# Patient Record
Sex: FEMALE | Race: WHITE
[De-identification: names, ages, dates, MRNs, and addresses within clinical notes are randomized per-mention and may not be internally consistent; named-entity substitution may affect disease eponyms.]

---

## 2017-02-08 ENCOUNTER — HOSPITAL ENCOUNTER (OUTPATIENT)
Dept: HOSPITAL 47 - BARWHC3 | Age: 47
Discharge: HOME | End: 2017-02-08
Payer: COMMERCIAL

## 2017-02-08 VITALS — BODY MASS INDEX: 35.7 KG/M2

## 2017-02-08 VITALS — HEART RATE: 74 BPM | SYSTOLIC BLOOD PRESSURE: 146 MMHG | DIASTOLIC BLOOD PRESSURE: 77 MMHG | TEMPERATURE: 99 F

## 2017-02-08 DIAGNOSIS — E66.01: ICD-10-CM

## 2017-02-08 DIAGNOSIS — Z79.899: ICD-10-CM

## 2017-02-08 DIAGNOSIS — Z98.84: ICD-10-CM

## 2017-02-08 DIAGNOSIS — R13.10: ICD-10-CM

## 2017-02-08 DIAGNOSIS — Z48.815: Primary | ICD-10-CM

## 2017-02-08 DIAGNOSIS — K50.90: ICD-10-CM

## 2017-02-08 DIAGNOSIS — Z98.890: ICD-10-CM

## 2017-02-08 DIAGNOSIS — E03.9: ICD-10-CM

## 2017-02-08 DIAGNOSIS — E44.0: ICD-10-CM

## 2017-02-08 DIAGNOSIS — Z71.3: ICD-10-CM

## 2017-02-08 DIAGNOSIS — N19: ICD-10-CM

## 2017-02-08 DIAGNOSIS — E89.1: ICD-10-CM

## 2017-02-08 DIAGNOSIS — T75.89XA: ICD-10-CM

## 2017-02-08 DIAGNOSIS — R79.89: ICD-10-CM

## 2017-02-08 DIAGNOSIS — E55.9: ICD-10-CM

## 2017-02-08 DIAGNOSIS — K74.1: ICD-10-CM

## 2017-02-08 DIAGNOSIS — L40.50: ICD-10-CM

## 2017-02-08 DIAGNOSIS — Z88.0: ICD-10-CM

## 2017-02-08 DIAGNOSIS — D50.8: ICD-10-CM

## 2017-02-08 DIAGNOSIS — M19.90: ICD-10-CM

## 2017-02-08 DIAGNOSIS — E21.1: ICD-10-CM

## 2017-02-08 DIAGNOSIS — K76.0: ICD-10-CM
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
## 2017-02-08 DIAGNOSIS — F32.9: ICD-10-CM

## 2017-02-08 PROCEDURE — 99211 OFF/OP EST MAY X REQ PHY/QHP: CPT

## 2017-02-08 PROCEDURE — 97803 MED NUTRITION INDIV SUBSEQ: CPT

## 2017-02-10 NOTE — PN
DATE OF SERVICE: 02/08/2017



CHIEF COMPLAINT: Followup, gastric bypass. 



HISTORY OF PRESENT ILLNESS: Jess Khoury is a 46-year-old female who 

is status post Garrett-en-Y gastric bypass on 10/31/2016. She is now over 

3 months out. Her highest weight for her 5-foot 4-inch frame was 289 

pounds. Ideal body weight is 144 pounds. Today she comes in weighing 

208 pounds. Lifetime weight loss is already 81 pounds. Her weight loss 

since her last visit is another 22 pounds. Body mass 

index has been reduced from 49.7 to 35.8. Percent excess 

weight loss is 56%. Total BMI is already down by 13.9 

points. She has a new diagnosis of psoriatic arthritis, where she had 

moderate joint involvement including erythema of the skin. She has 

been on a month-long steroid taper and has amazingly not gained any 

moderate or significant weight. Her rheumatologist is Dr. Brooks. She 

does report some troubles with swallowing, particularly with moderate 

textured foods such as steak. She is overall very happy with her level 

of weight loss. She still has some troubles with

her new image. Separately she also reports pain from her previous 

pelvic reconstruction at [facility], where she requires 

additional surgery. She reports eating expected amount of protein of 

over 65 grams daily.  



PAST MEDICAL HISTORY: 

1. Morbid obesity. 

2. Hypothyroidism. 

3. Anxiety.

4. Depression. 

5. Diabetes type 2. 

6. Panniculitis. 

7. Intra-abdominal adhesions

8. Chronic diarrhea. 

9. Gastroesophageal reflux disease. 

10. Pelvic adhesions. 



PAST SURGICAL HISTORY:  

1. Partial thyroidectomy. 

2. Hysterectomy. 

3. Bladder suspension. 

4. Upper endoscopy. 

5. Colonoscopy. 

6. Pelvic reconstruction at [facility] in January 2016.  

7. Cardiac catheterization one week ago. 

8. Status post Garrett-en-Y gastric bypass 10/31/2016. 



MEDICATIONS: 

1. Prednisone. 

2. Wellbutrin. 

3. Prilosec. 

4. Nystatin powder. 

5. Singulair. 

6. Synthroid. 

7. Norco. 

8. Prozac. 



ALLERGIES: Initially PENICILLIN. 



SOCIAL HISTORY: Lifelong nontobacco user. 



FAMILY HISTORY: Pertinent for fatty liver disease including gallbladder

disorders, also has morbid obesity in her family. 



REVIEW OF ORGAN SYSTEMS: 

CONSTITUTIONAL: Highest weight of 289 pounds. Ideal body weight 144 

pounds for her 5-foot, 4-inch frame. Present weight of 208 pounds. 

Total weight loss of 81 pounds. Percent excess weight loss of 56%. 

Body mass index reduced from 49.7 to 35.7.  

ENDOCRINE: History of hypothyroidism. 

MUSCULOSKELETAL: Improvement of diffuse osteoarthritis of the joints, 

particularly in knees, hips and hands, after being treated for 

psoriatic arthritis with a prednisone taper.  

PSYCH: Still persistent history of depression. 

CARDIOVASCULAR: Improvement of hypertension, where she is now off 

medications related to her hypertension.  

GASTROINTESTINAL: Denies any gastroesophageal reflux disease, as it 

has now resolved. Only concerns include intermittent dysphagia, 

particularly with textured foods.  

RESPIRATORY: History of obstructive sleep apnea. 

ENDOCRINE: History of hypothyroidism including diabetes type 2. 

GENITOURINARY: History of pelvic reconstruction. Also history of 

bladder prolapse.  

HEENT: Denies troubles with vision or hearing. 

NEURO: Has headache and seizure disorders. 

HEMATOLOGIC: Denies any easy bruising. 



PHYSICAL EXAM: 

VITAL SIGNS: 99.0, 74, 16, 146/77, 5 feet 4 inches, 208 pounds. Body 

mass index 35.8.  

ABDOMEN: No palpable incisional hernias. All incisions completely 

granulated. Soft, nontender, nondistended. 

GENERAL: Well-developed female in no acute distress. 

NEURO: No focal or lateralizing signs. Cranial nerves II through XII 

grossly within normal limits.  

PSYCH: Appropriate affect. Alert and oriented to person, place, and 

time.  

HEENT: No scleral icterus. 

NECK: Supple without lymphadenopathy. 

CHEST: Nonlabored respirations. 

CARDIOVASCULAR: Regular rate. 

MUSCULOSKELETAL: No clubbing, cyanosis, or edema. 



ASSESSMENT: 

1. Morbid obesity due to excess calories. 

2. Body mass index reduced from 49.7 to 35.8. 

3. Status post Garrett-en-Y gastric bypass. 

4. Dietary surveillance and counseling. 

5. Psoriatic arthritis. 

6. Dysphagia to solid foods. 

7. Active steroid exposure. 

8. Osteoarthritis, improved.

9. Hypothyroidism. 

10. Depression. 

11. Elevated liver enzymes. 

12. Fatty liver disease. 

13. Vitamin D deficiency. 

14. History of a bladder suspension. 

15. Diabetes type 2, non-insulin-dependent, now resolved. 

16. Obstructive sleep apnea, now resolved.  

17. Gastroesophageal reflux disease, now resolved. 

18. Hypertensive heart disease, now resolved. 



PLAN: 

1. She has been doing quite well as of recent after a fairly 

gracy postoperative course with abdominal pain from skin infection, 

which has completely resolved.  

2. She will be closely monitored, as she is on steroid therapy, and 

would recommend continued Prilosec use.  

3. She is to undergo additional surgical intervention for the pelvis, 

whereby she is cleared from a bariatric standpoint to proceed.  

4. I have recommended a bariatric metabolic panel to identify any 

nutritional deficiencies which will interfere with her wound healing.  

5. With her dysphagia to solid textured foods, she may also benefit 

from upper endoscopy with balloon dilatation.  

6. Recommend followup at [facility].

PUNEET

## 2017-02-12 NOTE — P.PN
Progress Note - Text











DATE OF SERVICE: 02/08/2017





CHIEF COMPLAINT: Followup, gastric bypass. 





HISTORY OF PRESENT ILLNESS: Jess Khoury is a 46-year-old female who 


is status post Garrett-en-Y gastric bypass on 10/31/2016. She is now over 


3 months out. Her highest weight for her 5-foot 4-inch frame was 289 


pounds. Ideal body weight is 144 pounds. Today she comes in weighing 


208 pounds. Lifetime weight loss is already 81 pounds. Her weight loss 


since her last visit is another 22 pounds. Body mass 


index has been reduced from 49.7 to 35.8. Percent excess 


weight loss is 56%. Total BMI is already down by 13.9 


points. She has a new diagnosis of psoriatic arthritis, where she had 


moderate joint involvement including erythema of the skin. She has 


been on a month-long steroid taper and has amazingly not gained any 


moderate or significant weight. Her rheumatologist is Dr. Brooks. She 


does report some troubles with swallowing, particularly with moderate 


textured foods such as steak. She is overall very happy with her level 


of weight loss. She still has some troubles with


her new image. Separately she also reports pain from her previous 


pelvic reconstruction at [facility], where she requires 


additional surgery. She reports eating expected amount of protein of 


over 65 grams daily.  





PAST MEDICAL HISTORY: 


1. Morbid obesity. 


2. Hypothyroidism. 


3. Anxiety.


4. Depression. 


5. Diabetes type 2. 


6. Panniculitis. 


7. Intra-abdominal adhesions


8. Chronic diarrhea. 


9. Gastroesophageal reflux disease. 


10. Pelvic adhesions. 





PAST SURGICAL HISTORY:  


1. Partial thyroidectomy. 


2. Hysterectomy. 


3. Bladder suspension. 


4. Upper endoscopy. 


5. Colonoscopy. 


6. Pelvic reconstruction at [facility] in January 2016.  


7. Cardiac catheterization one week ago. 


8. Status post Garrett-en-Y gastric bypass 10/31/2016. 





MEDICATIONS: 


1. Prednisone. 


2. Wellbutrin. 


3. Prilosec. 


4. Nystatin powder. 


5. Singulair. 


6. Synthroid. 


7. Norco. 


8. Prozac. 





ALLERGIES: Initially PENICILLIN. 





SOCIAL HISTORY: Lifelong nontobacco user. 





FAMILY HISTORY: Pertinent for fatty liver disease including gallbladder


disorders, also has morbid obesity in her family. 





REVIEW OF ORGAN SYSTEMS: 


CONSTITUTIONAL: Highest weight of 289 pounds. Ideal body weight 144 


pounds for her 5-foot, 4-inch frame. Present weight of 208 pounds. 


Total weight loss of 81 pounds. Percent excess weight loss of 56%. 


Body mass index reduced from 49.7 to 35.7.  


ENDOCRINE: History of hypothyroidism. 


MUSCULOSKELETAL: Improvement of diffuse osteoarthritis of the joints, 


particularly in knees, hips and hands, after being treated for 


psoriatic arthritis with a prednisone taper.  


PSYCH: Still persistent history of depression. 


CARDIOVASCULAR: Improvement of hypertension, where she is now off 


medications related to her hypertension.  


GASTROINTESTINAL: Denies any gastroesophageal reflux disease, as it 


has now resolved. Only concerns include intermittent dysphagia, 


particularly with textured foods.  


RESPIRATORY: History of obstructive sleep apnea. 


ENDOCRINE: History of hypothyroidism including diabetes type 2. 


GENITOURINARY: History of pelvic reconstruction. Also history of 


bladder prolapse.  


HEENT: Denies troubles with vision or hearing. 


NEURO: Has headache and seizure disorders. 


HEMATOLOGIC: Denies any easy bruising. 





PHYSICAL EXAM: 


VITAL SIGNS: 99.0, 74, 16, 146/77, 5 feet 4 inches, 208 pounds. Body 


mass index 35.8.  


ABDOMEN: No palpable incisional hernias. All incisions completely 


granulated. Soft, nontender, nondistended. 


GENERAL: Well-developed female in no acute distress. 


NEURO: No focal or lateralizing signs. Cranial nerves II through XII 


grossly within normal limits.  


PSYCH: Appropriate affect. Alert and oriented to person, place, and 


time.  


HEENT: No scleral icterus. 


NECK: Supple without lymphadenopathy. 


CHEST: Nonlabored respirations. 


CARDIOVASCULAR: Regular rate. 


MUSCULOSKELETAL: No clubbing, cyanosis, or edema. 





ASSESSMENT: 


1. Morbid obesity due to excess calories. 


2. Body mass index reduced from 49.7 to 35.8. 


3. Status post Garrett-en-Y gastric bypass. 


4. Dietary surveillance and counseling. 


5. Psoriatic arthritis. 


6. Dysphagia to solid foods. 


7. Active steroid exposure. 


8. Osteoarthritis, improved.


9. Hypothyroidism. 


10. Depression. 


11. Elevated liver enzymes. 


12. Fatty liver disease. 


13. Vitamin D deficiency. 


14. History of a bladder suspension. 


15. Diabetes type 2, non-insulin-dependent, now resolved. 


16. Obstructive sleep apnea, now resolved.  


17. Gastroesophageal reflux disease, now resolved. 


18. Hypertensive heart disease, now resolved. 





PLAN: 


1. She has been doing quite well as of recent after a fairly 


gracy postoperative course with abdominal pain from skin infection, 


which has completely resolved.  


2. She will be closely monitored, as she is on steroid therapy, and 


would recommend continued Prilosec use.  


3. She is to undergo additional surgical intervention for the pelvis, 


whereby she is cleared from a bariatric standpoint to proceed.  


4. I have recommended a bariatric metabolic panel to identify any 


nutritional deficiencies which will interfere with her wound healing.  


5. With her dysphagia to solid textured foods, she may also benefit 


from upper endoscopy with balloon dilatation.  


6. Recommend followup at [facility].

## 2017-04-12 ENCOUNTER — HOSPITAL ENCOUNTER (OUTPATIENT)
Dept: HOSPITAL 47 - BARWHC3 | Age: 47
Discharge: HOME | End: 2017-04-12
Payer: COMMERCIAL

## 2017-04-12 VITALS — BODY MASS INDEX: 33.7 KG/M2

## 2017-04-12 VITALS — DIASTOLIC BLOOD PRESSURE: 95 MMHG | TEMPERATURE: 98.5 F | SYSTOLIC BLOOD PRESSURE: 162 MMHG | HEART RATE: 88 BPM

## 2017-04-12 DIAGNOSIS — M06.9: ICD-10-CM

## 2017-04-12 DIAGNOSIS — I11.9: ICD-10-CM

## 2017-04-12 DIAGNOSIS — M79.3: ICD-10-CM

## 2017-04-12 DIAGNOSIS — Z48.815: Primary | ICD-10-CM

## 2017-04-12 DIAGNOSIS — M17.0: ICD-10-CM

## 2017-04-12 DIAGNOSIS — Z98.84: ICD-10-CM

## 2017-04-12 DIAGNOSIS — M47.896: ICD-10-CM

## 2017-04-12 DIAGNOSIS — Z71.3: ICD-10-CM

## 2017-04-12 DIAGNOSIS — E66.9: ICD-10-CM

## 2017-04-12 DIAGNOSIS — M16.0: ICD-10-CM

## 2017-04-12 DIAGNOSIS — Z79.899: ICD-10-CM

## 2017-04-12 PROCEDURE — 97803 MED NUTRITION INDIV SUBSEQ: CPT

## 2017-04-12 PROCEDURE — 99211 OFF/OP EST MAY X REQ PHY/QHP: CPT

## 2017-04-23 NOTE — PN
DATE OF SERVICE:  04/12/2017. 



CHIEF COMPLAINT: Follow-up gastric bypass.



HISTORY OF PRESENT ILLNESS:   Jess Khoury is a 46-year-old female 

who is status post Garrett-en-Y gastric bypass on 10/31/2016. She is now 

6 months out. For her height of 5 foot 4 inches, her ideal body weight 

is 144 pounds. Today she comes in weighing 196 pounds. Her highest 

weight was 289 pounds. She has already lost 93 pounds. Body mass index 

was reduced from 49.7 down to 33.8. BMI point reduction is 16 points. 

She has already achieved 64% weight loss in 6 months. Since her last 

visit approximately a little over  4+ months ago she has lost 

another 12 pounds. She comes in primarily with complaints of decreased 

appetite. She is now under 200 pounds. She was diagnosed with 

rheumatoid arthritis, which she sees a rheumatologist. Now she 

presents for further evaluation and management. She also reports 

moderate personal stressors in her life which is also affecting her 

blood pressure. She is off CPAP machine as well.  



PAST MEDICAL HISTORY: 

1. Morbid obesity. 

2. Hypothyroidism. 

3. Anxiety.

4. Depression. 

5. Diabetes type 2. 

6. Panniculitis. 

7. Intra-abdominal adhesions

8. Chronic diarrhea. 

9. Gastroesophageal reflux disease. 

10. Rheumatoid Arthritis.



PAST SURGICAL HISTORY:  

1. Partial thyroidectomy. 

2. Hysterectomy. 

3. Bladder suspension. 

4. Upper endoscopy. 

5. Colonoscopy. 

6. Pelvic reconstruction at [facility] in January 2016.  

7. Cardiac catheterization one week ago. 

8. Status post Garrett-en-Y gastric bypass. 



MEDICATIONS:

1. Hyzaar. 

2. Prednisone. 

3. Wellbutrin. 

4. Prilosec. 

5. Nystatin powder. 

6. Singulair. 

7. Synthroid. 

8. Prozac. 



ALLERGIES: Resolved PENICILLIN. 



SOCIAL HISTORY: Lifelong nontobacco user. 



FAMILY HISTORY: Pertinent for fatty liver disease including gallbladder

disorders, also has morbid obesity in her family. 



REVIEW OF SYSTEMS:

CONSTITUTIONAL: Highest weight is 289 pounds. Ideal body weight of 144 

pounds. She has lost 93 pound in the last 6 months. Percent excess 

weight loss is 64%. Body mass index is reduced from 49.7 down to 33.8. 

Total BMI point reduction is 16 points. She is only 52 pounds 

overweight.  

RESPIRATORY:  She is to discontinue her CPAP machine.  Sleep apnea has 

now resolved.  

CARDIOVASCULAR: She is on blood pressure medication secondary to minor 

stress in her life.  

MUSCULOSKELETAL: Moderate reduction of bilateral hip and knee and 

lower back pain. Rheumatologic: Diagnosed with rheumatoid arthritis. 

Currently on steroids as a result.  

GASTROINTESTINAL: No reports of gastroesophageal reflux which is 

resolved. Denies dumping syndrome otherwise.  

ENDOCRINE: History of hypothyroidism including diabetes type 2 now resoled.

GENITOURINARY: History of pelvic reconstruction. Also history of 

bladder prolapse.  

HEENT: Denies troubles with vision or hearing. 

NEURO: Has headache and seizure disorders. 

PSYCH: Has anxiety including depression. 

HEMATOLOGIC: Denies any easy bruising. 



PHYSICAL EXAM:

VITAL SIGNS: 98.5, 88, 162/95, 5 foot 4, 196 pounds. Body mass index 

of 33.8.  

ABDOMEN: Soft, nontender, nondistended. No palpable incisional 

hernias. Pannus extends over pubis by at least 5 cm.  Mild hyperemia 

consistent with panniculitis.  

GENERAL: Well-developed female in no acute distress. 

NEURO: No focal or lateralizing signs. Cranial nerves II through XII 

grossly within normal limits.  

PSYCH: Appropriate affect. Alert and oriented to person, place, and 

time.  

HEENT: No scleral icterus. Extraocular movements grossly intact. No nasal 
drainage.

NECK: Supple without lymphadenopathy. 

CHEST: Nonlabored respirations. 

CARDIOVASCULAR: Regular rate. Regular rhythm.

MUSCULOSKELETAL: No clubbing, cyanosis, or edema. 



ASSESSMENT:

1. Morbid obesity due to excess calories, now resolved. 

2. Body mass index reduced from 49.7 down to 33.8. 

3. Osteoarthritis. 

4. Hypothyroidism. 

5. Depression. 

6. Elevated liver enzymes, improved.

7. Fatty liver disease. 

8. Vitamin D deficiency. 

9. Obesity with body mass index is 33.8. 

10. History of intra-abdominal adhesions. 

11. Left ovarian cyst. 

12. History of a bladder suspension. 

13. Diabetes type 2, non-insulin-dependent. 

14. Obstructive sleep apnea. 

15. Gastroesophageal reflux disease, resolved.

17. History of atypical chest pain. 

18. Hypertensive heart disease. 

19. Osteoarthritis, improved.. 

20. Status post Garrett-en-Y gastric bypass. 

21. Social stressors aggravating hypertension. 

22. Osteoarthritis lower back, improved. 

23. Osteoarthritis of bilateral hips, improved. 

24. Osteoarthritis bilateral knees, improved. 

25. Panniculitis.



PLAN:

1. Recommend bariatric metabolic panel as she is long overdue. She 

reports having her blood work done at her local facility. However, 

blood work is still pending at this time.  

2. She is also pending additional procedures for pelvic surgery which 

she has been cleared.  

3. On exam, she has panniculitis for which nystatin powder is 

advised.   

PUNEET

## 2017-05-09 NOTE — P.PN
Progress Note - Text











DATE OF SERVICE:  04/12/2017. 





CHIEF COMPLAINT: Follow-up gastric bypass.





HISTORY OF PRESENT ILLNESS:   Jess Khoury is a 46-year-old female 


who is status post Garrett-en-Y gastric bypass on 10/31/2016. She is now 


6 months out. For her height of 5 foot 4 inches, her ideal body weight 


is 144 pounds. Today she comes in weighing 196 pounds. Her highest 


weight was 289 pounds. She has already lost 93 pounds. Body mass index 


was reduced from 49.7 down to 33.8. BMI point reduction is 16 points. 


She has already achieved 64% weight loss in 6 months. Since her last 


visit approximately a little over  4+ months ago she has lost 


another 12 pounds. She comes in primarily with complaints of decreased 


appetite. She is now under 200 pounds. She was diagnosed with 


rheumatoid arthritis, which she sees a rheumatologist. Now she 


presents for further evaluation and management. She also reports 


moderate personal stressors in her life which is also affecting her 


blood pressure. She is off CPAP machine as well.  





PAST MEDICAL HISTORY: 


1. Morbid obesity. 


2. Hypothyroidism. 


3. Anxiety.


4. Depression. 


5. Diabetes type 2. 


6. Panniculitis. 


7. Intra-abdominal adhesions


8. Chronic diarrhea. 


9. Gastroesophageal reflux disease. 


10. Rheumatoid Arthritis.





PAST SURGICAL HISTORY:  


1. Partial thyroidectomy. 


2. Hysterectomy. 


3. Bladder suspension. 


4. Upper endoscopy. 


5. Colonoscopy. 


6. Pelvic reconstruction at [facility] in January 2016.  


7. Cardiac catheterization one week ago. 


8. Status post Garrett-en-Y gastric bypass. 





MEDICATIONS:


1. Hyzaar. 


2. Prednisone. 


3. Wellbutrin. 


4. Prilosec. 


5. Nystatin powder. 


6. Singulair. 


7. Synthroid. 


8. Prozac. 





ALLERGIES: Resolved PENICILLIN. 





SOCIAL HISTORY: Lifelong nontobacco user. 





FAMILY HISTORY: Pertinent for fatty liver disease including gallbladder


disorders, also has morbid obesity in her family. 





REVIEW OF SYSTEMS:


CONSTITUTIONAL: Highest weight is 289 pounds. Ideal body weight of 144 


pounds. She has lost 93 pound in the last 6 months. Percent excess 


weight loss is 64%. Body mass index is reduced from 49.7 down to 33.8. 


Total BMI point reduction is 16 points. She is only 52 pounds 


overweight.  


RESPIRATORY:  She is to discontinue her CPAP machine.  Sleep apnea has 


now resolved.  


CARDIOVASCULAR: She is on blood pressure medication secondary to minor 


stress in her life.  


MUSCULOSKELETAL: Moderate reduction of bilateral hip and knee and 


lower back pain. Rheumatologic: Diagnosed with rheumatoid arthritis. 


Currently on steroids as a result.  


GASTROINTESTINAL: No reports of gastroesophageal reflux which is 


resolved. Denies dumping syndrome otherwise.  


ENDOCRINE: History of hypothyroidism including diabetes type 2 now resoled.


GENITOURINARY: History of pelvic reconstruction. Also history of 


bladder prolapse.  


HEENT: Denies troubles with vision or hearing. 


NEURO: Has headache and seizure disorders. 


PSYCH: Has anxiety including depression. 


HEMATOLOGIC: Denies any easy bruising. 





PHYSICAL EXAM:


VITAL SIGNS: 98.5, 88, 162/95, 5 foot 4, 196 pounds. Body mass index 


of 33.8.  


ABDOMEN: Soft, nontender, nondistended. No palpable incisional 


hernias. Pannus extends over pubis by at least 5 cm.  Mild hyperemia 


consistent with panniculitis.  


GENERAL: Well-developed female in no acute distress. 


NEURO: No focal or lateralizing signs. Cranial nerves II through XII 


grossly within normal limits.  


PSYCH: Appropriate affect. Alert and oriented to person, place, and 


time.  


HEENT: No scleral icterus. Extraocular movements grossly intact. No nasal 

drainage.


NECK: Supple without lymphadenopathy. 


CHEST: Nonlabored respirations. 


CARDIOVASCULAR: Regular rate. Regular rhythm.


MUSCULOSKELETAL: No clubbing, cyanosis, or edema. 





ASSESSMENT:


1. Morbid obesity due to excess calories, now resolved. 


2. Body mass index reduced from 49.7 down to 33.8. 


3. Osteoarthritis. 


4. Hypothyroidism. 


5. Depression. 


6. Elevated liver enzymes, improved.


7. Fatty liver disease. 


8. Vitamin D deficiency. 


9. Obesity with body mass index is 33.8. 


10. History of intra-abdominal adhesions. 


11. Left ovarian cyst. 


12. History of a bladder suspension. 


13. Diabetes type 2, non-insulin-dependent. 


14. Obstructive sleep apnea. 


15. Gastroesophageal reflux disease, resolved.


17. History of atypical chest pain. 


18. Hypertensive heart disease. 


19. Osteoarthritis, improved.. 


20. Status post Garrett-en-Y gastric bypass. 


21. Social stressors aggravating hypertension. 


22. Osteoarthritis lower back, improved. 


23. Osteoarthritis of bilateral hips, improved. 


24. Osteoarthritis bilateral knees, improved. 


25. Panniculitis.





PLAN:


1. Recommend bariatric metabolic panel as she is long overdue. She 


reports having her blood work done at her local facility. However, 


blood work is still pending at this time.  


2. She is also pending additional procedures for pelvic surgery which 


she has been cleared.  


3. On exam, she has panniculitis for which nystatin powder is 


advised.

## 2017-08-02 ENCOUNTER — HOSPITAL ENCOUNTER (OUTPATIENT)
Dept: HOSPITAL 47 - BARWHC3 | Age: 47
Discharge: HOME | End: 2017-08-02
Payer: COMMERCIAL

## 2017-08-02 DIAGNOSIS — Z53.9: Primary | ICD-10-CM

## 2017-08-23 ENCOUNTER — HOSPITAL ENCOUNTER (OUTPATIENT)
Dept: HOSPITAL 47 - BARWHC3 | Age: 47
Discharge: HOME | End: 2017-08-23
Payer: COMMERCIAL

## 2017-08-23 VITALS — TEMPERATURE: 98 F | DIASTOLIC BLOOD PRESSURE: 69 MMHG | SYSTOLIC BLOOD PRESSURE: 143 MMHG | HEART RATE: 71 BPM

## 2017-08-23 VITALS — BODY MASS INDEX: 31.9 KG/M2

## 2017-08-23 DIAGNOSIS — F41.9: ICD-10-CM

## 2017-08-23 DIAGNOSIS — Z87.19: ICD-10-CM

## 2017-08-23 DIAGNOSIS — Z88.0: ICD-10-CM

## 2017-08-23 DIAGNOSIS — E66.01: ICD-10-CM

## 2017-08-23 DIAGNOSIS — F32.9: ICD-10-CM

## 2017-08-23 DIAGNOSIS — Z79.899: ICD-10-CM

## 2017-08-23 DIAGNOSIS — Z98.84: ICD-10-CM

## 2017-08-23 DIAGNOSIS — M79.3: ICD-10-CM

## 2017-08-23 DIAGNOSIS — K76.0: ICD-10-CM

## 2017-08-23 DIAGNOSIS — E03.9: ICD-10-CM

## 2017-08-23 DIAGNOSIS — Z09: Primary | ICD-10-CM

## 2017-08-23 LAB
ALP SERPL-CCNC: 58 U/L (ref 38–126)
ALT SERPL-CCNC: 44 U/L (ref 9–52)
ANION GAP SERPL CALC-SCNC: 10 MMOL/L
APTT BLD: 25 SEC (ref 22–30)
AST SERPL-CCNC: 40 U/L (ref 14–36)
BUN SERPL-SCNC: 15 MG/DL (ref 7–17)
CALCIUM SPEC-MCNC: 9.8 MG/DL (ref 8.4–10.2)
CH: 30.3
CHCM: 33.4
CHLORIDE SERPL-SCNC: 102 MMOL/L (ref 98–107)
CHOLEST SERPL-MCNC: 165 MG/DL (ref ?–200)
CO2 SERPL-SCNC: 28 MMOL/L (ref 22–30)
ERYTHROCYTE [DISTWIDTH] IN BLOOD BY AUTOMATED COUNT: 4.14 M/UL (ref 3.8–5.4)
ERYTHROCYTE [DISTWIDTH] IN BLOOD: 14.8 % (ref 11.5–15.5)
GLUCOSE SERPL-MCNC: 79 MG/DL (ref 74–99)
HCT VFR BLD AUTO: 37.8 % (ref 34–46)
HDLC SERPL-MCNC: 49 MG/DL (ref 40–60)
HDW: 2.35
HEMOGLOBIN A1C: 5.5 % (ref 4.2–6.1)
HGB BLD-MCNC: 12.4 GM/DL (ref 11.4–16)
INR PPP: 1 (ref ?–1.2)
IRON SERPL-MCNC: 57 UG/DL (ref 37–170)
MAGNESIUM SPEC-SCNC: 2.1 MG/DL (ref 1.6–2.3)
MCH RBC QN AUTO: 29.9 PG (ref 25–35)
MCHC RBC AUTO-ENTMCNC: 32.8 G/DL (ref 31–37)
MCV RBC AUTO: 91.2 FL (ref 80–100)
NON-AFRICAN AMERICAN GFR(MDRD): >60
POTASSIUM SERPL-SCNC: 4.4 MMOL/L (ref 3.5–5.1)
PROT SERPL-MCNC: 6.9 G/DL (ref 6.3–8.2)
PT BLD: 10.5 SEC (ref 9–12)
SODIUM SERPL-SCNC: 140 MMOL/L (ref 137–145)
TIBC SERPL-MCNC: 389 UG/DL (ref 265–497)
VIT B12 SERPL-MCNC: 792 PG/ML (ref 239–931)
WBC # BLD AUTO: 7.3 K/UL (ref 3.8–10.6)

## 2017-08-23 PROCEDURE — 84425 ASSAY OF VITAMIN B-1: CPT

## 2017-08-23 PROCEDURE — 99201: CPT

## 2017-08-23 PROCEDURE — 83540 ASSAY OF IRON: CPT

## 2017-08-23 PROCEDURE — 85027 COMPLETE CBC AUTOMATED: CPT

## 2017-08-23 PROCEDURE — 83735 ASSAY OF MAGNESIUM: CPT

## 2017-08-23 PROCEDURE — 82525 ASSAY OF COPPER: CPT

## 2017-08-23 PROCEDURE — 82746 ASSAY OF FOLIC ACID SERUM: CPT

## 2017-08-23 PROCEDURE — 80053 COMPREHEN METABOLIC PANEL: CPT

## 2017-08-23 PROCEDURE — 84134 ASSAY OF PREALBUMIN: CPT

## 2017-08-23 PROCEDURE — 82607 VITAMIN B-12: CPT

## 2017-08-23 PROCEDURE — 82306 VITAMIN D 25 HYDROXY: CPT

## 2017-08-23 PROCEDURE — 84255 ASSAY OF SELENIUM: CPT

## 2017-08-23 PROCEDURE — 83036 HEMOGLOBIN GLYCOSYLATED A1C: CPT

## 2017-08-23 PROCEDURE — 84100 ASSAY OF PHOSPHORUS: CPT

## 2017-08-23 PROCEDURE — 85610 PROTHROMBIN TIME: CPT

## 2017-08-23 PROCEDURE — 85730 THROMBOPLASTIN TIME PARTIAL: CPT

## 2017-08-23 PROCEDURE — 83970 ASSAY OF PARATHORMONE: CPT

## 2017-08-23 PROCEDURE — 82728 ASSAY OF FERRITIN: CPT

## 2017-08-23 PROCEDURE — 36415 COLL VENOUS BLD VENIPUNCTURE: CPT

## 2017-08-23 PROCEDURE — 84630 ASSAY OF ZINC: CPT

## 2017-08-23 PROCEDURE — 83550 IRON BINDING TEST: CPT

## 2017-08-23 PROCEDURE — 80061 LIPID PANEL: CPT

## 2017-08-23 PROCEDURE — 84590 ASSAY OF VITAMIN A: CPT

## 2017-08-23 PROCEDURE — 84443 ASSAY THYROID STIM HORMONE: CPT

## 2017-08-24 LAB — PREALB SERPL-MCNC: 17 MG/DL (ref 18–42)

## 2017-09-30 NOTE — P.PN
Progress Note - Text








DATE OF SERVICE:  08/23/2017. 





CHIEF COMPLAINT: Follow-up gastric bypass.





HISTORY OF PRESENT ILLNESS:   Jess Khoury is a 48-year-old female 


who is status post Garrett-en-Y gastric bypass on 10/31/2016. She is now 


9 months out. For her height of 5 foot 4 inches, her ideal body weight 


is 144 pounds. Today she comes in weighing 186 pounds. Her highest 


weight was 289 pounds. She has already lost 103 pounds. Body mass index 


was reduced from 49.7 down to 31.9. She has achieved 71% excess weight loss.


She has lost another 11 pounds in 4 months.  





She has had long-standing problems with mesh removal from a bladder suspension 

surgery.  


In fact, she is to undergo another procedure.  Her main concern today includes 

chronic 


panniculitis of her pannus.  She has been on prescribed powders and medicated 

creams 


without improvement in her symptoms for over 1 year.  She is evaluating for 

panniculectomy.  


No reports of nausea or vomiting.  No reports of reflux disease.  Her 

depression is stable.





PAST MEDICAL HISTORY: 


1. Morbid obesity. 


2. Hypothyroidism. 


3. Anxiety.


4. Depression. 


5. Diabetes type 2. 


6. Panniculitis. 


7. Intra-abdominal adhesions


8. Chronic diarrhea. 


9. Gastroesophageal reflux disease. 


10. Rheumatoid Arthritis.





PAST SURGICAL HISTORY:  


1. Partial thyroidectomy. 


2. Hysterectomy. 


3. Bladder suspension. 


4. Upper endoscopy. 


5. Colonoscopy. 


6. Pelvic reconstruction at [facility] in January 2016.  


7. Cardiac catheterization one week ago. 


8. Status post Garrett-en-Y gastric bypass. 





MEDICATIONS:


1. Hyzaar. 


2. Wellbutrin. 


3. Prilosec. 


4. Nystatin powder. 


5. Singulair. 


6. Synthroid. 


7. Prozac. 





ALLERGIES: Resolved PENICILLIN. 





SOCIAL HISTORY: Lifelong nontobacco user. 





FAMILY HISTORY: Pertinent for fatty liver disease including gallbladder


disorders, also has morbid obesity in her family. 





REVIEW OF SYSTEMS:


CONSTITUTIONAL: For her height of 5 foot 4 inches, her ideal body weight 


is 144 pounds. Today she comes in weighing 186 pounds. Her highest 


weight was 289 pounds. She has already lost 103 pounds. Body mass index 


was reduced from 49.7 down to 31.9. She has achieved 71% excess weight loss.


She has lost another 11 pounds in 4 months.  


RESPIRATORY:  Has asthma.  Sleep apnea has now resolved.  


CARDIOVASCULAR: She is on blood pressure medication secondary to minor 


stress in her life.  


MUSCULOSKELETAL: Moderate reduction of bilateral hip and knee and 


lower back pain. 


RHEUMATOLOGIC: Diagnosed with rheumatoid arthritis. 


GASTROINTESTINAL: No reports of gastroesophageal reflux which is 


resolved. Denies dumping syndrome otherwise.  


ENDOCRINE: History of hypothyroidism. Diabetes type 2 now resoled.


GENITOURINARY: History of pelvic reconstruction. Also history of 


bladder prolapse.  


HEENT: Denies troubles with vision or hearing. 


NEURO: Has headache and seizure disorders. 


PSYCH: Has anxiety including depression. 


HEMATOLOGIC: Denies any easy bruising. 





PHYSICAL EXAM:


VITAL SIGNS: 186 pounds. Body mass index of 31.9.


 Vital Signs











Temp  98 F   08/23/17 14:50


 


Pulse  71   08/23/17 14:50


 


Resp      


 


BP  143/69   08/23/17 14:50


 


Pulse Ox      








  


ABDOMEN: Soft, nontender, nondistended. No palpable incisional 


hernias. Pannus extends over pubis with hyperemia consistent with panniculitis.

  


Weight of pannus, over 5 pounds.


GENERAL: Well-developed female in no acute distress. 


NEURO: No focal or lateralizing signs. Cranial nerves II through XII 


grossly within normal limits.  


PSYCH: Appropriate affect. Alert and oriented to person, place, and 


time.  


HEENT: No scleral icterus. Extraocular movements grossly intact. No nasal 

drainage.


NECK: Supple without lymphadenopathy. 


CHEST: Nonlabored respirations. 


CARDIOVASCULAR: Regular rate. Regular rhythm.


MUSCULOSKELETAL: No clubbing, cyanosis, or edema. 


SKIN: Good skin turgor.  Well perfused.





ASSESSMENT:


1. Morbid obesity due to excess calories, now resolved. 


2. Body mass index reduced from 49.7 down to 31.9. 


4. Hypothyroidism. 


5. Depression. 


6. Elevated liver enzymes, improved.


7. Fatty liver disease. 


8. Vitamin D deficiency. 


9. History of intra-abdominal adhesions. 


10. Diabetes type 2, non-insulin-dependent, resolved. 


11. Obstructive sleep apnea, resolved.


12. Gastroesophageal reflux disease, resolved.


13. Hypertensive heart disease, resolved.


14. Status post Garrett-en-Y gastric bypass. 


15. Osteoarthritis lower back, improved. 


16. Osteoarthritis of bilateral hips, improved. 


17. Osteoarthritis bilateral knees, improved. 


18. Panniculitis.





PLAN:


1.  Recommend bariatric metabolic panel.


2.  She has chronic panniculitis with treatment over one year without 

improvement.  Recommend evaluation for panniculectomy.


3.  She is due to undergo additional pelvic surgery.  She will need clearance 

from her pelvic surgery including at least 30 days between procedures.


4.  She is less than 1 year out from her procedure and will reevaluate after 

her anniversary of 1 year, November 2017.


5.  Thyroid level suppressed, recommend adjusting her Synthroid.





 Laboratory Last Values











WBC  7.3 k/uL (3.8-10.6)   08/23/17  15:37    


 


RBC  4.14 m/uL (3.80-5.40)   08/23/17  15:37    


 


Hgb  12.4 gm/dL (11.4-16.0)   08/23/17  15:37    


 


Hct  37.8 % (34.0-46.0)   08/23/17  15:37    


 


MCV  91.2 fL (80.0-100.0)   08/23/17  15:37    


 


MCH  29.9 pg (25.0-35.0)   08/23/17  15:37    


 


MCHC  32.8 g/dL (31.0-37.0)   08/23/17  15:37    


 


RDW  14.8 % (11.5-15.5)   08/23/17  15:37    


 


Plt Count  252 k/uL (150-450)   08/23/17  15:37    


 


PT  10.5 sec (9.0-12.0)   08/23/17  15:37    


 


INR  1.0  (<1.2)   08/23/17  15:37    


 


APTT  25.0 sec (22.0-30.0)   08/23/17  15:37    


 


Sodium  140 mmol/L (137-145)   08/23/17  15:37    


 


Potassium  4.4 mmol/L (3.5-5.1)   08/23/17  15:37    


 


Chloride  102 mmol/L ()   08/23/17  15:37    


 


Carbon Dioxide  28 mmol/L (22-30)   08/23/17  15:37    


 


Anion Gap  10 mmol/L  08/23/17  15:37    


 


BUN  15 mg/dL (7-17)   08/23/17  15:37    


 


Creatinine  0.70 mg/dL (0.52-1.04)   08/23/17  15:37    


 


Est GFR (MDRD) Af Amer  >60  (>60 ml/min/1.73 sqM)   08/23/17  15:37    


 


Est GFR (MDRD) Non-Af  >60  (>60 ml/min/1.73 sqM)   08/23/17  15:37    


 


Glucose  79 mg/dL (74-99)   08/23/17  15:37    


 


Estimated Ave Glu mg/dL  111 mg/dL  08/23/17  15:37    


 


Hemoglobin A1c  5.5 % (4.2-6.1)   08/23/17  15:37    


 


Calcium  9.8 mg/dL (8.4-10.2)   08/23/17  15:37    


 


Phosphorus  4.5 mg/dL (2.5-4.5)   08/23/17  15:37    


 


Magnesium  2.1 mg/dL (1.6-2.3)   08/23/17  15:37    


 


Iron  57 ug/dL ()   08/23/17  15:37    


 


TIBC  389 ug/dL (265-497)   08/23/17  15:37    


 


% Saturation  14.7 % (20-50)  L  08/23/17  15:37    


 


Ferritin  54.9 ng/mL (10.0-291.0)   08/23/17  15:37    


 


Total Bilirubin  0.5 mg/dL (0.2-1.3)   08/23/17  15:37    


 


AST  40 U/L (14-36)  H  08/23/17  15:37    


 


ALT  44 U/L (9-52)   08/23/17  15:37    


 


Alkaline Phosphatase  58 U/L ()   08/23/17  15:37    


 


Total Protein  6.9 g/dL (6.3-8.2)   08/23/17  15:37    


 


Albumin  4.3 g/dL (3.5-5.0)   08/23/17  15:37    


 


Prealbumin  17.0 mg/dL (18.0-42.0)  L  08/23/17  15:37    


 


Triglycerides  89 mg/dL (<150)   08/23/17  15:37    


 


Cholesterol  165 mg/dL (<200)   08/23/17  15:37    


 


LDL Cholesterol, Calc  98 mg/dL (0-99)   08/23/17  15:37    


 


HDL Cholesterol  49 mg/dL (40-60)   08/23/17  15:37    


 


Vitamin A  38 ug/dL ()   08/23/17  15:37    


 


Vitamin B1  102 ug/L ()   08/23/17  15:37    


 


Vitamin B12  792 pg/mL (239-931)   08/23/17  15:37    


 


Vitamin D 25-Hydroxy  31.3 ng/mL (30.0-100.0)   08/23/17  15:37    


 


Folate  >20.00 ng/mL (>2.75)   08/23/17  15:37    


 


TSH  0.074 mIU/L (0.465-4.680)  L  08/23/17  15:37    


 


PTH Intact  54.9 pg/mL (14.0-72.0)   08/23/17  15:37    


 


Copper  1498 ug/L (810-1990)   08/23/17  15:37    


 


Selenium  153 mcg/L ()   08/23/17  15:37    


 


Zinc  60 ug/dL ()   08/23/17  15:37

## 2017-11-15 ENCOUNTER — HOSPITAL ENCOUNTER (OUTPATIENT)
Dept: HOSPITAL 47 - BARWHC3 | Age: 47
Discharge: HOME | End: 2017-11-15
Payer: COMMERCIAL

## 2017-11-15 VITALS
RESPIRATION RATE: 18 BRPM | SYSTOLIC BLOOD PRESSURE: 147 MMHG | HEART RATE: 70 BPM | DIASTOLIC BLOOD PRESSURE: 72 MMHG | TEMPERATURE: 98.2 F

## 2017-11-15 VITALS — BODY MASS INDEX: 30.2 KG/M2

## 2017-11-15 DIAGNOSIS — E11.9: ICD-10-CM

## 2017-11-15 DIAGNOSIS — M79.3: ICD-10-CM

## 2017-11-15 DIAGNOSIS — F41.9: ICD-10-CM

## 2017-11-15 DIAGNOSIS — E03.9: ICD-10-CM

## 2017-11-15 DIAGNOSIS — K76.0: ICD-10-CM

## 2017-11-15 DIAGNOSIS — M16.0: ICD-10-CM

## 2017-11-15 DIAGNOSIS — Z87.19: ICD-10-CM

## 2017-11-15 DIAGNOSIS — M17.0: ICD-10-CM

## 2017-11-15 DIAGNOSIS — Z72.0: ICD-10-CM

## 2017-11-15 DIAGNOSIS — Z90.710: ICD-10-CM

## 2017-11-15 DIAGNOSIS — Z98.84: ICD-10-CM

## 2017-11-15 DIAGNOSIS — E66.01: Primary | ICD-10-CM

## 2017-11-15 DIAGNOSIS — F32.9: ICD-10-CM

## 2017-11-15 DIAGNOSIS — E55.9: ICD-10-CM

## 2017-11-15 DIAGNOSIS — Z79.899: ICD-10-CM

## 2017-11-15 PROCEDURE — 99211 OFF/OP EST MAY X REQ PHY/QHP: CPT

## 2017-11-15 PROCEDURE — 97803 MED NUTRITION INDIV SUBSEQ: CPT

## 2018-01-10 ENCOUNTER — HOSPITAL ENCOUNTER (OUTPATIENT)
Dept: HOSPITAL 47 - BARWHC3 | Age: 48
Discharge: HOME | End: 2018-01-10
Payer: COMMERCIAL

## 2018-01-10 VITALS — TEMPERATURE: 98.2 F | SYSTOLIC BLOOD PRESSURE: 112 MMHG | DIASTOLIC BLOOD PRESSURE: 74 MMHG | HEART RATE: 80 BPM

## 2018-01-10 VITALS — BODY MASS INDEX: 29.9 KG/M2

## 2018-01-10 DIAGNOSIS — E03.9: ICD-10-CM

## 2018-01-10 DIAGNOSIS — M06.9: ICD-10-CM

## 2018-01-10 DIAGNOSIS — M16.0: ICD-10-CM

## 2018-01-10 DIAGNOSIS — K66.0: ICD-10-CM

## 2018-01-10 DIAGNOSIS — K52.9: ICD-10-CM

## 2018-01-10 DIAGNOSIS — R79.89: ICD-10-CM

## 2018-01-10 DIAGNOSIS — E55.9: ICD-10-CM

## 2018-01-10 DIAGNOSIS — F41.9: ICD-10-CM

## 2018-01-10 DIAGNOSIS — Z98.84: ICD-10-CM

## 2018-01-10 DIAGNOSIS — K76.0: ICD-10-CM

## 2018-01-10 DIAGNOSIS — F32.9: ICD-10-CM

## 2018-01-10 DIAGNOSIS — E66.01: ICD-10-CM

## 2018-01-10 DIAGNOSIS — M17.0: ICD-10-CM

## 2018-01-10 DIAGNOSIS — M79.3: ICD-10-CM

## 2018-01-10 DIAGNOSIS — Z72.0: ICD-10-CM

## 2018-01-10 DIAGNOSIS — Z79.899: ICD-10-CM

## 2018-01-10 DIAGNOSIS — M47.9: ICD-10-CM

## 2018-01-10 DIAGNOSIS — Z09: Primary | ICD-10-CM

## 2018-01-10 DIAGNOSIS — J45.909: ICD-10-CM

## 2018-01-10 PROCEDURE — 99211 OFF/OP EST MAY X REQ PHY/QHP: CPT

## 2018-01-13 NOTE — P.PN
Subjective


Progress Note Date: 11/15/17





DATE OF SERVICE:  11/15/2017. 





CHIEF COMPLAINT: Follow-up gastric bypass.





HISTORY OF PRESENT ILLNESS:   Jess Khoury is a 47-year-old female 


who is status post Garrett-en-Y gastric bypass on 10/31/2016. She is now 


1 year out. For her height of 5 foot 4 inches, her ideal body weight 


is 144 pounds. Today she comes in weighing 176 pounds. Her highest 


weight was 289 pounds. She has already lost 113 pounds. Body mass index 


was reduced from 49.7 down to 30.2. She has achieved 78% excess weight loss.


She has lost another 10 pounds in 3 months.  Separately, she has had multiple 


surgeries for mesh removal from her pelvis.  She is less than 20 pounds away 


from her personal goal.  She is undergoing physical therapy of her pelvis.


She has other concerns including chronic panniculitis for a year. 


She is evaluating for a panniculectomy.





PAST MEDICAL HISTORY: 


1. Morbid obesity. 


2. Hypothyroidism. 


3. Anxiety.


4. Depression. 


5. Diabetes type 2. 


6. Panniculitis. 


7. Intra-abdominal adhesions


8. Chronic diarrhea. 


9. Gastroesophageal reflux disease. 


10. Rheumatoid Arthritis.


11.  Asthma.


12.  Hypertensive heart disease.





PAST SURGICAL HISTORY:  


1. Partial thyroidectomy. 


2. Hysterectomy. 


3. Bladder suspension. 


4. Upper endoscopy. 


5. Colonoscopy. 


6. Pelvic reconstruction at [facility] in January 2016.  


7. Cardiac catheterization one week ago. 


8. Status post Garrett-en-Y gastric bypass. 





MEDICATIONS:


1.  Methotrexate.


2.  Folic acid.


3.  Humira.


4.  Prilosec.


5.  Singulair.


6.  Hyzaar.


7.  Synthroid.


8.  Prozac.


9.  Nystatin Powder.





ALLERGIES: Resolved PENICILLIN. 





SOCIAL HISTORY: Lifelong nontobacco user. 





FAMILY HISTORY: Pertinent for fatty liver disease including gallbladder


disorders, also has morbid obesity in her family. 





REVIEW OF SYSTEMS:


CONSTITUTIONAL: For her height of 5 foot 4 inches, her ideal body weight 


is 144 pounds. Today she comes in weighing 176 pounds. Her highest 


weight was 289 pounds. She has already lost 113 pounds. Body mass index 


was reduced from 49.7 down to 30.2. She has achieved 78% excess weight loss.


She has lost another 10 pounds in 3 months.  


RESPIRATORY:  Has asthma.  Sleep apnea has now resolved.  


CARDIOVASCULAR: She is on blood pressure medication secondary to minor 


stress in her life.  


MUSCULOSKELETAL: Moderate reduction of bilateral hip and knee and 


lower back pain. 


RHEUMATOLOGIC: Diagnosed with rheumatoid arthritis. 


GASTROINTESTINAL: No reports of gastroesophageal reflux which is 


resolved. Denies dumping syndrome otherwise.  


ENDOCRINE: History of hypothyroidism. Diabetes type 2 now resoled.


GENITOURINARY: History of pelvic reconstruction. Also history of 


bladder prolapse.  


HEENT: Denies troubles with vision or hearing. 


NEURO: Has headache and seizure disorders. 


PSYCH: Has anxiety including depression. 


HEMATOLOGIC: Denies any easy bruising. 





PHYSICAL EXAM:


VITAL SIGNS: 176 pounds. Body mass index of 30.2.


 Vital Signs











Temp  98.2 F   11/15/17 14:55


 


Pulse  70   11/15/17 14:55


 


Resp  18   11/15/17 14:55


 


BP  147/72   11/15/17 14:55


 


Pulse Ox      








  


ABDOMEN: Soft, nontender, nondistended. No palpable incisional 


hernias. Pannus extends over pubis with hyperemia consistent with panniculitis.

  


Weight of pannus, over 8 pounds.


GENERAL: Well-developed female in no acute distress. 


NEURO: No focal or lateralizing signs. Cranial nerves II through XII grossly 

within normal limits.  


PSYCH: Appropriate affect. Alert and oriented to person, place, and time.  


HEENT: No scleral icterus. Extraocular movements grossly intact. No nasal 

drainage.


NECK: Supple without lymphadenopathy. 


CHEST: Nonlabored respirations.  Equal bilateral excursions.


CARDIOVASCULAR: Regular rate. Regular rhythm.  2+ radial pulses.


MUSCULOSKELETAL: No clubbing, cyanosis, or edema. 


SKIN: Good skin turgor.  Well perfused.





ASSESSMENT:


1. Morbid obesity due to excess calories, now resolved. 


2. Body mass index reduced from 49.7 down to 30.2.. 


4. Hypothyroidism. 


5. Depression. 


6. Elevated liver enzymes, improved.


7. Fatty liver disease. 


8. Vitamin D deficiency. 


9. History of intra-abdominal adhesions. 


10. Diabetes type 2, non-insulin-dependent, resolved. 


11. Obstructive sleep apnea, resolved.


12. Gastroesophageal reflux disease, resolved.


13. Hypertensive heart disease, resolved.


14. Status post Garrett-en-Y gastric bypass. 


15. Osteoarthritis lower back, improved. 


16. Osteoarthritis of bilateral hips, improved. 


17. Osteoarthritis bilateral knees, improved. 


18. Panniculitis.





PLAN:


1.  She has chronic panniculitis for over one year.  Despite medical treatment, 

recommend panniculectomy.


2.  Benefits and risks panniculectomy including bleeding, infection, flap 

failure reviewed in detail.


3.  Recommend that she obtain goal weight loss prior to panniculectomy.


4.  Inpatient hospitalization overnight advised.


5.  DVT prophylaxis.


6.  Antibiotic prophylaxis.


7.  As she is 1 year out, recommend bariatric metabolic panel.











Objective





- Vital Signs


Vital signs: 


 Vital Signs











Temp  98.2 F   11/15/17 14:55


 


Pulse  70   11/15/17 14:55


 


Resp  18   11/15/17 14:55


 


BP  147/72   11/15/17 14:55


 


Pulse Ox      








 Intake & Output











 11/14/17 11/15/17 11/15/17





 18:59 06:59 18:59


 


Weight   79.923 kg

## 2018-03-03 NOTE — P.PN
Subjective


Progress Note Date: 01/10/18





DATE OF SERVICE:  1/10/2018. 





CHIEF COMPLAINT: Follow-up gastric bypass.





HISTORY OF PRESENT ILLNESS:   Jess Khoury is a 47-year-old female 


who is status post Garrett-en-Y gastric bypass on 10/31/2016. She is now 


1.5 years out. For her height of 5 foot 4 inches, her ideal body weight 


is 144 pounds. Today she comes in weighing 174 pounds. Her highest 


weight was 289 pounds. She has already lost 115 pounds. Body mass index 


was reduced from 49.7 down to 30.0. She has achieved 79% excess weight loss.


She has lost another 2 pounds in 3 months.  


She reports chronic panniculitis despite treatment over 1-1/2 years.  


Separately, she has been treated for rheumatoid arthritis.  


She is currently on a Humera once every 2 weeks including 


methotrexate.  She is now evaluating for panniculectomy.


No reports of gastroesophageal reflux disease.  No reports of hypertension.





PAST MEDICAL HISTORY: 


1. Morbid obesity. 


2. Hypothyroidism. 


3. Anxiety.


4. Depression. 


5. Diabetes type 2. 


6. Panniculitis. 


7. Intra-abdominal adhesions


8. Chronic diarrhea. 


9. Gastroesophageal reflux disease. 


10. Rheumatoid Arthritis.


11.  Asthma.


12.  Hypertensive heart disease.





PAST SURGICAL HISTORY:  


1. Partial thyroidectomy. 


2. Hysterectomy. 


3. Bladder suspension. 


4. Upper endoscopy. 


5. Colonoscopy. 


6. Pelvic reconstruction at [facility] in January 2016.  


7. Cardiac catheterization one week ago. 


8. Status post Garrett-en-Y gastric bypass. 





MEDICATIONS:


1.  Methotrexate.


2.  Folic acid.


3.  Humira.


4.  Prilosec.


5.  Singulair.


6.  Hyzaar.


7.  Synthroid.


8.  Prozac.


9.  Nystatin Powder.





ALLERGIES: Resolved PENICILLIN. 





SOCIAL HISTORY: Lifelong nontobacco user. 





FAMILY HISTORY: Pertinent for fatty liver disease including gallbladder


disorders, also has morbid obesity in her family. 





REVIEW OF SYSTEMS:


CONSTITUTIONAL: For her height of 5 foot 4 inches, her ideal body weight 


is 144 pounds. Today she comes in weighing 174 pounds. Her highest 


weight was 289 pounds. She has already lost 115 pounds. Body mass index 


was reduced from 49.7 down to 30.0. She has achieved 79% excess weight loss.


She has lost another pounds in 3 months.  


RESPIRATORY:  Has asthma.  Sleep apnea has resolved.  


CARDIOVASCULAR: She is on blood pressure medication secondary to minor 


stress in her life.  


MUSCULOSKELETAL: Moderate reduction of bilateral hip and knee and 


lower back pain. 


RHEUMATOLOGIC: Diagnosed with rheumatoid arthritis. 


GASTROINTESTINAL: No reports of gastroesophageal reflux which is 


resolved. Denies dumping syndrome otherwise.  


ENDOCRINE: History of hypothyroidism. Diabetes type 2 now resoled.


GENITOURINARY: History of pelvic reconstruction. Also history of 


bladder prolapse.  


HEENT: Denies troubles with vision or hearing. 


NEURO: Has headache and seizure disorders. 


PSYCH: Has anxiety including depression. 


HEMATOLOGIC: Denies any easy bruising. 





PHYSICAL EXAM:


VITAL SIGNS: 174 pounds. Body mass index of 30.


 Vital Signs











Temp  98.2 F   01/10/18 16:16


 


Pulse  80   01/10/18 16:16


 


Resp      


 


BP  112/74   01/10/18 16:16


 


Pulse Ox      











  


ABDOMEN: Soft, nontender, nondistended. No palpable incisional 


hernias. Pannus extends over pubis with hyperemia consistent with panniculitis.

  


Weight of pannus, over 9 to 10 pounds.


GENERAL: Well-developed female in no acute distress. 


NEURO: No focal or lateralizing signs. Cranial nerves II through XII grossly 

within normal limits.  


PSYCH: Appropriate affect. Alert and oriented to person, place, and time.  


HEENT: No scleral icterus. Extraocular movements grossly intact. No nasal 

drainage.


NECK: Supple without lymphadenopathy. 


CHEST: Nonlabored respirations.  Equal bilateral excursions.


CARDIOVASCULAR: Regular rate. Regular rhythm.  2+ radial pulses.


MUSCULOSKELETAL: No clubbing, cyanosis, or edema. 


SKIN: Good skin turgor.  Well perfused.





ASSESSMENT:


1. Morbid obesity due to excess calories, now resolved. 


2. Body mass index reduced from 49.7 down to 30.


4. Hypothyroidism. 


5. Depression. 


6. Elevated liver enzymes, improved.


7. Fatty liver disease. 


8. Vitamin D deficiency. 


9. History of intra-abdominal adhesions. 


10. Diabetes type 2, non-insulin-dependent, resolved. 


11. Obstructive sleep apnea, resolved.


12. Gastroesophageal reflux disease, resolved.


13. Hypertensive heart disease, resolved.


14. Status post Garrett-en-Y gastric bypass. 


15. Osteoarthritis lower back, improved. 


16. Osteoarthritis of bilateral hips, improved. 


17. Osteoarthritis bilateral knees, improved. 


18. Panniculitis.





PLAN:


1.  She has chronic panniculitis despite medical treatment.


2.  Benefits and risks panniculectomy including bleeding, infection, flap 

failure reviewed in detail.


3.  Recommend panniculectomy.


4.  Inpatient hospitalization overnight advised.


5.  DVT prophylaxis.


6.  Antibiotic prophylaxis.











Objective





- Vital Signs


Vital signs: 


 Vital Signs











Temp  98.2 F   01/10/18 16:16


 


Pulse  80   01/10/18 16:16


 


Resp      


 


BP  112/74   01/10/18 16:16


 


Pulse Ox      








 Intake & Output











 01/09/18 01/10/18 01/10/18





 18:59 06:59 18:59


 


Weight   79.197 kg

## 2018-04-11 ENCOUNTER — HOSPITAL ENCOUNTER (OUTPATIENT)
Dept: HOSPITAL 47 - BARWHC3 | Age: 48
Discharge: HOME | End: 2018-04-11
Payer: COMMERCIAL

## 2018-04-11 VITALS
DIASTOLIC BLOOD PRESSURE: 75 MMHG | TEMPERATURE: 98.5 F | HEART RATE: 82 BPM | SYSTOLIC BLOOD PRESSURE: 142 MMHG | RESPIRATION RATE: 15 BRPM

## 2018-04-11 VITALS — BODY MASS INDEX: 30.5 KG/M2

## 2018-04-11 DIAGNOSIS — Z98.84: ICD-10-CM

## 2018-04-11 DIAGNOSIS — F32.9: ICD-10-CM

## 2018-04-11 DIAGNOSIS — Z09: Primary | ICD-10-CM

## 2018-04-11 DIAGNOSIS — M17.0: ICD-10-CM

## 2018-04-11 DIAGNOSIS — Z79.899: ICD-10-CM

## 2018-04-11 DIAGNOSIS — M16.0: ICD-10-CM

## 2018-04-11 DIAGNOSIS — R74.8: ICD-10-CM

## 2018-04-11 DIAGNOSIS — E55.9: ICD-10-CM

## 2018-04-11 DIAGNOSIS — M19.90: ICD-10-CM

## 2018-04-11 DIAGNOSIS — Z87.19: ICD-10-CM

## 2018-04-11 DIAGNOSIS — M79.3: ICD-10-CM

## 2018-04-11 DIAGNOSIS — K76.0: ICD-10-CM

## 2018-04-11 DIAGNOSIS — E03.9: ICD-10-CM

## 2018-04-11 PROCEDURE — 99211 OFF/OP EST MAY X REQ PHY/QHP: CPT

## 2018-04-28 NOTE — P.PN
Subjective


Progress Note Date: 04/11/18





DATE OF SERVICE:  04/11/2018





CHIEF COMPLAINT: Follow-up gastric bypass.





HISTORY OF PRESENT ILLNESS:   Jess Khoury is a 47-year-old female 


who is status post Garrett-en-Y gastric bypass on 10/31/2016. She is now 


1.5 years out.  She reports undergoing more pelvic surgery for mesh removal 

over 6 weeks ago.  He pelvic pain has improved.  She reports troubles with her 

pannus which has been long-standing for over 2 years.  She has been using 

Nystatin powder for over one half years.  She reports limitation of movement 

including exercise as a result of her pannus.  She reports lower back pain and 

painful sores of her abdomen.  Now she is evaluating for panniculectomy.  No 

reports of dumping syndrome.  





For her height of 5 foot 4 inches, her ideal body weight 


is 144 pounds. Today she comes in weighing 178 pounds. Her highest 


weight was 289 pounds. She has lost 111 pounds. Body mass index 


was reduced from 49.7 down to 30.5. She has achieved 77% excess weight loss.


She has gained 3 pounds in 3 months.  





PAST MEDICAL HISTORY: 


1. Morbid obesity, BMI 49.7 initial


2. Hypothyroidism. 


3. Anxiety.


4. Depression. 


5. Diabetes type 2. 


6. Panniculitis. 


7. Intra-abdominal adhesions


8. Chronic diarrhea. 


9. Gastroesophageal reflux disease. 


10. Rheumatoid Arthritis.


11.  Asthma.


12.  Hypertensive heart disease.





PAST SURGICAL HISTORY:  


1. Partial thyroidectomy. 


2. Hysterectomy. 


3. Bladder suspension. 


4. Upper endoscopy. 


5. Colonoscopy. 


6. Pelvic reconstruction at [facility] in January 2016.  


7. Cardiac catheterization one week ago. 


8. Status post Garrett-en-Y gastric bypass. 





MEDICATIONS:


1.  Methotrexate.


2.  Folic acid.


3.  Humira.


4.  Prilosec.


5.  Singulair.


6.  Hyzaar.


7.  Synthroid.


8.  Prozac.


9.  Nystatin Powder.





ALLERGIES: Resolved PENICILLIN. 





SOCIAL HISTORY: Lifelong nontobacco user. 





FAMILY HISTORY: Pertinent for fatty liver disease including gallbladder


disorders, also has morbid obesity in her family. 





REVIEW OF SYSTEMS:


CONSTITUTIONAL: For her height of 5 foot 4 inches, her ideal body weight 


is 144 pounds. Today she comes in weighing 178 pounds. Her highest 


weight was 289 pounds. She has lost 111 pounds. Body mass index 


was reduced from 49.7 down to 30.5. She has achieved 77% excess weight loss.


She has gained 3 pounds in 3 months.  


RESPIRATORY:  Has asthma.  Sleep apnea has resolved.  


CARDIOVASCULAR: She is on blood pressure medication secondary to minor 


stress in her life.  


MUSCULOSKELETAL: Moderate reduction of bilateral hip and knee and 


lower back pain. 


RHEUMATOLOGIC: Diagnosed with rheumatoid arthritis. 


GASTROINTESTINAL: No reports of gastroesophageal reflux which is 


resolved. Denies dumping syndrome otherwise.  


ENDOCRINE: History of hypothyroidism. Diabetes type 2 now resoled.


GENITOURINARY: History of pelvic reconstruction. Also history of 


bladder prolapse.  


HEENT: Denies troubles with vision or hearing. 


NEURO: Has headache and seizure disorders. 


PSYCH: Has anxiety including depression. 


HEMATOLOGIC: Denies any easy bruising. 





PHYSICAL EXAM:


VITAL SIGNS: 178 pounds. Height 5 ft 4 inches. Body mass index of 30.5.


 Vital Signs











Temp  98.5 F   04/11/18 17:54


 


Pulse  82   04/11/18 17:54


 


Resp  15   04/11/18 17:54


 


BP  142/75   04/11/18 17:54


 


Pulse Ox      











  


ABDOMEN: Soft, nontender, nondistended.  Hyperemia of the pannus.  Weight of 

pannus over 10 pounds.  Pannus over pubis 6 cm


GENERAL: Well-developed female in no acute distress. 


NEURO: No focal or lateralizing signs. Cranial nerves II through XII grossly 

within normal limits.  


PSYCH: Appropriate affect. Alert and oriented to person, place, and time.  


HEENT: No scleral icterus. Extraocular movements grossly intact. No nasal 

drainage.


NECK: Supple without lymphadenopathy. 


CHEST: Nonlabored respirations.  Equal bilateral excursions.


CARDIOVASCULAR: Regular rate. Regular rhythm.  2+ radial pulses.


MUSCULOSKELETAL: No clubbing, cyanosis, or edema. 


SKIN: Good skin turgor.  Well perfused.





ASSESSMENT:


1. Morbid obesity due to excess calories, now resolved. 


2. Body mass index reduced from 49.7 down to 30.5.


4. Hypothyroidism. 


5. Depression. 


6. Elevated liver enzymes, improved.


7. Fatty liver disease. 


8. Vitamin D deficiency. 


9. History of intra-abdominal adhesions. 


10. Diabetes type 2, non-insulin-dependent, resolved. 


11. Obstructive sleep apnea, resolved.


12. Gastroesophageal reflux disease, resolved.


13. Hypertensive heart disease, resolved.


14. Status post Garrett-en-Y gastric bypass. 


15. Osteoarthritis lower back, improved. 


16. Osteoarthritis of bilateral hips, improved. 


17. Osteoarthritis bilateral knees, improved. 


18. Panniculitis.





PLAN:


1.  She has a lifestyle limiting chronic infections of her pannus.  Despite 

nystatin powder over one year, she still has symptoms.


2.  Recommend panniculectomy.  Benefits and risks bleeding, infection, flap 

failure described in detail.  Placement of drains were described.


3.  DVT prophylaxis.


4.  Antibiotic prophylaxis.


5.  Will need at minimum of 4-6 weeks of recovery.

## 2018-05-23 ENCOUNTER — HOSPITAL ENCOUNTER (OUTPATIENT)
Dept: HOSPITAL 47 - LABPAT | Age: 48
Discharge: HOME | End: 2018-05-23
Payer: COMMERCIAL

## 2018-05-23 DIAGNOSIS — Z01.812: Primary | ICD-10-CM

## 2018-05-23 LAB
ALBUMIN SERPL-MCNC: 4.5 G/DL (ref 3.5–5)
ALP SERPL-CCNC: 45 U/L (ref 38–126)
ALT SERPL-CCNC: 39 U/L (ref 9–52)
ANION GAP SERPL CALC-SCNC: 13 MMOL/L
AST SERPL-CCNC: 38 U/L (ref 14–36)
BASOPHILS # BLD AUTO: 0.1 K/UL (ref 0–0.2)
BASOPHILS NFR BLD AUTO: 1 %
BUN SERPL-SCNC: 21 MG/DL (ref 7–17)
CALCIUM SPEC-MCNC: 10 MG/DL (ref 8.4–10.2)
CHLORIDE SERPL-SCNC: 100 MMOL/L (ref 98–107)
CO2 SERPL-SCNC: 29 MMOL/L (ref 22–30)
EOSINOPHIL # BLD AUTO: 0.2 K/UL (ref 0–0.7)
EOSINOPHIL NFR BLD AUTO: 3 %
ERYTHROCYTE [DISTWIDTH] IN BLOOD BY AUTOMATED COUNT: 4.23 M/UL (ref 3.8–5.4)
ERYTHROCYTE [DISTWIDTH] IN BLOOD: 13.3 % (ref 11.5–15.5)
GLUCOSE SERPL-MCNC: 81 MG/DL (ref 74–99)
HCT VFR BLD AUTO: 36.9 % (ref 34–46)
HGB BLD-MCNC: 12.3 GM/DL (ref 11.4–16)
LYMPHOCYTES # SPEC AUTO: 2.5 K/UL (ref 1–4.8)
LYMPHOCYTES NFR SPEC AUTO: 30 %
MCH RBC QN AUTO: 29.2 PG (ref 25–35)
MCHC RBC AUTO-ENTMCNC: 33.5 G/DL (ref 31–37)
MCV RBC AUTO: 87.3 FL (ref 80–100)
MONOCYTES # BLD AUTO: 0.5 K/UL (ref 0–1)
MONOCYTES NFR BLD AUTO: 6 %
NEUTROPHILS # BLD AUTO: 4.9 K/UL (ref 1.3–7.7)
NEUTROPHILS NFR BLD AUTO: 59 %
PLATELET # BLD AUTO: 324 K/UL (ref 150–450)
POTASSIUM SERPL-SCNC: 4.6 MMOL/L (ref 3.5–5.1)
PROT SERPL-MCNC: 7.1 G/DL (ref 6.3–8.2)
SODIUM SERPL-SCNC: 142 MMOL/L (ref 137–145)
WBC # BLD AUTO: 8.3 K/UL (ref 3.8–10.6)

## 2018-05-23 PROCEDURE — 36415 COLL VENOUS BLD VENIPUNCTURE: CPT

## 2018-05-23 PROCEDURE — 85025 COMPLETE CBC W/AUTO DIFF WBC: CPT

## 2018-05-23 PROCEDURE — 80053 COMPREHEN METABOLIC PANEL: CPT

## 2018-06-03 NOTE — P.GSHP
History of Present Illness


H&P Date: 18








DATE OF SERVICE:  2018





CHIEF COMPLAINT: Panniculitis





HISTORY OF PRESENT ILLNESS:   Jess Khoury is a 47-year-old female who is 

status post Garrett-en-Y gastric bypass on 10/31/2016. She is over


1.5 years out. She reports troubles with her pannus which has been long-

standing for over 2 years.  She has been using Nystatin powder for over one 

half years.  She reports limitation of movement including exercise as a result 

of her pannus.  She reports lower back pain and painful sores of her abdomen.  

Now she is evaluating for panniculectomy.  





For her height of 5 foot 4 inches, her ideal body weight is 144 pounds. Today 

she comes in weighing 178 pounds. Her highest weight was 289 pounds. She has 

lost 111 pounds. Body mass index was reduced from 49.7 down to 30.5. She has 

achieved 77% excess weight loss.





PAST MEDICAL HISTORY: 


1. Morbid obesity, BMI 49.7 initial


2. Hypothyroidism. 


3. Anxiety.


4. Depression. 


5. Diabetes type 2. 


6. Panniculitis. 


7. Intra-abdominal adhesions


8. Chronic diarrhea. 


9. Gastroesophageal reflux disease. 


10. Rheumatoid Arthritis.


11.  Asthma.


12.  Hypertensive heart disease.





PAST SURGICAL HISTORY:  


1. Partial thyroidectomy. 


2. Hysterectomy. 


3. Bladder suspension. 


4. Upper endoscopy. 


5. Colonoscopy. 


6. Pelvic reconstruction at [facility] in 2016.  


7. Cardiac catheterization one week ago. 


8. Status post Garrett-en-Y gastric bypass. 





MEDICATIONS:


1.  Methotrexate.


2.  Folic acid.


3.  Humira.


4.  Prilosec.


5.  Singulair.


6.  Hyzaar.


7.  Synthroid.


8.  Prozac.


9.  Nystatin Powder.





ALLERGIES: Resolved PENICILLIN. 





SOCIAL HISTORY: Lifelong nontobacco user. 





FAMILY HISTORY: Pertinent for fatty liver disease including gallbladder


disorders, also has morbid obesity in her family. 





REVIEW OF SYSTEMS:


CONSTITUTIONAL: For her height of 5 foot 4 inches, her ideal body weight 


is 144 pounds. Today she comes in weighing 178 pounds. Her highest 


weight was 289 pounds. She has lost 111 pounds. Body mass index 


was reduced from 49.7 down to 30.5. She has achieved 77% excess weight loss.


She has gained 3 pounds in 3 months.  


RESPIRATORY:  Has asthma.  Sleep apnea has resolved.  


CARDIOVASCULAR: She is on blood pressure medication secondary to minor 


stress in her life.  


MUSCULOSKELETAL: Moderate reduction of bilateral hip and knee and 


lower back pain. 


RHEUMATOLOGIC: Diagnosed with rheumatoid arthritis. 


GASTROINTESTINAL: No reports of gastroesophageal reflux which is 


resolved. Denies dumping syndrome otherwise.  


ENDOCRINE: History of hypothyroidism. Diabetes type 2 now resoled.


GENITOURINARY: History of pelvic reconstruction. Also history of 


bladder prolapse.  


HEENT: Denies troubles with vision or hearing. 


NEURO: Has headache and seizure disorders. 


PSYCH: Has anxiety including depression. 


HEMATOLOGIC: Denies any easy bruising. 





PHYSICAL EXAM:


VITAL SIGNS: 178 pounds. Height 5 ft 4 inches. Body mass index of 30.5.


  


ABDOMEN: Soft, nontender, nondistended.  Hyperemia of the pannus.  Weight of 

pannus over 10 pounds.  Pannus over pubis 6 cm


GENERAL: Well-developed female in no acute distress. 


NEURO: No focal or lateralizing signs. Cranial nerves II through XII grossly 

within normal limits.  


PSYCH: Appropriate affect. Alert and oriented to person, place, and time.  


HEENT: No scleral icterus. Extraocular movements grossly intact. No nasal 

drainage.


NECK: Supple without lymphadenopathy. 


CHEST: Nonlabored respirations.  Equal bilateral excursions.


CARDIOVASCULAR: Regular rate. Regular rhythm.  2+ radial pulses.


MUSCULOSKELETAL: No clubbing, cyanosis, or edema. 


SKIN: Good skin turgor.  Well perfused.





ASSESSMENT:


1. Morbid obesity due to excess calories, now resolved. 


2. Body mass index reduced from 49.7 down to 30.5.


4. Hypothyroidism. 


5. Depression. 


6. Elevated liver enzymes, improved.


7. Fatty liver disease. 


8. Vitamin D deficiency. 


9. History of intra-abdominal adhesions. 


10. Diabetes type 2, non-insulin-dependent, resolved. 


11. Obstructive sleep apnea, resolved.


12. Gastroesophageal reflux disease, resolved.


13. Hypertensive heart disease, resolved.


14. Status post Garrett-en-Y gastric bypass. 


15. Osteoarthritis lower back, improved. 


16. Osteoarthritis of bilateral hips, improved. 


17. Osteoarthritis bilateral knees, improved. 


18. Panniculitis.





PLAN:


1.  She has a lifestyle limiting chronic infections of her pannus.  Despite 

nystatin powder over one year, she still has symptoms.


2.  Recommend panniculectomy.  Benefits and risks bleeding, infection, flap 

failure described in detail.  Placement of drains were described.


3.  DVT prophylaxis.


4.  Antibiotic prophylaxis.


5.  Will need at minimum of 4-6 weeks of recovery.





Past Medical History


Past Medical History: Asthma, Chest Pain / Angina, Diabetes Mellitus, 

Fibromyalgia, GERD/Reflux, Hyperlipidemia, Hypertension, Osteoarthritis (OA), 

Sleep Apnea/CPAP/BIPAP, Thyroid Disorder


Additional Past Medical History / Comment(s): USES C-PAP MACHINE, DDD WITH BACK 

PAIN, FATTY LIVER. ,TORN LT ROTATOR CUFF 2016


History of Any Multi-Drug Resistant Organisms: None Reported


Past Surgical History: Appendectomy, Bariatric Surgery, Bladder Surgery, 

Cholecystectomy, Heart Catheterization, Hysterectomy, Tubal Ligation


Additional Past Surgical History / Comment(s): partial thyroidectomy, bladder 

suspension w/mesh (as of 18 patient has since undergone sx 4x to have 

various portions of mesh removed), sinus repair surgery, bilateral knee surgery 

(meniscus, femur fracture on rt) 10-31-16 GARRETT EN Y GASTRIC BYPASS, EGD, 

COLONOSCOPY


Past Anesthesia/Blood Transfusion Reactions: Family History of Problems w/ 

Anesthesia, Motion Sickness


Additional Past Anesthesia/Blood Transfusion Reaction / Comment(s): Pt has 

difficulty waking up.  Very difficult intubation.  Pts grandmother has 

difficulty waking and also difficult intubation.


Smoking Status: Never smoker





- Past Family History


  ** Mother


Family Medical History: Cancer


Additional Family Medical History / Comment(s): Breast CA





  ** Father


Family Medical History: Cancer, CVA/TIA


Additional Family Medical History / Comment(s): - stroke





Medications and Allergies


 Home Medications











 Medication  Instructions  Recorded  Confirmed  Type


 


Levothyroxine Sodium [Synthroid] 150 mcg PO DAILY 02/27/15 05/22/18 History


 


Nystatin 100,000 Unit/gm Powd 1 applic TOPICAL BID PRN 02/27/15 05/22/18 History





[Mycostatin Powder]    


 


Omeprazole [PriLOSEC] 40 mg PO DAILY 10/15/16 05/22/18 History


 


FLUoxetine HCL [PROzac] 40 mg PO DAILY 16 History


 


Losartan-Hctz 50-12.5 mg [Hyzaar 1 tab PO DAILY 17 History





50-12.5]    


 


Adalimumab [Humira] 10 mg SQ Q14D 11/15/17 05/22/18 History


 


Folic Acid 0.4 mg PO DAILY 11/15/17 05/22/18 History


 


Methotrexate Sodium [Methotrexate] 17.5 mg PO FR 11/15/17 05/22/18 History


 


Gabapentin [Neurontin] 300 mg PO DAILY 18 History


 


Cetirizine HCl [Zyrtec] 10 mg PO HS 18 History


 


Multivitamins, Thera [Multivitamin 1 tab PO DAILY 18 History





(formulary)]    


 


Vit B12 Dose Unknown 1 each PO DAILY 18  History


 


Vit B6 Stress Formula 1 each PO DAILY 18  History











 Allergies











Allergy/AdvReac Type Severity Reaction Status Date / Time


 


No Known Allergies Allergy   Verified 18 10:47

## 2018-06-04 ENCOUNTER — HOSPITAL ENCOUNTER (INPATIENT)
Dept: HOSPITAL 47 - OR | Age: 48
LOS: 3 days | Discharge: HOME | DRG: 989 | End: 2018-06-07
Payer: COMMERCIAL

## 2018-06-04 VITALS — BODY MASS INDEX: 29.7 KG/M2

## 2018-06-04 DIAGNOSIS — Z90.710: ICD-10-CM

## 2018-06-04 DIAGNOSIS — M16.0: ICD-10-CM

## 2018-06-04 DIAGNOSIS — E78.5: ICD-10-CM

## 2018-06-04 DIAGNOSIS — E03.9: ICD-10-CM

## 2018-06-04 DIAGNOSIS — K76.0: ICD-10-CM

## 2018-06-04 DIAGNOSIS — F32.9: ICD-10-CM

## 2018-06-04 DIAGNOSIS — Z84.89: ICD-10-CM

## 2018-06-04 DIAGNOSIS — M79.3: Primary | ICD-10-CM

## 2018-06-04 DIAGNOSIS — Z80.3: ICD-10-CM

## 2018-06-04 DIAGNOSIS — Z79.899: ICD-10-CM

## 2018-06-04 DIAGNOSIS — E55.9: ICD-10-CM

## 2018-06-04 DIAGNOSIS — M79.7: ICD-10-CM

## 2018-06-04 DIAGNOSIS — Z82.3: ICD-10-CM

## 2018-06-04 DIAGNOSIS — Z98.84: ICD-10-CM

## 2018-06-04 DIAGNOSIS — K43.9: ICD-10-CM

## 2018-06-04 DIAGNOSIS — E65: ICD-10-CM

## 2018-06-04 DIAGNOSIS — Z79.890: ICD-10-CM

## 2018-06-04 DIAGNOSIS — M47.9: ICD-10-CM

## 2018-06-04 DIAGNOSIS — M06.9: ICD-10-CM

## 2018-06-04 DIAGNOSIS — M17.0: ICD-10-CM

## 2018-06-04 DIAGNOSIS — F41.9: ICD-10-CM

## 2018-06-04 DIAGNOSIS — J45.909: ICD-10-CM

## 2018-06-04 DIAGNOSIS — K52.9: ICD-10-CM

## 2018-06-04 DIAGNOSIS — R33.9: ICD-10-CM

## 2018-06-04 LAB — GLUCOSE BLD-MCNC: 82 MG/DL (ref 75–99)

## 2018-06-04 PROCEDURE — 83540 ASSAY OF IRON: CPT

## 2018-06-04 PROCEDURE — 85027 COMPLETE CBC AUTOMATED: CPT

## 2018-06-04 PROCEDURE — 83550 IRON BINDING TEST: CPT

## 2018-06-04 PROCEDURE — 82728 ASSAY OF FERRITIN: CPT

## 2018-06-04 PROCEDURE — 0HB7XZZ EXCISION OF ABDOMEN SKIN, EXTERNAL APPROACH: ICD-10-PCS

## 2018-06-04 PROCEDURE — 85025 COMPLETE CBC W/AUTO DIFF WBC: CPT

## 2018-06-04 PROCEDURE — 0WQF0ZZ REPAIR ABDOMINAL WALL, OPEN APPROACH: ICD-10-PCS

## 2018-06-04 PROCEDURE — 94760 N-INVAS EAR/PLS OXIMETRY 1: CPT

## 2018-06-04 RX ADMIN — ONDANSETRON PRN MG: 2 INJECTION INTRAMUSCULAR; INTRAVENOUS at 19:46

## 2018-06-04 RX ADMIN — HYDROCODONE BITARTRATE AND ACETAMINOPHEN SCH EACH: 5; 325 TABLET ORAL at 23:50

## 2018-06-04 RX ADMIN — HYDROMORPHONE HYDROCHLORIDE PRN MG: 1 INJECTION, SOLUTION INTRAMUSCULAR; INTRAVENOUS; SUBCUTANEOUS at 21:18

## 2018-06-04 RX ADMIN — ONDANSETRON ONE MG: 2 INJECTION INTRAMUSCULAR; INTRAVENOUS at 16:55

## 2018-06-04 RX ADMIN — LORATADINE SCH MG: 10 TABLET ORAL at 19:46

## 2018-06-04 RX ADMIN — CEFAZOLIN SCH GM: 10 INJECTION, POWDER, FOR SOLUTION INTRAVENOUS at 21:19

## 2018-06-04 RX ADMIN — HYDROMORPHONE HYDROCHLORIDE PRN MG: 1 INJECTION, SOLUTION INTRAMUSCULAR; INTRAVENOUS; SUBCUTANEOUS at 16:45

## 2018-06-04 RX ADMIN — POTASSIUM CHLORIDE SCH MLS: 14.9 INJECTION, SOLUTION INTRAVENOUS at 13:17

## 2018-06-04 RX ADMIN — THIAMINE HYDROCHLORIDE SCH MLS/HR: 100 INJECTION, SOLUTION INTRAMUSCULAR; INTRAVENOUS at 18:51

## 2018-06-04 RX ADMIN — HYDROMORPHONE HYDROCHLORIDE PRN MG: 1 INJECTION, SOLUTION INTRAMUSCULAR; INTRAVENOUS; SUBCUTANEOUS at 16:38

## 2018-06-04 RX ADMIN — HYDROMORPHONE HYDROCHLORIDE PRN MG: 1 INJECTION, SOLUTION INTRAMUSCULAR; INTRAVENOUS; SUBCUTANEOUS at 17:21

## 2018-06-04 RX ADMIN — POTASSIUM CHLORIDE SCH: 14.9 INJECTION, SOLUTION INTRAVENOUS at 17:21

## 2018-06-04 RX ADMIN — HYDROCODONE BITARTRATE AND ACETAMINOPHEN SCH EACH: 5; 325 TABLET ORAL at 19:45

## 2018-06-04 RX ADMIN — ONDANSETRON ONE MG: 2 INJECTION INTRAMUSCULAR; INTRAVENOUS at 13:36

## 2018-06-04 NOTE — P.PCN
Date of Procedure: 06/04/18


Preoperative Diagnosis: 


Adiposis panniculus, Central adiposity, panniculitis


Postoperative Diagnosis: 


Same, ventral hernia 11 x 18 cm, reducible, initial unrelated to previous 

bariatric procedure


Procedure(s) Performed: 


Panniculectomy, 4.52 pounds, ventral hernia repair without mesh 11 x 18 cm


Anesthesia: CROW


Surgeon: Jane Mercer


Assistant #1: Josh Hanna


Estimated Blood Loss (ml): 400


Pathology: other (Panniculectomy)


Condition: stable


Disposition: floor


Operative Findings: 





1.  Ventral hernia repair with fascial imbrication 3


2.  Pannus, 4.52 pounds excised

## 2018-06-05 LAB
BASOPHILS # BLD AUTO: 0 K/UL (ref 0–0.2)
BASOPHILS NFR BLD AUTO: 0 %
EOSINOPHIL # BLD AUTO: 0.1 K/UL (ref 0–0.7)
EOSINOPHIL NFR BLD AUTO: 1 %
ERYTHROCYTE [DISTWIDTH] IN BLOOD BY AUTOMATED COUNT: 3.33 M/UL (ref 3.8–5.4)
ERYTHROCYTE [DISTWIDTH] IN BLOOD BY AUTOMATED COUNT: 3.63 M/UL (ref 3.8–5.4)
ERYTHROCYTE [DISTWIDTH] IN BLOOD: 13.6 % (ref 11.5–15.5)
ERYTHROCYTE [DISTWIDTH] IN BLOOD: 14 % (ref 11.5–15.5)
HCT VFR BLD AUTO: 29.6 % (ref 34–46)
HCT VFR BLD AUTO: 32.6 % (ref 34–46)
HGB BLD-MCNC: 10.2 GM/DL (ref 11.4–16)
HGB BLD-MCNC: 9.6 GM/DL (ref 11.4–16)
LYMPHOCYTES # SPEC AUTO: 1.6 K/UL (ref 1–4.8)
LYMPHOCYTES NFR SPEC AUTO: 14 %
MCH RBC QN AUTO: 28.2 PG (ref 25–35)
MCH RBC QN AUTO: 28.8 PG (ref 25–35)
MCHC RBC AUTO-ENTMCNC: 31.4 G/DL (ref 31–37)
MCHC RBC AUTO-ENTMCNC: 32.4 G/DL (ref 31–37)
MCV RBC AUTO: 88.8 FL (ref 80–100)
MCV RBC AUTO: 90 FL (ref 80–100)
MONOCYTES # BLD AUTO: 0.7 K/UL (ref 0–1)
MONOCYTES NFR BLD AUTO: 6 %
NEUTROPHILS # BLD AUTO: 8.8 K/UL (ref 1.3–7.7)
NEUTROPHILS NFR BLD AUTO: 77 %
PLATELET # BLD AUTO: 222 K/UL (ref 150–450)
PLATELET # BLD AUTO: 286 K/UL (ref 150–450)
WBC # BLD AUTO: 11.4 K/UL (ref 3.8–10.6)
WBC # BLD AUTO: 9 K/UL (ref 3.8–10.6)

## 2018-06-05 RX ADMIN — TAMSULOSIN HYDROCHLORIDE SCH MG: 0.4 CAPSULE ORAL at 16:51

## 2018-06-05 RX ADMIN — HYDROCODONE BITARTRATE AND ACETAMINOPHEN SCH EACH: 5; 325 TABLET ORAL at 03:52

## 2018-06-05 RX ADMIN — ENOXAPARIN SODIUM SCH MG: 40 INJECTION SUBCUTANEOUS at 07:58

## 2018-06-05 RX ADMIN — GABAPENTIN SCH MG: 300 CAPSULE ORAL at 07:59

## 2018-06-05 RX ADMIN — HYDROMORPHONE HYDROCHLORIDE PRN MG: 1 INJECTION, SOLUTION INTRAMUSCULAR; INTRAVENOUS; SUBCUTANEOUS at 15:15

## 2018-06-05 RX ADMIN — HYDROMORPHONE HYDROCHLORIDE PRN MG: 1 INJECTION, SOLUTION INTRAMUSCULAR; INTRAVENOUS; SUBCUTANEOUS at 19:19

## 2018-06-05 RX ADMIN — HYDROCODONE BITARTRATE AND ACETAMINOPHEN SCH EACH: 5; 325 TABLET ORAL at 11:57

## 2018-06-05 RX ADMIN — THIAMINE HYDROCHLORIDE SCH MLS/HR: 100 INJECTION, SOLUTION INTRAMUSCULAR; INTRAVENOUS at 07:54

## 2018-06-05 RX ADMIN — HYDROMORPHONE HYDROCHLORIDE PRN MG: 1 INJECTION, SOLUTION INTRAMUSCULAR; INTRAVENOUS; SUBCUTANEOUS at 00:17

## 2018-06-05 RX ADMIN — HYDROMORPHONE HYDROCHLORIDE PRN MG: 1 INJECTION, SOLUTION INTRAMUSCULAR; INTRAVENOUS; SUBCUTANEOUS at 09:49

## 2018-06-05 RX ADMIN — HYDROCODONE BITARTRATE AND ACETAMINOPHEN SCH EACH: 5; 325 TABLET ORAL at 16:13

## 2018-06-05 RX ADMIN — LOSARTAN POTASSIUM AND HYDROCHLOROTHIAZIDE SCH: 12.5; 5 TABLET ORAL at 10:50

## 2018-06-05 RX ADMIN — FOLIC ACID SCH MG: 1 TABLET ORAL at 07:59

## 2018-06-05 RX ADMIN — POTASSIUM CHLORIDE SCH: 14.9 INJECTION, SOLUTION INTRAVENOUS at 12:51

## 2018-06-05 RX ADMIN — HYDROCODONE BITARTRATE AND ACETAMINOPHEN SCH EACH: 5; 325 TABLET ORAL at 19:50

## 2018-06-05 RX ADMIN — HYDROMORPHONE HYDROCHLORIDE PRN MG: 1 INJECTION, SOLUTION INTRAMUSCULAR; INTRAVENOUS; SUBCUTANEOUS at 22:20

## 2018-06-05 RX ADMIN — HYDROCODONE BITARTRATE AND ACETAMINOPHEN SCH EACH: 5; 325 TABLET ORAL at 23:31

## 2018-06-05 RX ADMIN — LORATADINE SCH MG: 10 TABLET ORAL at 19:51

## 2018-06-05 RX ADMIN — LEVOTHYROXINE SODIUM SCH MCG: 75 TABLET ORAL at 05:44

## 2018-06-05 RX ADMIN — HYDROCODONE BITARTRATE AND ACETAMINOPHEN SCH EACH: 5; 325 TABLET ORAL at 07:49

## 2018-06-05 RX ADMIN — CEFAZOLIN SCH GM: 10 INJECTION, POWDER, FOR SOLUTION INTRAVENOUS at 05:44

## 2018-06-05 RX ADMIN — HYDROMORPHONE HYDROCHLORIDE PRN MG: 1 INJECTION, SOLUTION INTRAMUSCULAR; INTRAVENOUS; SUBCUTANEOUS at 05:49

## 2018-06-05 RX ADMIN — THIAMINE HYDROCHLORIDE SCH MLS/HR: 100 INJECTION, SOLUTION INTRAMUSCULAR; INTRAVENOUS at 00:34

## 2018-06-05 RX ADMIN — THIAMINE HYDROCHLORIDE SCH MLS/HR: 100 INJECTION, SOLUTION INTRAMUSCULAR; INTRAVENOUS at 16:15

## 2018-06-05 RX ADMIN — THERA TABS SCH EACH: TAB at 11:57

## 2018-06-05 NOTE — P.PN
<JesusBriannaTere M - Last Filed: 06/05/18 11:03>





Subjective


Progress Note Date: 06/05/18





47-year-old female seen and examined at bedtime no new events.  Patient reports 

having surgical discomfort pain medication effective for pain control.  Patient 

is   postop Panniculectomy, 4.52 pounds, ventral hernia repair without mesh due 

to Adiposis panniculus, Central adiposity, panniculitis 


patient is status post jenny-en-y gastric bypass October 2016. 





White count 11.4 hemoglobin 10 afebrile heart rate 60s to 80s sats on 2 L 98%





Objective





- Vital Signs


Vital signs: 


 Vital Signs











Temp  97.3 F L  06/05/18 07:00


 


Pulse  63   06/05/18 07:00


 


Resp  16   06/05/18 07:00


 


BP  107/60   06/05/18 07:00


 


Pulse Ox  98   06/05/18 07:45








 Intake & Output











 06/04/18 06/05/18 06/05/18





 18:59 06:59 18:59


 


Intake Total 2350 1725 


 


Output Total 600 665 250


 


Balance 1750 1060 -250


 


Weight 76.204 kg  


 


Intake:   


 


  IV 2350  


 


  Intake, IV Titration  1725 





  Amount   


 


    0.9% NaCl with KCl 20 Meq  1725 





    /l 1,000 ml @ 150 mls/hr   





    IV .Q6H40M [facility] Rx#:   





    [id number]   


 


Output:   


 


  Drainage 50 165 


 


    Right abdomen 10 60 


 


    left abdomen 40 105 


 


  Urine 150 500 250


 


    Uretheral (Johnson)  500 250


 


  Estimated Blood Loss 400  


 


Other:   


 


  Voiding Method  Indwelling Catheter Indwelling Catheter














- Exam





Physical exam


47-year-old female sitting up on the edge of the bed physical therapy at the 

bedside


Lungs adequate air movement bilaterally


Heart S1-S2 audible regular 


Abdomen abdominal binder in place with DIEGO drain in place


Extremities no edema noted





- Labs


CBC & Chem 7: 


 06/05/18 06:36





Labs: 


 Abnormal Lab Results - Last 24 Hours (Table)











  06/05/18 Range/Units





  06:36 


 


WBC  11.4 H  (3.8-10.6)  k/uL


 


RBC  3.63 L  (3.80-5.40)  m/uL


 


Hgb  10.2 L  (11.4-16.0)  gm/dL


 


Hct  32.6 L  (34.0-46.0)  %


 


Neutrophils #  8.8 H  (1.3-7.7)  k/uL














Assessment and Plan


Assessment: 





Impression





Status post June 4  Panniculectomy, 4.52 pounds removed, ventral hernia repair 

without mesh 11 x 18 cm


Adiposis panniculus, Central adiposity, panniculitis


Status post Jenny-en-Y gastric bypass 10/31/2016


History of morbid obesity initial BMI 49


Anxiety depressive disorder


Rheumatoid arthritis


Hypothyroid


Achieved 77% excessive weight loss BMI initially 49 down to 30














Plan


Continue postop surgical care


Do not remove the abdominal binder


PT OT eval


Pain control


Home meds as appropriate


DVT and GI prophylaxis


Anticipate discharge in the next 24 hours


Increase activity











The above impression and plan of care have been discussed and directed by 

signing physician. Tere Lee nurse practitioner acting as scribe for signing 

physician.





<Jane Mercer N - Last Filed: 06/06/18 20:15>





Objective





- Vital Signs


Vital signs: 


 Vital Signs











Temp  99.8 F H  06/06/18 19:25


 


Pulse  76   06/06/18 19:25


 


Resp  18   06/06/18 19:25


 


BP  145/85   06/06/18 19:25


 


Pulse Ox  92 L  06/06/18 19:25








 Intake & Output











 06/06/18 06/06/18 06/07/18





 06:59 18:59 06:59


 


Intake Total 1630 500 


 


Output Total 915 1525 


 


Balance 715 -1025 


 


Intake:   


 


  Intake, IV Titration 1150  





  Amount   


 


    0.9% NaCl with KCl 20 Meq 1150  





    /l 1,000 ml @ 150 mls/hr   





    IV .Q6H40M [facility] Rx#:   





    [id number]   


 


  Oral 480 500 


 


Output:   


 


  Drainage 165 50 


 


    Right abdomen 100 25 


 


    left abdomen 65 25 


 


  Urine 750 1475 


 


    Straight 750  


 


    Uretheral (Johnson)  1175 


 


Other:   


 


  Voiding Method  Indwelling Catheter 














- Labs


CBC & Chem 7: 


 06/05/18 22:36





Labs: 


 Abnormal Lab Results - Last 24 Hours (Table)











  06/05/18 Range/Units





  22:36 


 


RBC  3.33 L  (3.80-5.40)  m/uL


 


Hgb  9.6 L  (11.4-16.0)  gm/dL


 


Hct  29.6 L  (34.0-46.0)  %

## 2018-06-06 VITALS — RESPIRATION RATE: 18 BRPM

## 2018-06-06 RX ADMIN — LOSARTAN POTASSIUM AND HYDROCHLOROTHIAZIDE SCH: 12.5; 5 TABLET ORAL at 08:52

## 2018-06-06 RX ADMIN — FOLIC ACID SCH MG: 1 TABLET ORAL at 08:54

## 2018-06-06 RX ADMIN — ENOXAPARIN SODIUM SCH MG: 40 INJECTION SUBCUTANEOUS at 08:54

## 2018-06-06 RX ADMIN — POTASSIUM CHLORIDE SCH: 14.9 INJECTION, SOLUTION INTRAVENOUS at 13:45

## 2018-06-06 RX ADMIN — ONDANSETRON PRN MG: 2 INJECTION INTRAMUSCULAR; INTRAVENOUS at 12:24

## 2018-06-06 RX ADMIN — HYDROCODONE BITARTRATE AND ACETAMINOPHEN SCH EACH: 5; 325 TABLET ORAL at 07:54

## 2018-06-06 RX ADMIN — HYDROCODONE BITARTRATE AND ACETAMINOPHEN PRN EACH: 5; 325 TABLET ORAL at 12:19

## 2018-06-06 RX ADMIN — PANTOPRAZOLE SODIUM SCH MG: 40 TABLET, DELAYED RELEASE ORAL at 21:21

## 2018-06-06 RX ADMIN — HYDROCODONE BITARTRATE AND ACETAMINOPHEN SCH EACH: 5; 325 TABLET ORAL at 03:55

## 2018-06-06 RX ADMIN — TAMSULOSIN HYDROCHLORIDE SCH MG: 0.4 CAPSULE ORAL at 20:36

## 2018-06-06 RX ADMIN — HYDROCODONE BITARTRATE AND ACETAMINOPHEN PRN EACH: 5; 325 TABLET ORAL at 20:36

## 2018-06-06 RX ADMIN — THERA TABS SCH EACH: TAB at 12:19

## 2018-06-06 RX ADMIN — LEVOTHYROXINE SODIUM SCH MCG: 75 TABLET ORAL at 04:37

## 2018-06-06 RX ADMIN — LORATADINE SCH MG: 10 TABLET ORAL at 20:36

## 2018-06-06 RX ADMIN — HYDROCODONE BITARTRATE AND ACETAMINOPHEN PRN EACH: 5; 325 TABLET ORAL at 16:04

## 2018-06-06 RX ADMIN — ONDANSETRON PRN MG: 2 INJECTION INTRAMUSCULAR; INTRAVENOUS at 02:05

## 2018-06-06 RX ADMIN — HYDROMORPHONE HYDROCHLORIDE PRN MG: 1 INJECTION, SOLUTION INTRAMUSCULAR; INTRAVENOUS; SUBCUTANEOUS at 04:17

## 2018-06-06 RX ADMIN — GABAPENTIN SCH MG: 300 CAPSULE ORAL at 08:54

## 2018-06-06 NOTE — P.PN
<Brianna Leene M - Last Filed: 06/06/18 08:59>





Subjective


Progress Note Date: 06/06/18





47-year-old female seen and examined.  Patient stated that she just ambulating 

in the hallway.  Continues to have episodes a urinary retention requiring 

straight cath.   230 in the morning post void residual was 250 patient states 

after surgery often has urinary retention issues.  Flomax was started.  

Additionally patient states still needing to take IV dilaudid for pain control 

states is tolerating a diet





postop Panniculectomy, 4.52 pounds, ventral hernia repair without mesh due to 

Adiposis panniculus, Central adiposity, panniculitis done on [facility] fourth


patient is status post jenny-en-y gastric bypass October 2016. 





Objective





- Vital Signs


Vital signs: 


 Vital Signs











Temp  99.3 F   06/06/18 07:00


 


Pulse  88   06/06/18 07:00


 


Resp  17   06/06/18 07:00


 


BP  112/70   06/06/18 07:00


 


Pulse Ox  92 L  06/06/18 07:00








 Intake & Output











 06/05/18 06/06/18 06/06/18





 18:59 06:59 18:59


 


Intake Total 360 1630 


 


Output Total 705 915 


 


Balance -345 715 


 


Intake:   


 


  Intake, IV Titration  1150 





  Amount   


 


    0.9% NaCl with KCl 20 Meq  1150 





    /l 1,000 ml @ 150 mls/hr   





    IV .Q6H40M [facility] Rx#:   





    [id number]   


 


  Oral 360 480 


 


Output:   


 


  Drainage 55 165 


 


    Right abdomen 30 100 


 


    left abdomen 25 65 


 


  Urine 650 750 


 


    Straight 400 750 


 


    Uretheral (Johnson) 250  


 


Other:   


 


  Voiding Method Indwelling Catheter  














- Exam





Physical exam


47-year-old female resting in bed appears in no acute distress been up 

ambulating in the hallway


Lungs adequate air movement bilaterally on room air sats are 92% no cough noted


Heart S1-S2 audible regular


Abdomen surgical abdominal binder in place 2 DIEGO drains in place surgical 

tenderness appropriate states no nausea vomiting.  Does report urinary 

retention not able to urinate no stool states is not passing gas 


Extremities no edema noted





- Labs


CBC & Chem 7: 


 06/05/18 22:36





Labs: 


 Abnormal Lab Results - Last 24 Hours (Table)











  06/05/18 Range/Units





  22:36 


 


RBC  3.33 L  (3.80-5.40)  m/uL


 


Hgb  9.6 L  (11.4-16.0)  gm/dL


 


Hct  29.6 L  (34.0-46.0)  %














Assessment and Plan


Assessment: 





Impression





Status post June 4  Panniculectomy, 4.52 pounds removed, ventral hernia repair 

without mesh 11 x 18 cm


Adiposis panniculus, Central adiposity, panniculitis


Status post Jenny-en-Y gastric bypass 10/31/2016


History of morbid obesity initial BMI 49


Anxiety depressive disorder


Rheumatoid arthritis


Hypothyroid


Achieved 77% excessive weight loss BMI initially 49 down to 30














Plan


Continue flomax as ordered


Continue postop surgical care


Do not remove the abdominal binder


PT OT eval


Pain control


Home meds as appropriate


DVT and GI prophylaxis


Anticipate discharge in the next 24 hours


Increase activity











The above impression and plan of care have been discussed and directed by 

signing physician. Tere Lee nurse practitioner acting as scribe for signing 

physician.





<Jane Mercer N - Last Filed: 06/06/18 20:15>





Objective





- Vital Signs


Vital signs: 


 Vital Signs











Temp  99.8 F H  06/06/18 19:25


 


Pulse  76   06/06/18 19:25


 


Resp  18   06/06/18 19:25


 


BP  145/85   06/06/18 19:25


 


Pulse Ox  92 L  06/06/18 19:25








 Intake & Output











 06/06/18 06/06/18 06/07/18





 06:59 18:59 06:59


 


Intake Total 1630 500 


 


Output Total 915 1525 


 


Balance 715 -1025 


 


Intake:   


 


  Intake, IV Titration 1150  





  Amount   


 


    0.9% NaCl with KCl 20 Meq 1150  





    /l 1,000 ml @ 150 mls/hr   





    IV .Q6H40M [facility] Rx#:   





    [id number]   


 


  Oral 480 500 


 


Output:   


 


  Drainage 165 50 


 


    Right abdomen 100 25 


 


    left abdomen 65 25 


 


  Urine 750 1475 


 


    Straight 750  


 


    Uretheral (Johnson)  1175 


 


Other:   


 


  Voiding Method  Indwelling Catheter 














- Labs


CBC & Chem 7: 


 06/05/18 22:36





Labs: 


 Abnormal Lab Results - Last 24 Hours (Table)











  06/05/18 Range/Units





  22:36 


 


RBC  3.33 L  (3.80-5.40)  m/uL


 


Hgb  9.6 L  (11.4-16.0)  gm/dL


 


Hct  29.6 L  (34.0-46.0)  %

## 2018-06-06 NOTE — P.PN
Progress Note - Text


Progress Note Date: 06/06/18





Opiod prescription form filled with questions answered of use, misuse and 

disposal of narcotics. Norco 7.5 mg 40 tabs no refill for 1 week written. 

Drains at bedside stripped. Abdominal flaps viable. No infection or cellulitis. 

Abdominal binder re-fitted. Plan for discharge tomorrow. All questions 

answered.  Alex discontinued at bedside by me.

## 2018-06-07 VITALS — DIASTOLIC BLOOD PRESSURE: 84 MMHG | HEART RATE: 90 BPM | TEMPERATURE: 98.5 F | SYSTOLIC BLOOD PRESSURE: 139 MMHG

## 2018-06-07 RX ADMIN — LEVOTHYROXINE SODIUM SCH MCG: 75 TABLET ORAL at 06:19

## 2018-06-07 RX ADMIN — ENOXAPARIN SODIUM SCH MG: 40 INJECTION SUBCUTANEOUS at 08:44

## 2018-06-07 RX ADMIN — HYDROCODONE BITARTRATE AND ACETAMINOPHEN PRN EACH: 5; 325 TABLET ORAL at 05:11

## 2018-06-07 RX ADMIN — ONDANSETRON PRN MG: 2 INJECTION INTRAMUSCULAR; INTRAVENOUS at 08:36

## 2018-06-07 RX ADMIN — FOLIC ACID SCH: 1 TABLET ORAL at 10:33

## 2018-06-07 RX ADMIN — GABAPENTIN SCH: 300 CAPSULE ORAL at 10:33

## 2018-06-07 RX ADMIN — HYDROCODONE BITARTRATE AND ACETAMINOPHEN PRN EACH: 5; 325 TABLET ORAL at 09:11

## 2018-06-07 RX ADMIN — ONDANSETRON PRN MG: 2 INJECTION INTRAMUSCULAR; INTRAVENOUS at 01:42

## 2018-06-07 RX ADMIN — LOSARTAN POTASSIUM AND HYDROCHLOROTHIAZIDE SCH: 12.5; 5 TABLET ORAL at 10:34

## 2018-06-07 RX ADMIN — PANTOPRAZOLE SODIUM SCH: 40 TABLET, DELAYED RELEASE ORAL at 10:34

## 2018-06-07 NOTE — P.DS
Providers


Date of admission: 


06/04/18 16:30





Expected date of discharge: 06/07/18


Attending physician: 


Jane Mercer





Primary care physician: 


Ky Michelle MD





Hospital Course: 


47-year-old female who is status post Garrett-en-Y gastric bypass on 10/31/2016.  

For treatment of morbid obesity.  The BMI was reduced from 49-30 patient 

achieved 77 percent excessive weight loss.  Patient was having significant 

discomfort  difficult to ambulate due to excessive panniculus 





patient presented to undergo an elective Panniculectomy, 4.52 pounds, ventral 

hernia repair without mesh due to Adiposis panniculus, Central adiposity, 

panniculitis 





On the day of discharge patient was up ambulatory on the postop Panniculectomy, 

4.52 pounds, ventral hernia repair without mesh due to Adiposis panniculus, 

Central adiposity, panniculitis 








Impression discharge and diagnosed





Status post June 4  Panniculectomy, 4.52 pounds removed, ventral hernia repair 

without mesh 11 x 18 cm


Adiposis panniculus, Central adiposity, panniculitis


Status post Garrett-en-Y gastric bypass 10/31/2016


History of morbid obesity initial BMI 49


Anxiety depressive disorder


Rheumatoid arthritis


Hypothyroid


Achieved 77% excessive weight loss BMI initially 49 down to 30





The above impression and plan of care have been discussed and directed by 

signing physician. Tere Lee nurse practitioner acting as scribe for signing 

physician.








Plan - Discharge Summary


Discharge Rx Participant: Yes


New Discharge Prescriptions: 


New


   Loratadine [Claritin] 10 mg PO HS  tab


   HYDROcodone/APAP 7.5-325MG [Norco 7.5-325] 1 tab PO Q4H PRN 3 Days #40 tab


     PRN Reason: Pain


   Docusate [Colace] 100 mg PO DAILY #20 capsule


   Tamsulosin [Flomax] 0.4 mg PO DAILY #5 cap





Continue


   Levothyroxine Sodium [Synthroid] 150 mcg PO DAILY


   Omeprazole [PriLOSEC] 40 mg PO DAILY


   FLUoxetine HCL [PROzac] 40 mg PO DAILY


   Losartan-Hctz 50-12.5 mg [Hyzaar 50-12.5] 1 tab PO DAILY


   Folic Acid 0.4 mg PO DAILY


   Gabapentin [Neurontin] 300 mg PO DAILY


   Multivitamins, Thera [Multivitamin (formulary)] 1 tab PO DAILY


   Cetirizine HCl [Zyrtec] 10 mg PO HS


   Vit B6 Stress Formula 1 tab PO DAILY


   Vit B12 Dose Unknown 1 tab PO DAILY





Discontinued


   Nystatin 100,000 Unit/gm Powd [Mycostatin Powder] 1 applic TOPICAL BID PRN


     PRN Reason: RED SKIN IN FOLDS


   Methotrexate Sodium [Methotrexate] 17.5 mg PO FR


   Adalimumab [Humira] 10 mg SQ Q14D


Discharge Medication List





Levothyroxine Sodium [Synthroid] 150 mcg PO DAILY 02/27/15 [History]


Omeprazole [PriLOSEC] 40 mg PO DAILY 10/15/16 [History]


FLUoxetine HCL [PROzac] 40 mg PO DAILY 11/07/16 [History]


Losartan-Hctz 50-12.5 mg [Hyzaar 50-12.5] 1 tab PO DAILY 04/12/17 [History]


Folic Acid 0.4 mg PO DAILY 11/15/17 [History]


Gabapentin [Neurontin] 300 mg PO DAILY 04/11/18 [History]


Cetirizine HCl [Zyrtec] 10 mg PO HS 05/22/18 [History]


Multivitamins, Thera [Multivitamin (formulary)] 1 tab PO DAILY 05/22/18 [History

]


Vit B12 Dose Unknown 1 tab PO DAILY 05/22/18 [History]


Vit B6 Stress Formula 1 tab PO DAILY 05/22/18 [History]


HYDROcodone/APAP 7.5-325MG [Norco 7.5-325] 1 tab PO Q4H PRN 3 Days #40 tab 06/06 /18 [Rx]


Loratadine [Claritin] 10 mg PO HS  tab 06/06/18 [Rx]


Docusate [Colace] 100 mg PO DAILY #20 capsule 06/07/18 [Rx]


Tamsulosin [Flomax] 0.4 mg PO DAILY #5 cap 06/07/18 [Rx]








Follow up Appointment(s)/Referral(s): 


Ky Michelle MD [Primary Care Provider] - 06/14/18 10:00 am


[facility] [NON-STAFF] - 06/13/18 2:00 pm


()


Patient Instructions/Handouts:  How to Take a Tub Bath (GEN), Vladislav-Tilley 

Drain Care (DC), Panniculectomy (DC)


Activity/Diet/Wound Care/Special Instructions: 


NO lifting over 4 pounds in 4 weeks. No shower. No bathtub soaks. Record DIEGO 

drain output daily and strip drains to prevent clogging. Sleep with head of bed 

up at 30 to 45 degrees. Walk with hips flexed to prevent tear of your incision. 

Dressings to be removed by your doctor in the office. EAT 75 G PROTEIN DAILY 

FOR OPTIMAL RECOVERY.


Discharge Disposition: HOME SELF-CARE

## 2018-06-15 NOTE — P.OP
Date of Procedure: 06/04/18


Description of Procedure: 





Date of Procedure: 06/04/18





SURGEON:  DIANNA STONE MD





PREOPERATIVE DIAGNOSES:


1.   Panniculitis.


2.   Central adiposity


3.   Adiposis panniculus


4.   Morbid obesity due to excess calories, now resolved. 


5.   Body mass index reduced from 49.7 down to 28.8


6.   Hypothyroidism. 


7.   Depression. 


8.   Status post Garrett-en-Y gastric bypass. 


9.   Rheumatoid arthritis





POSTOPERATIVE DIAGNOSES:


1.   Panniculitis.


2.   Central adiposity


3.   Adiposis panniculus


4.   Morbid obesity due to excess calories, now resolved. 


5.   Body mass index reduced from 49.7 down to 28.8


6.   Hypothyroidism. 


7.   Depression. 


8.   Status post Garrett-en-Y gastric bypass. 


9.   Rheumatoid arthritis


10.    Ventral hernia, reducible, unrelated to previous bariatric procedure





OPERATION:   


1. Panniculectomy, 4.52 pounds.


2. Primary repair of ventral hernia 11 x 18 cm without mesh





Anesthesia: CROW


Assistant #1: Josh Hanna


Estimated Blood Loss (ml): 400


Pathology: other (Panniculectomy)


Condition: stable


Disposition: floor


SPECIMENS REMOVED:  Pannus 4.52 pounds.


COMPLICATIONS:  None.





DRAINS: Two #19 Husam drains below abdominal flap extending through the pubis.





OPERATIVE FINDINGS:  


1. Pannus weighing 4.52 pounds, excised. 


2. Abdominal ventral hernia of 11 x 18 cm along the midline repaired primarily 

using fascial imbrication. 





INDICATIONS: Jess Khoury is a 47-year-old female who is status post Garrett-en-

Y gastric bypass on 10/31/2016. She is over 1.5 years out. She reports troubles 

with her pannus which has been long-standing for over 2 years.  She has been 

using Nystatin powder for over one half years.  She reports limitation of 

movement including exercise as a result of her pannus.  She reports lower back 

pain and painful sores of her abdomen.  Now she is evaluating for 

panniculectomy.  For her height of 5 foot 4 inches, her ideal body weight is 

144 pounds. Today she comes in weighing 167 pounds. Her highest weight was 289 

pounds. She has lost 121 pounds. Body mass index was reduced from 49.7 down to 

28.8. She has achieved 81 % excess weight loss. Benefits and risks of the 

procedure including bleeding, infection, cosmetic deformity, abdominal seromas, 

placement of drains, and risk of flap failure were described at length. 

Informed consent was obtained. 





DESCRIPTION: In the pre-anesthesia care unit the patient was marked with an 

indelible marker. She had also been given heparin subcutaneously. 





The patient was brought into the operating room and laid in supine position. 

After general induction, a Johnson catheter was placed. The abdomen was then 

prepped and draped in standard sterile fashion using ChloraPrep. The skin was 

prepped as far laterally to the back, inferiorly to the upper thighs and 

superiorly to above the bilateral breasts. 





A timeout protocol was confirmed with the surgical team regarding patient's name

, procedure to be performed, including preoperative medications. She had 

received Ancef 2 grams IV antibiotics. 





Once the time-out protocol was confirmed with the surgical team, the patient 

was re-marked with indelible marker whereby the midline of the xiphoid to the 

mons pubis was marked. The anterior/superior iliac spine along the bilateral 

hips was also marked. At 8 cm above the pubis commissure a transverse incision 

was made for the inferior portion of the flap. Using a #10 blade, the incision 

was taken from the midline laterally to above the anterior/superior iliac spine

, initially on the left side of the patient and then on the right side of the 

patient.


Electro-Bovie cautery was used to control for hemostasis. The dissection was 

taken down to the level of the fascia. Landmarks used were the xiphoid process 

as well as the bilateral costal margins for the superior margin. Care was taken 

to avoid any creation of dog ears during the dissection. 





Once hemostasis was checked, a large ventral hernia fascial defect of 11 x 18 

cm was identified unrelated to her bariatric procedure.  During this dissection

, the umbilicus was truncated at its fascial insertion.  The umbilicus fascial 

excision was oversewn using 0-Vicryl.  Starting from the xiphoid process, 

fascial imbrication was performed using #2 Ethibond.  Multiple facial 

imbrications at least 3 layers were performed. The ventral hernia defect was 

completely repaired and closed.  





Hemostasis was once again checked with electro-Bovie cautery and all defects 

were addressed. Attention was now brought to closure of the flap. Using 

stainless steel skin staples, the midline was once again marked of the upper 

flap as well as the pubic commissure. The patient was placed in a flexed 

position of approximately 30 degrees at the hips. The pannus was extended 

inferiorly to the feet. The upper flap was created once the excess skin was 

excised. Again care was taken to avoid any dog ears along the lateral aspect of 

the incisions. Once excised, the pannus was weighed at 4.52 pounds.





The upper and lower flaps were reapproximated at the midline and then laterally 

to the skin with skin staples. Once reapproximated, the skin was closed in 

layers using 0 Vicryl for the superficial fascial system followed by running 3-

0 Monocryl for the deep dermis in a running subcuticular fashion. 





Prior to skin closure, two round #19 Husam drains were placed underneath the 

flap and brought out just inferior to the incision along the pubis. Drain 

stitch using 2-0 nylon was placed. 





Once the incision was closed, bulb suction was attached. Hemostasis was 

checked. 





At the end of the procedure, the needle, sponge and instrument count was 

verified correct.  The skin was cleansed with hydrogen peroxide.  Dermabond 

tape including adhesive was placed along the length of the incision.  Optifoam 

long silver dressing was also placed over the incision.  Optifoam dressing was 

placed over the DIEGO sites.





The patient was then transferred to a hospital bed in a beach chair position. 

An abdominal binder was placed and marked. 





The patient was taken to the postanesthesia care unit in stable condition, 

awake and extubated.

## 2018-06-20 ENCOUNTER — HOSPITAL ENCOUNTER (OUTPATIENT)
Dept: HOSPITAL 47 - BARWHC3 | Age: 48
Discharge: HOME | End: 2018-06-20
Payer: COMMERCIAL

## 2018-06-20 VITALS — BODY MASS INDEX: 29.5 KG/M2

## 2018-06-20 VITALS
TEMPERATURE: 98.3 F | RESPIRATION RATE: 16 BRPM | HEART RATE: 75 BPM | SYSTOLIC BLOOD PRESSURE: 117 MMHG | DIASTOLIC BLOOD PRESSURE: 74 MMHG

## 2018-06-20 DIAGNOSIS — Z98.84: ICD-10-CM

## 2018-06-20 DIAGNOSIS — Z09: Primary | ICD-10-CM

## 2018-06-20 DIAGNOSIS — R63.4: ICD-10-CM

## 2018-06-20 PROCEDURE — 99211 OFF/OP EST MAY X REQ PHY/QHP: CPT

## 2018-06-20 NOTE — P.PN
Subjective


Progress Note Date: 06/20/18








DATE OF SERVICE:  06/20/2018





CHIEF COMPLAINT: Follow-up panniculectomy





HISTORY OF PRESENT ILLNESS:   Jess Khoury is a 47-year-old female who is 

status post Garrett-en-Y gastric bypass on 10/31/2016. She is now 


1.5 years out.  She has lost 117 pounds.  She is status post panniculectomy 06/ 04/2018.  She is now 2 weeks out. She is doing very well. No fevers or chills.  

Her pain is controlled.





For her height of 5 foot 4 inches, her ideal body weight is 144 pounds. Today 

she comes in weighing 172 pounds from 178 pounds, 2 months ago. Her highest 


weight was 289 pounds. She has lost 117 pounds. Body mass index was reduced 

from 49.7 down to 29.6. She has achieved 81% excess weight loss. She has lost 6 

pounds in 2 months.  





PHYSICAL EXAM:


VITAL SIGNS: 172 pounds. Height 5 ft 4 inches. Body mass index of 29.6.


 Vital Signs











Temp  98.3 F   06/20/18 09:02


 


Pulse  75   06/20/18 09:02


 


Resp  16   06/20/18 09:02


 


BP  117/74   06/20/18 09:02


 


Pulse Ox      











  


ABDOMEN: DIEGO are serous. Has purulence milked from drainage site after removal. 

Nontender. No cellulitis of the flap sites.


GENERAL: Well-developed female in no acute distress. 


NEURO: No focal or lateralizing signs. Cranial nerves II through XII grossly 

within normal limits.  


PSYCH: Appropriate affect. Alert and oriented to person, place, and time.  


HEENT: No scleral icterus. Extraocular movements grossly intact. No nasal 

drainage.


NECK: Supple without lymphadenopathy. 


CHEST: Nonlabored respirations.  Equal bilateral excursions.


CARDIOVASCULAR: Regular rate. Regular rhythm.  2+ radial pulses.


MUSCULOSKELETAL: No clubbing, cyanosis, or edema. 


SKIN: Good skin turgor.  Well perfused.





ASSESSMENT:


1. Morbid obesity due to excess calories, now resolved. 


2. Body mass index reduced from 49.7 down to 29.6.


3. Status post Garrett-en-Y gastric bypass. 


4.  Status post panniculectomy from panniculitis





PLAN:


1. All drains removed.


2. Plan for follow-up in 2 to 3 weeks with tape measure of waist.


3. New snug binder is placed.


4. Will need close surveillance from DIEGO drainage sites.

## 2018-07-09 ENCOUNTER — HOSPITAL ENCOUNTER (OUTPATIENT)
Dept: HOSPITAL 47 - BARWHC3 | Age: 48
Discharge: HOME | End: 2018-07-09
Payer: COMMERCIAL

## 2018-07-09 VITALS
DIASTOLIC BLOOD PRESSURE: 81 MMHG | RESPIRATION RATE: 14 BRPM | SYSTOLIC BLOOD PRESSURE: 115 MMHG | HEART RATE: 85 BPM | TEMPERATURE: 98.3 F

## 2018-07-09 VITALS — BODY MASS INDEX: 29.5 KG/M2

## 2018-07-09 DIAGNOSIS — R59.0: ICD-10-CM

## 2018-07-09 DIAGNOSIS — R10.9: Primary | ICD-10-CM

## 2018-07-09 DIAGNOSIS — Z98.84: ICD-10-CM

## 2018-07-09 PROCEDURE — 99212 OFFICE O/P EST SF 10 MIN: CPT

## 2018-07-09 PROCEDURE — 74176 CT ABD & PELVIS W/O CONTRAST: CPT

## 2018-07-09 NOTE — P.PN
Subjective


Progress Note Date: 07/09/18





HPI: Patient reports slipping and falling 8 days ago.  She had immediate felt 

of a tear along the abdomen.  Subsequently, pain grew worse since presents 

today.  No reports of fevers or chills.  She reports moderate abdominal pain.  

Her history is significant for recent panniculectomy.





PLAN:


1.  On exam no palpable seromas.  Incision is well granulated.


2.  Recommend immediate CT of the abdomen and pelvis without contrast to 

evaluate for kidney stones including abdominal wall seromas.

## 2018-07-09 NOTE — CT
EXAMINATION TYPE: CT abdomen pelvis wo con

 

DATE OF EXAM: 7/9/2018

 

COMPARISON: 11/10/2016 and 2/5/2015

 

HISTORY: 47-year-old female Right flank pain bilateral

 

CT DLP: 533.60 mGycm. Automated exposure control for dose reduction was used.

 

TECHNIQUE: Contiguous axial scanning of the abdomen and pelvis without IV contrast. Coronal and sagit
marnie reconstructions performed. 

 

FINDINGS: 

Heart normal size without pericardial effusion. Lung bases clear without pleural effusion.

 

Post surgical changes of Garrett-en-Y gastric bypass and cholecystectomy.

 

Noncontrast appearance of the liver, adrenal glands, kidneys, spleen, and pancreas shows no gross abn
ormal mobility. No nephrolithiasis or hydronephrosis.

 

There is persistent toni mesentery with some borderline sized mesenteric lymph nodes measuring up to
 6 mm.

 

Interval postoperative changes of abdominoplasty with extensive reticulations and fat stranding throu
ghout the subcutaneous fat. Possible small 2.3 cm fluid collection along the superficial fascia right
 paramedian mid to lower abdomen, axial image 62 and sagittal image 60.

 

No dilated small bowel, free fluid, or free air.

 

There is mild scattered stool burden. No pericolonic inflammatory change.

 

Bladder is urine distended. Prominent but nonenlarged bilateral inguinal chain lymph nodes measuring 
up to 1.3 cm likely reactive/post inflammatory. Uterus surgically absent. No abnormal fluid collectio
n in the pelvis or pelvic lymphadenopathy. Tiny pelvic phleboliths.

 

Bones: Degenerative disc disease L5-S1 and mild degenerative changes at the hips. Stable sclerotic valente
[place] posterior right acetabulum.

 

 

 

 

IMPRESSION: 

1.  Interval abdominoplasty with extensive fat stranding throughout the subcutaneous fat of the mid t
o lower anterior abdomen. This could be normal postoperative inflammation or cellulitis. Clinical cor
relation recommended.

2.  A small 2.3 cm nonspecific fluid collection at the lower abdominal midline along the superficial 
fascia, postoperative seroma, hematoma, and abscess are all in the differential.

3.  Persistent toni mesentery with borderline size mesenteric lymph nodes. This finding has been pre
sent dating back to 2/5/2015 and suggests a benign etiology such as mesenteric panniculitis.

4.  Status post Garrett-en-Y gastric bypass.

## 2018-07-11 ENCOUNTER — HOSPITAL ENCOUNTER (OUTPATIENT)
Dept: HOSPITAL 47 - BARWHC3 | Age: 48
Discharge: HOME | End: 2018-07-11
Payer: COMMERCIAL

## 2018-07-11 VITALS
RESPIRATION RATE: 16 BRPM | SYSTOLIC BLOOD PRESSURE: 121 MMHG | DIASTOLIC BLOOD PRESSURE: 77 MMHG | HEART RATE: 86 BPM | TEMPERATURE: 98.4 F

## 2018-07-11 VITALS — BODY MASS INDEX: 29.5 KG/M2

## 2018-07-11 DIAGNOSIS — R10.2: Primary | ICD-10-CM

## 2018-07-11 PROCEDURE — 99211 OFF/OP EST MAY X REQ PHY/QHP: CPT

## 2018-07-11 NOTE — P.PN
Subjective


Progress Note Date: 07/11/18





HPI: She presents with pain along the pubis. She had a fall and had new pain as 

a result. She presents for further evaluation.





PLAN:


1. Pain consent reviewed.


2. Will follow up with phone call for improvement.

## 2018-11-01 ENCOUNTER — HOSPITAL ENCOUNTER (EMERGENCY)
Dept: HOSPITAL 47 - EC | Age: 48
Discharge: HOME | End: 2018-11-01
Payer: COMMERCIAL

## 2018-11-01 VITALS
HEART RATE: 71 BPM | SYSTOLIC BLOOD PRESSURE: 136 MMHG | DIASTOLIC BLOOD PRESSURE: 81 MMHG | RESPIRATION RATE: 13 BRPM | TEMPERATURE: 99.1 F

## 2018-11-01 DIAGNOSIS — F32.9: ICD-10-CM

## 2018-11-01 DIAGNOSIS — R10.13: ICD-10-CM

## 2018-11-01 DIAGNOSIS — Z98.51: ICD-10-CM

## 2018-11-01 DIAGNOSIS — G47.30: ICD-10-CM

## 2018-11-01 DIAGNOSIS — K21.9: ICD-10-CM

## 2018-11-01 DIAGNOSIS — Z98.84: ICD-10-CM

## 2018-11-01 DIAGNOSIS — Z99.89: ICD-10-CM

## 2018-11-01 DIAGNOSIS — Z90.49: ICD-10-CM

## 2018-11-01 DIAGNOSIS — I10: ICD-10-CM

## 2018-11-01 DIAGNOSIS — Z79.899: ICD-10-CM

## 2018-11-01 DIAGNOSIS — L08.9: Primary | ICD-10-CM

## 2018-11-01 DIAGNOSIS — E07.9: ICD-10-CM

## 2018-11-01 DIAGNOSIS — M79.7: ICD-10-CM

## 2018-11-01 DIAGNOSIS — F41.9: ICD-10-CM

## 2018-11-01 DIAGNOSIS — Z95.818: ICD-10-CM

## 2018-11-01 DIAGNOSIS — Z90.710: ICD-10-CM

## 2018-11-01 LAB
ALBUMIN SERPL-MCNC: 4 G/DL (ref 3.5–5)
ALP SERPL-CCNC: 47 U/L (ref 38–126)
ALT SERPL-CCNC: 26 U/L (ref 9–52)
AMYLASE SERPL-CCNC: 48 U/L (ref 30–110)
ANION GAP SERPL CALC-SCNC: 9 MMOL/L
AST SERPL-CCNC: 32 U/L (ref 14–36)
BASOPHILS # BLD AUTO: 0.1 K/UL (ref 0–0.2)
BASOPHILS NFR BLD AUTO: 1 %
BUN SERPL-SCNC: 25 MG/DL (ref 7–17)
CALCIUM SPEC-MCNC: 9.8 MG/DL (ref 8.4–10.2)
CHLORIDE SERPL-SCNC: 105 MMOL/L (ref 98–107)
CO2 SERPL-SCNC: 24 MMOL/L (ref 22–30)
EOSINOPHIL # BLD AUTO: 0.3 K/UL (ref 0–0.7)
EOSINOPHIL NFR BLD AUTO: 3 %
ERYTHROCYTE [DISTWIDTH] IN BLOOD BY AUTOMATED COUNT: 4.3 M/UL (ref 3.8–5.4)
ERYTHROCYTE [DISTWIDTH] IN BLOOD: 15.3 % (ref 11.5–15.5)
GLUCOSE SERPL-MCNC: 162 MG/DL (ref 74–99)
HCT VFR BLD AUTO: 36.9 % (ref 34–46)
HGB BLD-MCNC: 12 GM/DL (ref 11.4–16)
LIPASE SERPL-CCNC: 108 U/L (ref 23–300)
LYMPHOCYTES # SPEC AUTO: 2.1 K/UL (ref 1–4.8)
LYMPHOCYTES NFR SPEC AUTO: 29 %
MCH RBC QN AUTO: 27.9 PG (ref 25–35)
MCHC RBC AUTO-ENTMCNC: 32.5 G/DL (ref 31–37)
MCV RBC AUTO: 85.7 FL (ref 80–100)
MONOCYTES # BLD AUTO: 0.4 K/UL (ref 0–1)
MONOCYTES NFR BLD AUTO: 6 %
NEUTROPHILS # BLD AUTO: 4.3 K/UL (ref 1.3–7.7)
NEUTROPHILS NFR BLD AUTO: 59 %
PH UR: 5 [PH] (ref 5–8)
PLATELET # BLD AUTO: 336 K/UL (ref 150–450)
POTASSIUM SERPL-SCNC: 4 MMOL/L (ref 3.5–5.1)
PROT SERPL-MCNC: 7.2 G/DL (ref 6.3–8.2)
SODIUM SERPL-SCNC: 138 MMOL/L (ref 137–145)
SP GR UR: 1.01 (ref 1–1.03)
UROBILINOGEN UR QL STRIP: <2 MG/DL (ref ?–2)
WBC # BLD AUTO: 7.2 K/UL (ref 3.8–10.6)

## 2018-11-01 PROCEDURE — 87040 BLOOD CULTURE FOR BACTERIA: CPT

## 2018-11-01 PROCEDURE — 87205 SMEAR GRAM STAIN: CPT

## 2018-11-01 PROCEDURE — 81003 URINALYSIS AUTO W/O SCOPE: CPT

## 2018-11-01 PROCEDURE — 87086 URINE CULTURE/COLONY COUNT: CPT

## 2018-11-01 PROCEDURE — 87077 CULTURE AEROBIC IDENTIFY: CPT

## 2018-11-01 PROCEDURE — 82150 ASSAY OF AMYLASE: CPT

## 2018-11-01 PROCEDURE — 80053 COMPREHEN METABOLIC PANEL: CPT

## 2018-11-01 PROCEDURE — 87070 CULTURE OTHR SPECIMN AEROBIC: CPT

## 2018-11-01 PROCEDURE — 83690 ASSAY OF LIPASE: CPT

## 2018-11-01 PROCEDURE — 87186 SC STD MICRODIL/AGAR DIL: CPT

## 2018-11-01 PROCEDURE — 74177 CT ABD & PELVIS W/CONTRAST: CPT

## 2018-11-01 PROCEDURE — 93005 ELECTROCARDIOGRAM TRACING: CPT

## 2018-11-01 PROCEDURE — 99284 EMERGENCY DEPT VISIT MOD MDM: CPT

## 2018-11-01 PROCEDURE — 85025 COMPLETE CBC W/AUTO DIFF WBC: CPT

## 2018-11-01 PROCEDURE — 36415 COLL VENOUS BLD VENIPUNCTURE: CPT

## 2018-11-01 PROCEDURE — 83605 ASSAY OF LACTIC ACID: CPT

## 2018-11-01 RX ADMIN — NICARDIPINE HYDROCHLORIDE SCH MLS/HR: 2.5 INJECTION INTRAVENOUS at 15:58

## 2018-11-01 NOTE — CT
EXAMINATION TYPE: CT abdomen pelvis w con

 

DATE OF EXAM: 11/1/2018

 

COMPARISON: 7/9/2018

 

HISTORY: Umbilical pain and swelling with open wound for 2 weeks. Post OP bariatric surgery in June 2
018

 

CT DLP: 850.8 mGycm

Automated exposure control for dose reduction was used.

 

TECHNIQUE:  Helical acquisition of images was performed from the lung bases through the pelvis.

 

CONTRAST: 

Performed without Oral Contrast and with IV Contrast, patient injected with 100 mL of Isovue 300.

 

FINDINGS: 

 

Lung bases are clear of consolidation. There are clips from bariatric surgery. There is no pleural ef
fusion. Heart size is normal. There is no pericardial effusion. Liver shows no focal defect. There is
 no evidence of pancreatic mass. Spleen appears normal.

 

There is no adrenal mass. Kidneys show satisfactory contrast opacification. There is no hydronephrosi
s. There is no retroperitoneal adenopathy. There is fat stranding in the small bowel mesentery. There
 is some thickening of the anterior abdominal wall with subcutaneous edema. There is no discrete flui
d collection. Bladder distends smoothly. I see no pelvic mass. There is apparent hysterectomy. Lumbar
 spine is intact. Bony pelvis appears intact. There is no sign of free air. There is narrowing at L5-
S1 disc space with spur formation. There is no ascites. There is no evidence of inguinal hernia.

 

There are no dilated bowel loops. There is no evidence of a bowel obstruction. Appendix is not defini
tely seen. There is no sign of appendicitis.

IMPRESSION: 

THERE IS SOFT TISSUE SWELLING AND SUBCUTANEOUS MASS ALONG THE ANTERIOR ABDOMINAL WALL THAT EXTENDS TO
 THE SKIN SURFACE CONSISTENT WITH ULCERATION AND PHLEGMON. I DO NOT SEE DEFINITE HERNIATED BOWEL. THI
S IS CONSISTENT WITH INFLAMMATORY REACTION AND PHLEGMON. NO BOWEL OBSTRUCTION. THERE IS SMALL BOWEL M
ESENTERIC MILD EDEMA UNCHANGED. THERE IS SLIGHT DECREASE IN THE INFLAMMATORY CHANGES ON THE ANTERIOR 
ABDOMINAL WALL COMPARED TO OLD EXAM. NO DRAINABLE FLUID COLLECTION.

## 2018-11-01 NOTE — ED
General Adult HPI





- General


Chief complaint: Skin/Abscess/Foreign Body


Stated complaint: incision draining


Source: patient, RN notes reviewed, old records reviewed


Mode of arrival: ambulatory


Limitations: no limitations





- History of Present Illness


Initial comments: 


48-year-old female patient presents to ED with painful drainage in her mons 

pubis.  Patient has a past history including hypertension, diabetes type 2, 

bariatric surgery.  Approximate 4 months ago patient had at that abdominoplasty

, moving approximately 5 lbs of skin.  Patient was doing well until 

approximately 2 weeks ago when patient developed a "mass on her mons pubis" in 

the location of a previous drainage port site.  Patient's incised this and 

reportedly encountered a large amount of purulent drainage. The site of this 

mass has contined to drain on a daily basis. Additionally pt complains of a 

"spider bite" on her medial left ankle which began approximately 1 month ago. 

Pt states that it is nearly healed and much improved than previously. Pt denies 

drainage/exudate. Pt states that it is " Over the last 2 weeks the pt has also 

experienced sx of fatigue, fever/chills, malaise and 2 days of diarrhea. 








- Related Data


 Home Medications











 Medication  Instructions  Recorded  Confirmed


 


Levothyroxine Sodium [Synthroid] 150 mcg PO DAILY 02/27/15 11/01/18


 


Omeprazole [PriLOSEC] 40 mg PO DAILY 10/15/16 11/01/18


 


FLUoxetine HCL [PROzac] 40 mg PO DAILY 16


 


Losartan-Hctz 50-12.5 mg [Hyzaar 1 tab PO DAILY 17





50-12.5]   


 


Folic Acid 0.4 mg PO DAILY 11/15/17 11/01/18


 


Gabapentin [Neurontin] 300 mg PO TID 18


 


Cetirizine HCl [Zyrtec] 10 mg PO HS 18


 


Multivitamins, Thera [Multivitamin 1 tab PO DAILY 18





(formulary)]   


 


ALPRAZolam [Xanax] 0.25 mg PO HS 18


 


Vitamin B Complex 1 cap PO DAILY 18








 Previous Rx's











 Medication  Instructions  Recorded


 


Cephalexin [Keflex] 500 mg PO Q12HR 10 Days #20 cap 18


 


Sulfamethox-Tmp 800-160Mg [Bactrim 2 tab PO Q12HR #40 tab 18





-160 mg]  











 Allergies











Allergy/AdvReac Type Severity Reaction Status Date / Time


 


No Known Allergies Allergy   Verified 18 16:13














Review of Systems


ROS Statement: 


Those systems with pertinent positive or pertinent negative responses have been 

documented in the HPI.





ROS Other: All systems not noted in ROS Statement are negative.





Past Medical History


Past Medical History: Asthma, Chest Pain / Angina, Diabetes Mellitus, 

Fibromyalgia, GERD/Reflux, Hyperlipidemia, Hypertension, Osteoarthritis (OA), 

Sleep Apnea/CPAP/BIPAP, Thyroid Disorder


Additional Past Medical History / Comment(s): USES C-PAP MACHINE, DDD WITH BACK 

PAIN, FATTY LIVER. ,TORN LT ROTATOR CUFF 2016


History of Any Multi-Drug Resistant Organisms: None Reported


Past Surgical History: Appendectomy, Bariatric Surgery, Bladder Surgery, 

Cholecystectomy, Heart Catheterization, Hysterectomy, Tubal Ligation


Additional Past Surgical History / Comment(s): partial thyroidectomy, bladder 

suspension w/mesh (as of 18 patient has since undergone sx 4x to have 

various portions of mesh removed), sinus repair surgery, bilateral knee surgery 

(meniscus, femur fracture on rt) 10-31-16 MILO EN Y GASTRIC BYPASS, EGD, 

COLONOSCOPY, panniculectomy 18


Past Anesthesia/Blood Transfusion Reactions: Family History of Problems w/ 

Anesthesia, Motion Sickness


Additional Past Anesthesia/Blood Transfusion Reaction / Comment(s): Pt has 

difficulty waking up.  Very difficult intubation.  Pts grandmother has 

difficulty waking and also difficult intubation.


Past Psychological History: Anxiety, Depression


Smoking Status: Never smoker


Past Alcohol Use History: Rare


Past Drug Use History: None Reported





- Past Family History


  ** Mother


Family Medical History: Cancer


Additional Family Medical History / Comment(s): Breast CA





  ** Father


Family Medical History: Cancer, CVA/TIA


Additional Family Medical History / Comment(s): - stroke





General Exam





- General Exam Comments


Initial Comments: 





Constitutional: NAD, AOX3, Pt has pleasant affect. 


HEENT: NC/AT, trachea midline, neck supple, no lymphadenopathy. Posterior 

pharynx non erythematous, without exudates. External ears appear normal, 

without discharge. Mucous membranes moist. Eyes PERRLA, EOM intact. There is no 

scleral icterus. No pallor noted. 


Cardiopulmonary: RRR, no murmurs, rubs or gallops, no JVD noted. Lungs CTAB in 

anterior and posterior fields. No peripheral edema. 


Abdominal exam: Abdomen soft and non-distended. Abdomen very mildly tender on 

left flank and epigastric region. [place] sign negative, no tenderness at 

mcburneys point. No ecchymosis, no cullens/grey turners sign. Bowel sounds 

active in LLQ. No hepatosplenomegaly.  


Neuro: CN II-XII grossly intact. 


Gyn: Drainage noted on midline mons pubis, site of former drain port. Area is 

non tender and non flucturant. Non erythematous. Clear serous drainage 

expressed. No streaking. Chaperoned by phlebotomist linda gtz.


MSK: Small 2x2mm ulceration on medial L ankle. very mild local erythema 

surrounding, non tender, no exudate. Appears to be nearly healed. 





Limitations: no limitations





Course





 Vital Signs











  18





  15:06


 


Temperature 99.2 F


 


Pulse Rate 105 H


 


Respiratory 18





Rate 


 


Blood Pressure 145/86


 


O2 Sat by Pulse 99





Oximetry 














Medical Decision Making





- Medical Decision Making


48-year-old female patient past medical history including bariatric surgery, 

and abdominoplasty in July.  Patient reports drainage from a previous drainage 

port sites that has been ongoing for approximately 2 weeks.  Patient has 

additional complaints of fever chills, malaise over the same timeframe. Pt had 

mild epigastric and flank abdominal pain on physical exam, otherwise benign. 

Extensive investigations into intra-abdominal pathology were conducted. These 

included CBC, CMP, UA, lipase/amylase, lactic acid - all of which were wnl. An 

EKG was conducted that was NSR with no concerns for ischemia. Additionally an 

abdominal/pelvic CT was conducted which displayed inflammation that was 

relatively unchanged from a previous CT approx 4 months ago, no other overt 

signs of acute abdominal pathology. A discussion between Dr. Noe and the 

pts bariatric surgeon Dr. Jane Pappas took place in which the shared 

decision was to tx the pt outpt with antibiotics and have her follow up with 

PCP and Dr. Pappas. The pt had an additional complaint of an ulceration on 

the L medial heel. This appears to be nearly healed, and pt states that it is 

much improved from it was previously. The abx the pt is prescribed would cover 

this for superficial skin infection. Pt to follow up with PCP and Dr. Pappas 

in 1-2 days. Pt to return to ED if symptoms worsen or new complaints develop. 

This includes abdominal pain, fever/chills, n/v/d, or any other new symptoms. 

HR 64 on discharge at 18:24.  








- EKG Data


EKG Comments: 





NSR. Ventricular rate 74. MO interval 138. QRS 80. , QTc 428. 





Disposition


Clinical Impression: 


 Superficial skin infection





Disposition: HOME SELF-CARE


Condition: Good


Instructions:  Cellulitis (ED)


Prescriptions: 


Cephalexin [Keflex] 500 mg PO Q12HR 10 Days #20 cap


Sulfamethox-Tmp 800-160Mg [Bactrim -160 mg] 2 tab PO Q12HR #40 tab


Is patient prescribed a controlled substance at d/c from ED?: No


Referrals: 


Ky Michelle MD [Primary Care Provider] - 1-2 days


Jane Mercer MD [STAFF PHYSICIAN] - 1-2 days


Time of Disposition: 18:19

## 2019-06-08 ENCOUNTER — HOSPITAL ENCOUNTER (INPATIENT)
Dept: HOSPITAL 47 - EC | Age: 49
LOS: 3 days | Discharge: HOME | DRG: 378 | End: 2019-06-11
Attending: HOSPITALIST | Admitting: HOSPITALIST
Payer: COMMERCIAL

## 2019-06-08 DIAGNOSIS — Z87.81: ICD-10-CM

## 2019-06-08 DIAGNOSIS — D50.0: ICD-10-CM

## 2019-06-08 DIAGNOSIS — M79.7: ICD-10-CM

## 2019-06-08 DIAGNOSIS — F32.9: ICD-10-CM

## 2019-06-08 DIAGNOSIS — G47.33: ICD-10-CM

## 2019-06-08 DIAGNOSIS — J45.909: ICD-10-CM

## 2019-06-08 DIAGNOSIS — Z88.0: ICD-10-CM

## 2019-06-08 DIAGNOSIS — Z82.3: ICD-10-CM

## 2019-06-08 DIAGNOSIS — F41.9: ICD-10-CM

## 2019-06-08 DIAGNOSIS — E11.9: ICD-10-CM

## 2019-06-08 DIAGNOSIS — Z98.51: ICD-10-CM

## 2019-06-08 DIAGNOSIS — M19.90: ICD-10-CM

## 2019-06-08 DIAGNOSIS — Z80.3: ICD-10-CM

## 2019-06-08 DIAGNOSIS — E89.0: ICD-10-CM

## 2019-06-08 DIAGNOSIS — Z79.899: ICD-10-CM

## 2019-06-08 DIAGNOSIS — E78.5: ICD-10-CM

## 2019-06-08 DIAGNOSIS — K76.0: ICD-10-CM

## 2019-06-08 DIAGNOSIS — M54.9: ICD-10-CM

## 2019-06-08 DIAGNOSIS — K21.9: ICD-10-CM

## 2019-06-08 DIAGNOSIS — Z98.890: ICD-10-CM

## 2019-06-08 DIAGNOSIS — Z79.51: ICD-10-CM

## 2019-06-08 DIAGNOSIS — D62: ICD-10-CM

## 2019-06-08 DIAGNOSIS — K28.4: Primary | ICD-10-CM

## 2019-06-08 DIAGNOSIS — Z90.49: ICD-10-CM

## 2019-06-08 DIAGNOSIS — I10: ICD-10-CM

## 2019-06-08 DIAGNOSIS — Z79.890: ICD-10-CM

## 2019-06-08 DIAGNOSIS — Z90.711: ICD-10-CM

## 2019-06-08 DIAGNOSIS — Z98.84: ICD-10-CM

## 2019-06-08 DIAGNOSIS — Z99.89: ICD-10-CM

## 2019-06-08 LAB
BASOPHILS # BLD AUTO: 0 K/UL (ref 0–0.2)
BASOPHILS NFR BLD AUTO: 1 %
EOSINOPHIL # BLD AUTO: 0.1 K/UL (ref 0–0.7)
EOSINOPHIL NFR BLD AUTO: 1 %
ERYTHROCYTE [DISTWIDTH] IN BLOOD BY AUTOMATED COUNT: 3.2 M/UL (ref 3.8–5.4)
ERYTHROCYTE [DISTWIDTH] IN BLOOD: 14.5 % (ref 11.5–15.5)
HCT VFR BLD AUTO: 26 % (ref 34–46)
HGB BLD-MCNC: 8.3 GM/DL (ref 11.4–16)
LYMPHOCYTES # SPEC AUTO: 2.3 K/UL (ref 1–4.8)
LYMPHOCYTES NFR SPEC AUTO: 34 %
MCH RBC QN AUTO: 26 PG (ref 25–35)
MCHC RBC AUTO-ENTMCNC: 32.1 G/DL (ref 31–37)
MCV RBC AUTO: 81.1 FL (ref 80–100)
MONOCYTES # BLD AUTO: 0.4 K/UL (ref 0–1)
MONOCYTES NFR BLD AUTO: 6 %
NEUTROPHILS # BLD AUTO: 3.9 K/UL (ref 1.3–7.7)
NEUTROPHILS NFR BLD AUTO: 57 %
PLATELET # BLD AUTO: 321 K/UL (ref 150–450)
WBC # BLD AUTO: 6.9 K/UL (ref 3.8–10.6)

## 2019-06-08 PROCEDURE — 96374 THER/PROPH/DIAG INJ IV PUSH: CPT

## 2019-06-08 PROCEDURE — 43239 EGD BIOPSY SINGLE/MULTIPLE: CPT

## 2019-06-08 PROCEDURE — 86920 COMPATIBILITY TEST SPIN: CPT

## 2019-06-08 PROCEDURE — 99285 EMERGENCY DEPT VISIT HI MDM: CPT

## 2019-06-08 PROCEDURE — 80048 BASIC METABOLIC PNL TOTAL CA: CPT

## 2019-06-08 PROCEDURE — 94640 AIRWAY INHALATION TREATMENT: CPT

## 2019-06-08 PROCEDURE — 85025 COMPLETE CBC W/AUTO DIFF WBC: CPT

## 2019-06-08 PROCEDURE — 86901 BLOOD TYPING SEROLOGIC RH(D): CPT

## 2019-06-08 PROCEDURE — 36415 COLL VENOUS BLD VENIPUNCTURE: CPT

## 2019-06-08 PROCEDURE — 86850 RBC ANTIBODY SCREEN: CPT

## 2019-06-08 PROCEDURE — 81003 URINALYSIS AUTO W/O SCOPE: CPT

## 2019-06-08 PROCEDURE — 88305 TISSUE EXAM BY PATHOLOGIST: CPT

## 2019-06-08 PROCEDURE — 86900 BLOOD TYPING SEROLOGIC ABO: CPT

## 2019-06-08 PROCEDURE — 71045 X-RAY EXAM CHEST 1 VIEW: CPT

## 2019-06-08 RX ADMIN — CEFAZOLIN SCH MLS/HR: 330 INJECTION, POWDER, FOR SOLUTION INTRAMUSCULAR; INTRAVENOUS at 21:21

## 2019-06-08 RX ADMIN — PANTOPRAZOLE SODIUM SCH MG: 40 INJECTION, POWDER, FOR SOLUTION INTRAVENOUS at 21:21

## 2019-06-08 NOTE — ED
GI Bleed HPI





- General


Chief complaint: GI Bleed


Stated complaint: Abdominal Pain


Time Seen by Provider: 19 19:56


Source: patient, EMS, RN notes reviewed, old records reviewed


Mode of arrival: EMS





- History of Present Illness


Initial comments: 





This a 48-year-old female who was transferred from [facility] to this

facility for evaluation of GI bleeding.  Patient states she has had a burgundy 

in dark red colored stools this started last evening around 10 PM.  She also 

admits to black.  She does admit she took some Pepto-Bismol.  She was found have

a hemoglobin 9.1 at the transferring facility.  She also states that she had 3 

episodes where she passed out last evening doing feeling lightheaded.  She had 

no chest pain or palpitations.  No vomiting.  She does states she had a 

bariatric procedure done about 3 years ago.  She also states she feels much 

improved after IV fluids that were given to her.  No complaints of modifying 

factors.


MD complaint: melena, blood streaked stool, gross hematochezia, other





- Related Data


                                Home Medications











 Medication  Instructions  Recorded  Confirmed


 


Levothyroxine Sodium [Synthroid] 150 mcg PO DAILY 02/27/15 06/08/19


 


Losartan-Hctz 50-12.5 mg [Hyzaar 1 tab PO DAILY 17





50-12.5]   


 


Cetirizine HCl [Zyrtec] 10 mg PO DAILY 18


 


Albuterol Inhaler [Ventolin Hfa 1 - 2 puff INHALATION RT-Q6H PRN 19





Inhaler]   


 


Fluticasone/Salmeterol [Advair 1 inhalation PO RT-Q12H 19





100-50 Diskus]   


 


Omeprazole 20 mg PO DAILY 19


 


Sertraline HCl [Zoloft] 100 mg PO DAILY 19











                                    Allergies











Allergy/AdvReac Type Severity Reaction Status Date / Time


 


Penicillins Allergy  Unknown Verified 19 20:43














Review of Systems


ROS Statement: 


Those systems with pertinent positive or pertinent negative responses have been 

documented in the HPI.





ROS Other: All systems not noted in ROS Statement are negative.





Past Medical History


Past Medical History: Asthma, Chest Pain / Angina, Diabetes Mellitus, 

Fibromyalgia, GERD/Reflux, Hyperlipidemia, Hypertension, Osteoarthritis (OA), 

Sleep Apnea/CPAP/BIPAP, Thyroid Disorder


Additional Past Medical History / Comment(s): USES C-PAP MACHINE, DDD WITH BACK 

PAIN, FATTY LIVER. ,TORN LT ROTATOR CUFF 2016


History of Any Multi-Drug Resistant Organisms: None Reported


Past Surgical History: Appendectomy, Bariatric Surgery, Bladder Surgery, 

Cholecystectomy, Heart Catheterization, Hysterectomy, Tubal Ligation


Additional Past Surgical History / Comment(s): partial thyroidectomy, bladder 

suspension w/mesh (as of 18 patient has since undergone sx 4x to have 

various portions of mesh removed), sinus repair surgery, bilateral knee surgery 

(meniscus, femur fracture on rt) 10-31-16 MILO EN Y GASTRIC BYPASS, EGD, 

COLONOSCOPY, panniculectomy 18


Past Anesthesia/Blood Transfusion Reactions: Family History of Problems w/ 

Anesthesia, Motion Sickness


Additional Past Anesthesia/Blood Transfusion Reaction / Comment(s): Pt has 

difficulty waking up.  Very difficult intubation.  Pts grandmother has 

difficulty waking and also difficult intubation.


Past Psychological History: Anxiety, Depression


Smoking Status: Never smoker


Past Alcohol Use History: Rare


Past Drug Use History: None Reported





- Past Family History


  ** Mother


Family Medical History: Cancer


Additional Family Medical History / Comment(s): Breast CA





  ** Father


Family Medical History: Cancer, CVA/TIA


Additional Family Medical History / Comment(s): - stroke





General Exam





- General Exam Comments


Initial Comments: 





This is a well-developed well-nourished awake alert oriented 3 female


General appearance: alert, in no apparent distress


Head exam: Present: atraumatic, normocephalic, normal inspection


Eye exam: Present: normal appearance, PERRL, EOMI.  Absent: scleral icterus, 

conjunctival injection, periorbital swelling


ENT exam: Present: normal exam, mucous membranes moist


Neck exam: Present: normal inspection.  Absent: tenderness, meningismus, 

lymphadenopathy


Respiratory exam: Present: normal lung sounds bilaterally.  Absent: respiratory 

distress, wheezes, rales, rhonchi, stridor


Cardiovascular Exam: Present: regular rate, normal rhythm, normal heart sounds. 

 Absent: systolic murmur, diastolic murmur, rubs, gallop, clicks


GI/Abdominal exam: Present: soft, normal bowel sounds.  Absent: distended, 

tenderness, guarding, rebound, rigid


Rectal exam: Present: deferred


Extremities exam: Present: normal inspection, full ROM, normal capillary refill.

  Absent: tenderness, pedal edema, joint swelling, calf tenderness


Back exam: Present: normal inspection


Neurological exam: Present: alert, oriented X3, CN II-XII intact


Psychiatric exam: Present: normal affect, normal mood


Skin exam: Present: warm, dry, intact, normal color.  Absent: rash





Course





                                   Vital Signs











  19





  20:01


 


Temperature 99.5 F


 


Pulse Rate 97


 


Respiratory 16





Rate 


 


Blood Pressure 121/77


 


O2 Sat by Pulse 99





Oximetry 














Medical Decision Making





- Medical Decision Making





Did review the materials submitted the transferring facility.  I did discuss the

 case with Dr. Forte.  Patient will be admitted with GI consultation.  Patient 

also has seen Dr. Pappas the past but has requested not to be consult at on 

by her.  Reason is unknown.  Dr. Kaminski will be consulted in the a.m.





Disposition


Clinical Impression: 


 Hematochezia, Lower GI bleed, Anemia





Disposition: ADMITTED AS IP TO THIS HOSP


Condition: Fair


Referrals: 


Ky Michelle MD [Primary Care Provider] - 1-2 days

## 2019-06-09 LAB
ANION GAP SERPL CALC-SCNC: 3 MMOL/L
BASOPHILS # BLD AUTO: 0 K/UL (ref 0–0.2)
BASOPHILS # BLD AUTO: 0.1 K/UL (ref 0–0.2)
BASOPHILS NFR BLD AUTO: 0 %
BASOPHILS NFR BLD AUTO: 1 %
BUN SERPL-SCNC: 19 MG/DL (ref 7–17)
CALCIUM SPEC-MCNC: 8.7 MG/DL (ref 8.4–10.2)
CHLORIDE SERPL-SCNC: 109 MMOL/L (ref 98–107)
CO2 SERPL-SCNC: 28 MMOL/L (ref 22–30)
EOSINOPHIL # BLD AUTO: 0.1 K/UL (ref 0–0.7)
EOSINOPHIL # BLD AUTO: 0.2 K/UL (ref 0–0.7)
EOSINOPHIL NFR BLD AUTO: 2 %
EOSINOPHIL NFR BLD AUTO: 3 %
ERYTHROCYTE [DISTWIDTH] IN BLOOD BY AUTOMATED COUNT: 2.72 M/UL (ref 3.8–5.4)
ERYTHROCYTE [DISTWIDTH] IN BLOOD BY AUTOMATED COUNT: 2.72 M/UL (ref 3.8–5.4)
ERYTHROCYTE [DISTWIDTH] IN BLOOD BY AUTOMATED COUNT: 2.78 M/UL (ref 3.8–5.4)
ERYTHROCYTE [DISTWIDTH] IN BLOOD BY AUTOMATED COUNT: 2.81 M/UL (ref 3.8–5.4)
ERYTHROCYTE [DISTWIDTH] IN BLOOD: 14.4 % (ref 11.5–15.5)
ERYTHROCYTE [DISTWIDTH] IN BLOOD: 14.4 % (ref 11.5–15.5)
ERYTHROCYTE [DISTWIDTH] IN BLOOD: 14.6 % (ref 11.5–15.5)
ERYTHROCYTE [DISTWIDTH] IN BLOOD: 14.6 % (ref 11.5–15.5)
GLUCOSE SERPL-MCNC: 90 MG/DL (ref 74–99)
HCT VFR BLD AUTO: 22.5 % (ref 34–46)
HCT VFR BLD AUTO: 22.6 % (ref 34–46)
HCT VFR BLD AUTO: 22.9 % (ref 34–46)
HCT VFR BLD AUTO: 23.1 % (ref 34–46)
HGB BLD-MCNC: 7 GM/DL (ref 11.4–16)
HGB BLD-MCNC: 7.3 GM/DL (ref 11.4–16)
HGB BLD-MCNC: 7.4 GM/DL (ref 11.4–16)
HGB BLD-MCNC: 7.4 GM/DL (ref 11.4–16)
KETONES UR QL STRIP.AUTO: (no result)
LYMPHOCYTES # SPEC AUTO: 1.4 K/UL (ref 1–4.8)
LYMPHOCYTES # SPEC AUTO: 1.9 K/UL (ref 1–4.8)
LYMPHOCYTES # SPEC AUTO: 2 K/UL (ref 1–4.8)
LYMPHOCYTES # SPEC AUTO: 2 K/UL (ref 1–4.8)
LYMPHOCYTES NFR SPEC AUTO: 31 %
LYMPHOCYTES NFR SPEC AUTO: 37 %
LYMPHOCYTES NFR SPEC AUTO: 41 %
LYMPHOCYTES NFR SPEC AUTO: 41 %
MCH RBC QN AUTO: 25.8 PG (ref 25–35)
MCH RBC QN AUTO: 26.2 PG (ref 25–35)
MCH RBC QN AUTO: 26.6 PG (ref 25–35)
MCH RBC QN AUTO: 27.3 PG (ref 25–35)
MCHC RBC AUTO-ENTMCNC: 31.3 G/DL (ref 31–37)
MCHC RBC AUTO-ENTMCNC: 32.1 G/DL (ref 31–37)
MCHC RBC AUTO-ENTMCNC: 32.1 G/DL (ref 31–37)
MCHC RBC AUTO-ENTMCNC: 32.9 G/DL (ref 31–37)
MCV RBC AUTO: 81.6 FL (ref 80–100)
MCV RBC AUTO: 82.5 FL (ref 80–100)
MCV RBC AUTO: 82.8 FL (ref 80–100)
MCV RBC AUTO: 83 FL (ref 80–100)
MONOCYTES # BLD AUTO: 0.3 K/UL (ref 0–1)
MONOCYTES NFR BLD AUTO: 6 %
NEUTROPHILS # BLD AUTO: 2.2 K/UL (ref 1.3–7.7)
NEUTROPHILS # BLD AUTO: 2.3 K/UL (ref 1.3–7.7)
NEUTROPHILS # BLD AUTO: 2.5 K/UL (ref 1.3–7.7)
NEUTROPHILS # BLD AUTO: 2.8 K/UL (ref 1.3–7.7)
NEUTROPHILS NFR BLD AUTO: 47 %
NEUTROPHILS NFR BLD AUTO: 48 %
NEUTROPHILS NFR BLD AUTO: 51 %
NEUTROPHILS NFR BLD AUTO: 57 %
PH UR: 5.5 [PH] (ref 5–8)
PLATELET # BLD AUTO: 213 K/UL (ref 150–450)
PLATELET # BLD AUTO: 227 K/UL (ref 150–450)
PLATELET # BLD AUTO: 232 K/UL (ref 150–450)
PLATELET # BLD AUTO: 238 K/UL (ref 150–450)
POTASSIUM SERPL-SCNC: 4.1 MMOL/L (ref 3.5–5.1)
SODIUM SERPL-SCNC: 140 MMOL/L (ref 137–145)
SP GR UR: 1.04 (ref 1–1.03)
UROBILINOGEN UR QL STRIP: <2 MG/DL (ref ?–2)
WBC # BLD AUTO: 4.4 K/UL (ref 3.8–10.6)
WBC # BLD AUTO: 4.6 K/UL (ref 3.8–10.6)
WBC # BLD AUTO: 4.8 K/UL (ref 3.8–10.6)
WBC # BLD AUTO: 5.4 K/UL (ref 3.8–10.6)

## 2019-06-09 PROCEDURE — 30233N1 TRANSFUSION OF NONAUTOLOGOUS RED BLOOD CELLS INTO PERIPHERAL VEIN, PERCUTANEOUS APPROACH: ICD-10-PCS

## 2019-06-09 PROCEDURE — 0DB88ZX EXCISION OF SMALL INTESTINE, VIA NATURAL OR ARTIFICIAL OPENING ENDOSCOPIC, DIAGNOSTIC: ICD-10-PCS

## 2019-06-09 RX ADMIN — CEFAZOLIN SCH MLS/HR: 330 INJECTION, POWDER, FOR SOLUTION INTRAMUSCULAR; INTRAVENOUS at 20:44

## 2019-06-09 RX ADMIN — SERTRALINE HYDROCHLORIDE SCH MG: 100 TABLET ORAL at 08:39

## 2019-06-09 RX ADMIN — HYDROCODONE BITARTRATE AND ACETAMINOPHEN PRN EACH: 5; 325 TABLET ORAL at 22:23

## 2019-06-09 RX ADMIN — HYDROCODONE BITARTRATE AND ACETAMINOPHEN PRN EACH: 5; 325 TABLET ORAL at 11:30

## 2019-06-09 RX ADMIN — CEFAZOLIN SCH MLS/HR: 330 INJECTION, POWDER, FOR SOLUTION INTRAMUSCULAR; INTRAVENOUS at 12:46

## 2019-06-09 RX ADMIN — BUDESONIDE AND FORMOTEROL FUMARATE DIHYDRATE SCH: 80; 4.5 AEROSOL RESPIRATORY (INHALATION) at 19:53

## 2019-06-09 RX ADMIN — LOSARTAN POTASSIUM AND HYDROCHLOROTHIAZIDE SCH EACH: 12.5; 5 TABLET ORAL at 08:39

## 2019-06-09 RX ADMIN — LEVOTHYROXINE SODIUM SCH MCG: 75 TABLET ORAL at 06:26

## 2019-06-09 RX ADMIN — CEFAZOLIN SCH MLS/HR: 330 INJECTION, POWDER, FOR SOLUTION INTRAMUSCULAR; INTRAVENOUS at 06:28

## 2019-06-09 RX ADMIN — PANTOPRAZOLE SODIUM SCH MG: 40 INJECTION, POWDER, FOR SOLUTION INTRAVENOUS at 08:39

## 2019-06-09 RX ADMIN — PANTOPRAZOLE SODIUM SCH MG: 40 INJECTION, POWDER, FOR SOLUTION INTRAVENOUS at 20:44

## 2019-06-09 NOTE — HP
HISTORY AND PHYSICAL



DATE OF SERVICE:

06/08/2019.



CHIEF COMPLAINT:

GI bleed.



HISTORY OF PRESENT ILLNESS:

This 48-year-old woman with a past medical history of multiple medical problems

including asthma, fibromyalgia, GERD, hypertension, hyperlipidemia, history of DJD, was

referred from [facility] because the patient felt GI bleed.  The patient had

an episode of gastrointestinal bleed last night.  The patient was recently admitted to

[facility] and had multiple evaluations including surgery for nasal fracture and

apparently brain bleed from a fall.  The patient was also taking NSAIDs.  The patient

initially had _____ later turned to melenic stools.  Hemoglobin found to be 8.3.  The

patient admitted to the hospital for further evaluation and treatment.  There is no

history of fever, rigors or chills. No headache, loss of consciousness or seizures.



PAST MEDICAL HISTORY:

History of fibromyalgia, GERD, hypertension, hyperlipidemia, history of bariatric

surgery. history of appendectomy.



MEDICATIONS:

Home medications are:

1. Zoloft 100 mg.

2. Omeprazole 20 mg daily.

3. Synthroid 150 mcg daily.

4. Advair 100/50 1 puff b.i.d.

5. Zyrtec 10 mg daily.

6. Ventolin 1 to 2 puffs q.6h p.r.n.

7. Hyzaar 50/12.5 one p.o. daily.



ALLERGIES:

PENICILLIN.



FAMILY HISTORY:

History of breast cancer in the family.



SOCIAL HISTORY:

No history of smoking.  Occasional alcohol intake.



REVIEW OF SYSTEMS:

ENT: No diminished hearing. No diminished vision.

CARDIOVASCULAR: No angina or palpitations.

RESPIRATORY: As mentioned earlier.

GASTROINTESTINAL: As mentioned earlier.

: No dysuria.

NERVOUS SYSTEM: No numbness or weakness.

ALLERGY/IMMUNOLOGY: No asthma or hayfever.

MUSCULOSKELETAL: As mentioned earlier.

HEMATOLOGY/ONCOLOGY: No history of anemia.

ENDOCRINE:  Hypothyroidism.

CONSTITUTIONAL: As mentioned earlier.

DERMATOLOGY: Negative.

RHEUMATOLOGY: Negative.

PSYCHIATRY: As mentioned earlier.



PHYSICAL EXAMINATION:

Alert and oriented x3.

Pulse 77. Blood pressure 120/58.  Respiration 18, temperature is 98.2, pulse ox 98% on

room air.  HEENT:  Conjunctivae pale. Oral mucosa moist.  Neck is no jugular venous

distention.  No carotid bruit. No lymph node enlargement.

CARDIOVASCULAR: S1, S2 muffled. No S3, no S4.

RESPIRATORY: Breath sounds diminished in the bases.  A few scattered rhonchi.  No

crackles.

ABDOMEN:  Soft.  Mild diffuse discomfort in the epigastrium.  Otherwise, no guarding,

no rigidity.  No mass palpable.

LEGS:  No edema.  No swelling.

Nervous system: Higher functions as mentioned earlier, moves all 4 limbs.  No focal

motor or sensory deficits.

LYMPHATICS:  No lymph nodes palpable in the neck, axilla or groin.

SKIN:  No ulcers, rashes or bleeding.

JOINTS:  No active deforming arthropathy.



LAB STUDIES:

WBC 6.7, hemoglobin is 8.2.  Other labs are noted.



ASSESSMENT:

1. Acute upper gastrointestinal bleeding with acute blood loss anemia.

2. History of recent nasal fracture and as well as intracranial bleeding.

3. Acute blood loss anemia.

4. Asthma.

5. Fibromyalgia.

6. Gastroesophageal reflux disease.

7. Hypertension.

8. Hyperlipidemia.

9. Degenerative joint disease.

10.History of gastric bypass surgery, [place]-en-Y.

11.History of cardiac catheterization.

12.History of tubal ligation.

13.History of motion sickness.

14.History of anxiety, depression.



RECOMMENDATIONS AND DISCUSSION:

This 48-year-old woman who presented with multiple complex medical issues, we will

monitor the patient closely.  Continue the current medications, management and

symptomatic treatment. Will monitor hemoglobin and H & H closely and transfuse

hemoglobin less than 7.  Recommend Gastroenterology surgical evaluation.  Possible

endoscope.  Resume the home medications. Protonix.  Hold NSAIDs and prognosis guarded

because of multiple complex medical issues.  further recommendations to follow.  See

orders for details.  A copy of this dictation being forwarded to Dr. Miguel Angel Burgos

who is the primary physician.





MMODL / IJN: [id number] / Job#: [id number]

## 2019-06-09 NOTE — P.CONS
History of Present Illness





- Reason for Consult


Consult date: 19


GI bleed


Requesting physician: Ashlyn Forte





- Chief Complaint


GI bleed





- History of Present Illness





48-year-old female with a history of hypothyroidism, hypertension, GERD and 

previous Garrett-en-Y gastric bypass 3 years ago presented with complaints of blood

per rectum.  The patient reports 2 days of burgundy colored bowel movements.  

She reports dark colored bowel movements with bright red blood around the edges 

toilet.  She denies any prior history of GI bleed.  She denies any nausea or 

vomiting.  She does report that prior to the bowel movement she was having some 

epigastric discomfort.  She does report that she has chronic diarrhea at 

baseline.  She denies any NSAID use and reports that she takes Tylenol for pain.

 She states that her last EGD and colonoscopy was prior to her Garrett-en-Y gastric

bypass surgery in 2016.  On presentation to the hospital she was found to have a

hemoglobin of 9.1 which subsequently trended to 8.3 and is currently 7.4.  WBC 

4.4 and platelet count 227,000.  Currently the patient is seen lying in bed 

denying any further GI bleeding today.  She is tolerating a liquid diet.





Review of Systems





REVIEW OF SYSTEMS:


CONSTITUTIONAL: Denies any fevers, chills, weight change or fatigue.


CARDIOVASCULAR: Denies any chest pain, palpitations high or low blood pressures


RESPIRATORY: Denies any shortness of breath, hemoptysis or cough.  


GENITOURINARY:  No dysuria or hematuria. 


MUSCULOSKELETAL: No weakness reported. 


SKIN: Denies any new rashes or lesions, jaundice or pallor. 


PSYCHIATRIC: Denies any depression or anxiety. 


NEUROLOGY: Denies headache, denies any new focal deficits. 


EARS/NOSE/THROAT: No recent hearing change, congestion, nasal discharge or sore 

throat.


EYES: No pain in eyes, discharge or change in vision. 


GASTROINTESTINAL: As per HPI.





Past Medical History


Past Medical History: Asthma, Chest Pain / Angina, Fibromyalgia, GERD/Reflux, 

Hyperlipidemia, Hypertension, Osteoarthritis (OA), Thyroid Disorder


Additional Past Medical History / Comment(s): DDD WITH BACK PAIN, FATTY LIVER. 

,TORN LT ROTATOR CUFF 2016


History of Any Multi-Drug Resistant Organisms: None Reported


Past Surgical History: Appendectomy, Bariatric Surgery, Bladder Surgery, 

Cholecystectomy, Heart Catheterization, Hysterectomy, Tubal Ligation


Additional Past Surgical History / Comment(s): partial thyroidectomy, bladder 

suspension w/mesh (as of 18 patient has since undergone sx 4x to have 

various portions of mesh removed), sinus repair surgery, bilateral knee surgery 

(meniscus, femur fracture on rt) 10-31-16 GARRETT EN Y GASTRIC BYPASS, EGD, 

COLONOSCOPY, panniculectomy 18


Past Anesthesia/Blood Transfusion Reactions: Family History of Problems w/ 

Anesthesia, Motion Sickness


Additional Past Anesthesia/Blood Transfusion Reaction / Comm: Pt has difficulty 

waking up.  Very difficult intubation.  Pts grandmother has difficulty waking 

and also difficult intubation.


Past Psychological History: Anxiety, Depression


Smoking Status: Never smoker


Past Alcohol Use History: Rare


Past Drug Use History: None Reported





- Past Family History


  ** Mother


Family Medical History: Cancer


Additional Family Medical History / Comment(s): Breast CA





  ** Father


Family Medical History: Cancer, CVA/TIA


Additional Family Medical History / Comment(s): - stroke





Medications and Allergies


                                Home Medications











 Medication  Instructions  Recorded  Confirmed  Type


 


Levothyroxine Sodium [Synthroid] 150 mcg PO DAILY 02/27/15 06/08/19 History


 


Losartan-Hctz 50-12.5 mg [Hyzaar 1 tab PO DAILY 17 History





50-12.5]    


 


Cetirizine HCl [Zyrtec] 10 mg PO DAILY 18 History


 


Albuterol Inhaler [Ventolin Hfa 1 - 2 puff INHALATION RT-Q6H PRN 19 History





Inhaler]    


 


Fluticasone/Salmeterol [Advair 1 inhalation PO RT-Q12H 19 History





100-50 Diskus]    


 


Omeprazole 20 mg PO DAILY 19 History


 


Sertraline HCl [Zoloft] 100 mg PO DAILY 19 History








                                    Allergies











Allergy/AdvReac Type Severity Reaction Status Date / Time


 


Penicillins Allergy  Unknown Verified 19 20:43














Physical Exam


Vitals: 


                                   Vital Signs











  Temp Pulse Pulse Resp BP BP Pulse Ox


 


 19 12:00    73    98/56  98


 


 19 08:00  97.3 F L   84    112/56  99


 


 19 04:00  98.2 F   77  18   120/58  98


 


 19 23:57  98.4 F   70  18   114/56  100


 


 19 23:05  98.7 F  82   16  102/41   99


 


 19 20:51   86   16  110/77   99


 


 19 20:01  99.5 F  97   16  121/77   99








                                Intake and Output











 19





 06:59 14:59 22:59


 


Intake Total 700 1480 


 


Balance 700 1480 


 


Intake:   


 


  Intake, IV Titration 700 1000 





  Amount   


 


    Sodium Chloride 0.9% 1, 700 1000 





    000 ml @ 125 mls/hr IV .   





    Q8H [facility] Rx#:[id number]   


 


  Oral  480 


 


Other:   


 


  Weight 81.601 kg  














On physical examination, patient appears comfortable in no apparent distress. 


HEAD: Normocephalic, atraumatic. 


EYES: No scleral icterus. No conjunctival injection. 


MOUTH: No lesions, tongue midline. 


NECK: Trachea midline, no gross abnormalities. 


CHEST: Clear to auscultation with no wheezing or rhonchi appreciated. 


HEART: Regular rate and rhythm. 


ABDOMEN: Soft. Bowel sounds are positive. No organomegaly.  No guarding or 

rigidity.


EXTREMITIES: No pedal edema. 


SKIN: No rashes, no jaundice. 


NEUROLOGIC: Alert and oriented x3.  No focal deficits. 





Results


CBC & Chem 7: 


                                 19 14:13





                                 19 02:15


Labs: 


                  Abnormal Lab Results - Last 24 Hours (Table)











  19 Range/Units





  20:20 20:20 02:15 


 


RBC   3.20 L   (3.80-5.40)  m/uL


 


Hgb   8.3 L   (11.4-16.0)  gm/dL


 


Hct   26.0 L   (34.0-46.0)  %


 


Chloride    109 H  ()  mmol/L


 


BUN    19 H  (7-17)  mg/dL


 


Ur Specific Gravity     (1.001-1.035)  


 


Urine Protein     (Negative)  


 


Urine Ketones     (Negative)  


 


Crossmatch  See Detail    














  19 Range/Units





  02:15 02:47 07:53 


 


RBC  2.81 L   2.78 L  (3.80-5.40)  m/uL


 


Hgb  7.3 L   7.4 L  (11.4-16.0)  gm/dL


 


Hct  22.9 L   23.1 L  (34.0-46.0)  %


 


Chloride     ()  mmol/L


 


BUN     (7-17)  mg/dL


 


Ur Specific Gravity   1.043 H   (1.001-1.035)  


 


Urine Protein   Trace H   (Negative)  


 


Urine Ketones   1+ H   (Negative)  


 


Crossmatch     














  19 Range/Units





  14:13 


 


RBC  2.72 L  (3.80-5.40)  m/uL


 


Hgb  7.0 L  (11.4-16.0)  gm/dL


 


Hct  22.5 L  (34.0-46.0)  %


 


Chloride   ()  mmol/L


 


BUN   (7-17)  mg/dL


 


Ur Specific Gravity   (1.001-1.035)  


 


Urine Protein   (Negative)  


 


Urine Ketones   (Negative)  


 


Crossmatch   











Comments: 





No imaging available on current hospitalization.





Assessment and Plan


(1) GI bleed


Narrative/Plan: 


48-year-old female with medical history significant for hypothyroidism, 

hypertension, GERD and prior Garrett-en-Y dark maroon-colored stool describing dark

 stool with bright red blood noted in the room of the toilet.  She denies any 

prior history of GI bleeding and denies any current NSAID therapy.  Currently 

she is on home omeprazole therapy daily.  Last endoscopic evaluation was in 

 at which time the patient underwent EGD and colonoscopy prior to her gastric 

bypass surgery.  Extensive discussion with the patient on possible etiologies of

 the GI bleed including upper GI bleed with differential including Sarah-Styles

 tear, esophagitis/gastritis, anastomotic site ulcer, peptic ulcer disease or 

other etiology with lower GI bleed also not excluded given the patient's 

description.  Did discuss upper endoscopy for evaluation however the surgical 

service has also seen the patient and her finding on endoscopic evaluation 

tomorrow.  We'll defer to their expertise at this time.


Current Visit: Yes   Status: Acute   Code(s): K92.2 - GASTROINTESTINAL 

HEMORRHAGE, UNSPECIFIED   SNOMED Code(s): 38804964


   





(2) Acute blood loss anemia


Current Visit: No   Status: Acute   Code(s): D62 - ACUTE POSTHEMORRHAGIC ANEMIA 

  SNOMED Code(s): 571832522


   





(3) Gastroesophageal reflux disease


Current Visit: No   Status: Chronic   Code(s): K21.9 - GASTRO-ESOPHAGEAL REFLUX 

DISEASE WITHOUT ESOPHAGITIS   SNOMED Code(s): 365087258


   


Plan: 





Supportive care


Clear liquid diet


Continue IV Protonix twice daily


Continue to monitor hemoglobin and transfuse as needed


Continue to monitor stool output


Avoid NSAID use


Surgical service also consulted and planning for endoscopic evaluation, we'll 

defer to their management and standby at this time


Thank you for allowing us to participate in the care of this patient

## 2019-06-09 NOTE — P.GSCN
History of Present Illness


Consult date: 19


Reason for Consult: 





GI bleed


History of present illness: 





This a 48-year-old female who presents to the hospital complaints of GI bleed.  

Patient describes dark bloody stools with some evidence of black coffee grounds.

 Patient has a previous history of gastric bypass.  She's never had a previous 

GI bleed.





Past Medical History


Past Medical History: Asthma, Chest Pain / Angina, Fibromyalgia, GERD/Reflux, 

Hyperlipidemia, Hypertension, Osteoarthritis (OA), Thyroid Disorder


Additional Past Medical History / Comment(s): DDD WITH BACK PAIN, FATTY LIVER. 

,TORN LT ROTATOR CUFF 2016


History of Any Multi-Drug Resistant Organisms: None Reported


Past Surgical History: Appendectomy, Bariatric Surgery, Bladder Surgery, 

Cholecystectomy, Heart Catheterization, Hysterectomy, Tubal Ligation


Additional Past Surgical History / Comment(s): partial thyroidectomy, bladder 

suspension w/mesh (as of 18 patient has since undergone sx 4x to have 

various portions of mesh removed), sinus repair surgery, bilateral knee surgery 

(meniscus, femur fracture on rt) 10-31-16 MILO EN Y GASTRIC BYPASS, EGD, 

COLONOSCOPY, panniculectomy 18


Past Anesthesia/Blood Transfusion Reactions: Family History of Problems w/ 

Anesthesia, Motion Sickness


Additional Past Anesthesia/Blood Transfusion Reaction / Comm: Pt has difficulty 

waking up.  Very difficult intubation.  Pts grandmother has difficulty waking 

and also difficult intubation.


Past Psychological History: Anxiety, Depression


Smoking Status: Never smoker


Past Alcohol Use History: Rare


Past Drug Use History: None Reported





- Past Family History


  ** Mother


Family Medical History: Cancer


Additional Family Medical History / Comment(s): Breast CA





  ** Father


Family Medical History: Cancer, CVA/TIA


Additional Family Medical History / Comment(s): - stroke





Medications and Allergies


                                Home Medications











 Medication  Instructions  Recorded  Confirmed  Type


 


Levothyroxine Sodium [Synthroid] 150 mcg PO DAILY 02/27/15 06/08/19 History


 


Losartan-Hctz 50-12.5 mg [Hyzaar 1 tab PO DAILY 17 History





50-12.5]    


 


Cetirizine HCl [Zyrtec] 10 mg PO DAILY 18 History


 


Albuterol Inhaler [Ventolin Hfa 1 - 2 puff INHALATION RT-Q6H PRN 19 History





Inhaler]    


 


Fluticasone/Salmeterol [Advair 1 inhalation PO RT-Q12H 19 History





100-50 Diskus]    


 


Omeprazole 20 mg PO DAILY 19 History


 


Sertraline HCl [Zoloft] 100 mg PO DAILY 19 History








                                    Allergies











Allergy/AdvReac Type Severity Reaction Status Date / Time


 


Penicillins Allergy  Unknown Verified 19 20:43














Surgical - Exam


                                   Vital Signs











Temp Pulse Resp BP Pulse Ox


 


 99.5 F   97   16   121/77   99 


 


 19 20:01  19 20:01  19 20:01  19 20:01  19 20:01














- General


well developed, well nourished, no distress





- Eyes


PERRL





- ENT


normal pinna





- Neck


no masses





- Respiratory


normal expansion





- Cardiovascular


Rhythm: regular





- Abdomen


Abdomen: soft, non tender





Results





- Labs





                                 19 07:53





                                 19 02:15


                  Abnormal Lab Results - Last 24 Hours (Table)











  19 Range/Units





  20:20 02:15 02:15 


 


RBC  3.20 L   2.81 L  (3.80-5.40)  m/uL


 


Hgb  8.3 L   7.3 L  (11.4-16.0)  gm/dL


 


Hct  26.0 L   22.9 L  (34.0-46.0)  %


 


Chloride   109 H   ()  mmol/L


 


BUN   19 H   (7-17)  mg/dL


 


Ur Specific Gravity     (1.001-1.035)  


 


Urine Protein     (Negative)  


 


Urine Ketones     (Negative)  














  19 Range/Units





  02:47 07:53 


 


RBC   2.78 L  (3.80-5.40)  m/uL


 


Hgb   7.4 L  (11.4-16.0)  gm/dL


 


Hct   23.1 L  (34.0-46.0)  %


 


Chloride    ()  mmol/L


 


BUN    (7-17)  mg/dL


 


Ur Specific Gravity  1.043 H   (1.001-1.035)  


 


Urine Protein  Trace H   (Negative)  


 


Urine Ketones  1+ H   (Negative)  








                                 Diabetes panel











  19 Range/Units





  02:15 


 


Sodium  140  (137-145)  mmol/L


 


Potassium  4.1  (3.5-5.1)  mmol/L


 


Chloride  109 H  ()  mmol/L


 


Carbon Dioxide  28  (22-30)  mmol/L


 


BUN  19 H  (7-17)  mg/dL


 


Creatinine  0.65  (0.52-1.04)  mg/dL


 


Glucose  90  (74-99)  mg/dL


 


Calcium  8.7  (8.4-10.2)  mg/dL








                                  Calcium panel











  19 Range/Units





  02:15 


 


Calcium  8.7  (8.4-10.2)  mg/dL








                                 Pituitary panel











  19 Range/Units





  02:15 


 


Sodium  140  (137-145)  mmol/L


 


Potassium  4.1  (3.5-5.1)  mmol/L


 


Chloride  109 H  ()  mmol/L


 


Carbon Dioxide  28  (22-30)  mmol/L


 


BUN  19 H  (7-17)  mg/dL


 


Creatinine  0.65  (0.52-1.04)  mg/dL


 


Glucose  90  (74-99)  mg/dL


 


Calcium  8.7  (8.4-10.2)  mg/dL








                                  Adrenal panel











  19 Range/Units





  02:15 


 


Sodium  140  (137-145)  mmol/L


 


Potassium  4.1  (3.5-5.1)  mmol/L


 


Chloride  109 H  ()  mmol/L


 


Carbon Dioxide  28  (22-30)  mmol/L


 


BUN  19 H  (7-17)  mg/dL


 


Creatinine  0.65  (0.52-1.04)  mg/dL


 


Glucose  90  (74-99)  mg/dL


 


Calcium  8.7  (8.4-10.2)  mg/dL














Assessment and Plan


Assessment: 





GI bleed.  Patient will be evaluated to rule out marginal ulcer from the 

gastrojejunostomy.  We will schedule for EGD in the a.m.

## 2019-06-09 NOTE — PN
PROGRESS NOTE



DATE OF SERVICE:

06/09/2019.



This 48-year-old woman is admitted with GI bleed with acute blood loss anemia as well.

Hemoglobin is 7 today.  No active bleeding is noted.  The patient had a previous

gastric bypass surgery also.  No chest pain.  No palpitations.  No fever.



PAST MEDICAL HISTORY:

Reviewed.



REVIEW OF SYSTEMS:

CARDIOVASCULAR: No angina.

RESPIRATORY: As mentioned.

GI: No nausea.

: No dysuria.

NERVOUS SYSTEM: No numbness or weakness.



CURRENT MEDICATIONS:

Reviewed and include:

1. Tylenol 500 q.6h p.r.n.

2. Norco 5 mg q.6h.

3. Ventolin.

4. Xanax 0.5 t.i.d.

5. Symbicort 80/4.5 b.i.d.

6. Synthroid 150 mg p.o. daily.

7. Narcan 0.2 q.2h p.r.n.

8. Protonix 40 mg b.i.d.

9. Zoloft 100 mg daily.

10.Restoril.



PHYSICAL EXAM:

Patient is alert, oriented x2.  Pulse is 84, blood pressure 112/56, respiration 18,

temperature 97.2, pulse ox 98% on room air.

HEENT: Conjunctivae pale.

NECK: No jugular venous distention.

CARDIOVASCULAR: S1, S2.

RESPIRATORY: Breath sounds diminished in the bases. A few scattered rhonchi.

ABDOMEN:  Soft, nontender.

LEGS: No edema, no swelling.

NERVOUS SYSTEM: No focal deficits.



LAB STUDIES:

WBC 12.2, hemoglobin 7.



ASSESSMENT:

1. Acute upper gastrointestinal bleeding with acute on chronic blood-loss anemia.

2. History of gastric bypass surgery.

3. History of asthma.

4. Fibromyalgia.

5. Gastroesophageal reflux disease.

6. Hypertension.

7. Hyperlipidemia.

8. History of degenerative joint disease.

9. History of bariatric surgery.

10.History of cardiac catheterization.

11.History of cholecystectomy.

12.History of _____, anxiety, depression.

13.FULL CODE.



RECOMMENDATIONS AND DISCUSSION:

This 48-year-old woman who presented with multiple complex medical issues, will monitor

the patient closely. Continue the current management and symptomatic treatments.

Otherwise at this time, I recommend transfusions and closely follow with

Gastroenterology possible endoscopies. Follow with Surgery. Guarded prognosis. Further

recommendations to follow.





MMODL / IJN: [id number] / Job#: [id number]

## 2019-06-10 LAB
ANION GAP SERPL CALC-SCNC: 3 MMOL/L
BASOPHILS # BLD AUTO: 0 K/UL (ref 0–0.2)
BASOPHILS NFR BLD AUTO: 1 %
BUN SERPL-SCNC: 9 MG/DL (ref 7–17)
CALCIUM SPEC-MCNC: 8.9 MG/DL (ref 8.4–10.2)
CHLORIDE SERPL-SCNC: 111 MMOL/L (ref 98–107)
CO2 SERPL-SCNC: 27 MMOL/L (ref 22–30)
EOSINOPHIL # BLD AUTO: 0.2 K/UL (ref 0–0.7)
EOSINOPHIL NFR BLD AUTO: 4 %
ERYTHROCYTE [DISTWIDTH] IN BLOOD BY AUTOMATED COUNT: 3.15 M/UL (ref 3.8–5.4)
ERYTHROCYTE [DISTWIDTH] IN BLOOD: 15 % (ref 11.5–15.5)
GLUCOSE SERPL-MCNC: 85 MG/DL (ref 74–99)
HCT VFR BLD AUTO: 25.9 % (ref 34–46)
HGB BLD-MCNC: 8.5 GM/DL (ref 11.4–16)
LYMPHOCYTES # SPEC AUTO: 1.5 K/UL (ref 1–4.8)
LYMPHOCYTES NFR SPEC AUTO: 38 %
MCH RBC QN AUTO: 26.9 PG (ref 25–35)
MCHC RBC AUTO-ENTMCNC: 32.8 G/DL (ref 31–37)
MCV RBC AUTO: 82 FL (ref 80–100)
MONOCYTES # BLD AUTO: 0.3 K/UL (ref 0–1)
MONOCYTES NFR BLD AUTO: 7 %
NEUTROPHILS # BLD AUTO: 1.9 K/UL (ref 1.3–7.7)
NEUTROPHILS NFR BLD AUTO: 47 %
PLATELET # BLD AUTO: 196 K/UL (ref 150–450)
POTASSIUM SERPL-SCNC: 4.5 MMOL/L (ref 3.5–5.1)
SODIUM SERPL-SCNC: 141 MMOL/L (ref 137–145)
WBC # BLD AUTO: 4 K/UL (ref 3.8–10.6)

## 2019-06-10 RX ADMIN — LEVOTHYROXINE SODIUM SCH MCG: 75 TABLET ORAL at 06:23

## 2019-06-10 RX ADMIN — HYDROCODONE BITARTRATE AND ACETAMINOPHEN PRN EACH: 5; 325 TABLET ORAL at 18:27

## 2019-06-10 RX ADMIN — BUDESONIDE AND FORMOTEROL FUMARATE DIHYDRATE SCH PUFF: 80; 4.5 AEROSOL RESPIRATORY (INHALATION) at 21:19

## 2019-06-10 RX ADMIN — BUDESONIDE AND FORMOTEROL FUMARATE DIHYDRATE SCH: 80; 4.5 AEROSOL RESPIRATORY (INHALATION) at 00:04

## 2019-06-10 RX ADMIN — BUDESONIDE AND FORMOTEROL FUMARATE DIHYDRATE SCH: 80; 4.5 AEROSOL RESPIRATORY (INHALATION) at 21:46

## 2019-06-10 RX ADMIN — SUCRALFATE SCH GM: 1 TABLET ORAL at 16:42

## 2019-06-10 RX ADMIN — PANTOPRAZOLE SODIUM SCH MG: 40 INJECTION, POWDER, FOR SOLUTION INTRAVENOUS at 21:41

## 2019-06-10 RX ADMIN — SUCRALFATE SCH: 1 TABLET ORAL at 15:59

## 2019-06-10 RX ADMIN — CEFAZOLIN SCH: 330 INJECTION, POWDER, FOR SOLUTION INTRAMUSCULAR; INTRAVENOUS at 23:16

## 2019-06-10 RX ADMIN — BUDESONIDE AND FORMOTEROL FUMARATE DIHYDRATE SCH: 80; 4.5 AEROSOL RESPIRATORY (INHALATION) at 19:48

## 2019-06-10 RX ADMIN — BUDESONIDE AND FORMOTEROL FUMARATE DIHYDRATE SCH PUFF: 80; 4.5 AEROSOL RESPIRATORY (INHALATION) at 21:18

## 2019-06-10 RX ADMIN — LOSARTAN POTASSIUM AND HYDROCHLOROTHIAZIDE SCH EACH: 12.5; 5 TABLET ORAL at 08:55

## 2019-06-10 RX ADMIN — CEFAZOLIN SCH: 330 INJECTION, POWDER, FOR SOLUTION INTRAMUSCULAR; INTRAVENOUS at 16:38

## 2019-06-10 RX ADMIN — PANTOPRAZOLE SODIUM SCH MG: 40 INJECTION, POWDER, FOR SOLUTION INTRAVENOUS at 08:55

## 2019-06-10 RX ADMIN — SERTRALINE HYDROCHLORIDE SCH MG: 100 TABLET ORAL at 08:55

## 2019-06-10 NOTE — P.PN
Subjective


Progress Note Date: 06/10/19


Principal diagnosis: 





Upper GI bleed,





Patient seen after EGD.  She is reporting no abdominal pain, nausea or vomiting.

 Has tolerated her diet.





Objective





- Vital Signs


Vital signs: 


                                   Vital Signs











Temp  98.9 F   06/10/19 12:00


 


Pulse  98   06/10/19 12:00


 


Resp  18   06/10/19 12:00


 


BP  117/75   06/10/19 12:00


 


Pulse Ox  98   06/10/19 12:00








                                 Intake & Output











 06/09/19 06/10/19 06/10/19





 18:59 06:59 18:59


 


Intake Total 1960 1235 50


 


Output Total  700 


 


Balance 1960 535 50


 


Weight  83.189 kg 


 


Intake:   


 


  IV   50


 


  Intake, IV Titration 1000 350 





  Amount   


 


    Sodium Chloride 0.9% 1, 1000 350 





    000 ml @ 75 mls/hr IV .   





    [id number] [facility] Rx#:[id number]   


 


  Oral 960 575 


 


  Blood Product  310 


 


    Rc As-1  Unit  310 





    [id number]   


 


Output:   


 


  Urine  700 


 


Other:   


 


  # Voids  1 














- Exam





On physical examination, patient appears comfortable in no apparent distress. 


HEAD: Normocephalic, atraumatic. 


EYES: No scleral icterus. No conjunctival injection. 


MOUTH: No lesions, tongue midline. 


NECK: Trachea midline, no gross abnormalities. 


CHEST: Clear to auscultation with no wheezing or rhonchi appreciated. 


HEART: Regular rate and rhythm. 


ABDOMEN: Soft. Bowel sounds are positive. No organomegaly.  No guarding or 

rigidity.


EXTREMITIES: No pedal edema. 


SKIN: No rashes, no jaundice. 


NEUROLOGIC: Alert and oriented x3.  No focal deficits. 





- Labs


CBC & Chem 7: 


                                 06/10/19 06:44





                                 06/10/19 06:44


Labs: 


                  Abnormal Lab Results - Last 24 Hours (Table)











  06/08/19 06/09/19 06/09/19 Range/Units





  20:20 14:13 20:06 


 


RBC   2.72 L  2.72 L  (3.80-5.40)  m/uL


 


Hgb   7.0 L  7.4 L  (11.4-16.0)  gm/dL


 


Hct   22.5 L  22.6 L  (34.0-46.0)  %


 


Chloride     ()  mmol/L


 


Crossmatch  See Detail    














  06/10/19 06/10/19 Range/Units





  06:44 06:44 


 


RBC  3.15 L   (3.80-5.40)  m/uL


 


Hgb  8.5 L   (11.4-16.0)  gm/dL


 


Hct  25.9 L   (34.0-46.0)  %


 


Chloride   111 H  ()  mmol/L


 


Crossmatch    














Assessment and Plan


(1) GI bleed


Narrative/Plan: 


48-year-old female with medical history significant for hypothyroidism, 

hypertension, GERD and prior Garrett-en-Y dark maroon-colored stool describing dark

stool with bright red blood noted in the room of the toilet.  She denies any 

prior history of GI bleeding and denies any current NSAID therapy.  Currently 

she is on home omeprazole therapy daily.  Last endoscopic evaluation was in 2016

at which time the patient underwent EGD and colonoscopy prior to her gastric 

bypass surgery.  Patient taken for EGD today with surgical service with findings

of anastomotic site ulcer.


Current Visit: Yes   Status: Acute   Code(s): K92.2 - GASTROINTESTINAL 

HEMORRHAGE, UNSPECIFIED   SNOMED Code(s): 35134661


   





(2) Acute blood loss anemia


Current Visit: No   Status: Acute   Code(s): D62 - ACUTE POSTHEMORRHAGIC ANEMIA 

 SNOMED Code(s): 530084956


   





(3) Gastroesophageal reflux disease


Current Visit: No   Status: Chronic   Code(s): K21.9 - GASTRO-ESOPHAGEAL REFLUX 

DISEASE WITHOUT ESOPHAGITIS   SNOMED Code(s): 263594042


   


Plan: 





Supportive care


Diet per surgical service


Continue IV Protonix twice daily


Continue to monitor hemoglobin and transfuse as needed


Continue to monitor stool output


Avoid NSAID use


Thank you for allowing us to participate in the care of the patient, the GI 

service will stand by, please call us back with any questions or concerns

## 2019-06-10 NOTE — P.OP
Date of Procedure: 06/10/19


Preoperative Diagnosis: 


GI bleed


Postoperative Diagnosis: 


Marginal ulcer at gastrojejunostomy


Procedure(s) Performed: 


EGD


Anesthesia: MAC


Surgeon: Agusto Kaminski


Pathology: other (Gastrojejunostomy)


Condition: stable


Disposition: PACU


Description of Procedure: 


The patient's placed on the endoscopy table in the lateral position.  She 

received IV sedation.  The gastric was placed oropharynx passed in the esophagus

and into the stomach.  The patient a previous gastric bypass.  The scope was 

then placed at the level of the gastrojejunostomy and there was a ulcer seen 

just below the gastrojejunostomy.  This was biopsied.  There is no evidence of 

any active bleeding.  The scope was then placed down to the jejunum and there is

known to any obstruction.  Scope was withdrawn.  The marginal ulcer was 

visualized at the gastric specimen.  There is no active bleeding seen the scope 

was withdrawn the patient's gastric pouch appeared normal.  The GE junction was 

at 40 cm.  The distal esophagus appeared normal.  The proximal esophagus.  

Normal.





It appears that the patient's previous GI bleed was due to marginals.  Patient 

will be treated medically.

## 2019-06-10 NOTE — P.PN
Subjective





This is a pleasant 48 years old female with past medical history of asthma, 

fibromyalgia, GERD, hyperlipidemia, hypertension, osteoarthritis.  Presents with

bloody bowel movements with nausea and vomiting.  Also she had some right upper 

quadrant and epigastric tenderness.  She had EGD this morning by the surgical 

team showing: Non-bleeding intestinal ulcer below the gastrojejunostomy














Objective





- Vital Signs


Vital signs: 


                                   Vital Signs











Temp  98.9 F   06/10/19 12:00


 


Pulse  98   06/10/19 12:00


 


Resp  18   06/10/19 12:00


 


BP  117/75   06/10/19 12:00


 


Pulse Ox  98   06/10/19 12:00








                                 Intake & Output











 06/09/19 06/10/19 06/10/19





 18:59 06:59 18:59


 


Intake Total 1960 1235 290


 


Output Total  700 


 


Balance 1960 535 290


 


Weight  83.189 kg 


 


Intake:   


 


  IV   50


 


  Intake, IV Titration 1000 350 





  Amount   


 


    Sodium Chloride 0.9% 1, 1000 350 





    000 ml @ 75 mls/hr IV .   





    [id number] MARLYN Rx#:[id number]   


 


  Oral 960 575 240


 


  Blood Product  310 


 


    Rc As-1  Unit  310 





    [id number]   


 


Output:   


 


  Urine  700 


 


Other:   


 


  # Voids  1 














- Exam





GENERAL: The patient is alert and oriented x3, not in any acute distress. Well 

developed, well nourished. 


HEENT: Pupils are round and equally reacting to light. EOMI. No scleral icterus.

No conjunctival pallor. Normocephalic, atraumatic. No pharyngeal erythema. No 

thyromegaly. 


CARDIOVASCULAR: S1 and S2 present. No murmurs, rubs, or gallops. 


PULMONARY: Chest is clear to auscultation, no wheezing or crackles. 


ABDOMEN: Soft, right upper quadrant and epigastric tenderness which are mild and

no rebound tenderness.  nondistended, normoactive bowel sounds. No palpable 

organomegaly. 


MUSCULOSKELETAL: No joint swelling or deformity. 


EXTREMITIES: No cyanosis, clubbing, or pedal edema. 


NEUROLOGICAL: Gross neurological examination did not reveal any focal deficits. 


SKIN: No rashes.





- Labs


CBC & Chem 7: 


                                 06/10/19 06:44





                                 06/10/19 06:44


Labs: 


                  Abnormal Lab Results - Last 24 Hours (Table)











  06/08/19 06/09/19 06/09/19 Range/Units





  20:20 14:13 20:06 


 


RBC   2.72 L  2.72 L  (3.80-5.40)  m/uL


 


Hgb   7.0 L  7.4 L  (11.4-16.0)  gm/dL


 


Hct   22.5 L  22.6 L  (34.0-46.0)  %


 


Chloride     ()  mmol/L


 


Crossmatch  See Detail    














  06/10/19 06/10/19 Range/Units





  06:44 06:44 


 


RBC  3.15 L   (3.80-5.40)  m/uL


 


Hgb  8.5 L   (11.4-16.0)  gm/dL


 


Hct  25.9 L   (34.0-46.0)  %


 


Chloride   111 H  ()  mmol/L


 


Crossmatch    














Assessment and Plan


Assessment: 





Acute non-bleeding intestinal ulcer below the gastrojejunostomy point


Blood per rectum, secondary to above


Acute blood loss anemia, secondary to above


History of asthma, not an active issue


History of fibromyalgia


History of GERD


Hyperlipidemia


Hypertension


History of posterior arthritis








Plan: 





This is a pleasant 48 years old female who presents with signs and symptoms of 

intestinal ulcer, nonbleeding.  Seen today and the EGD.  Patient vitals stable. 

Continue with antiacids.  Follow-up surgery recommendation.  Labs and medication

were reviewed..  Continue same treatment.  Continue with symptomatic treatment. 

Resume home medication.  Monitor lytes and vitals.  DVT and GI prophylaxis.  

Further recommendations of the clinical course of the patient


DVT prophylaxis: No heparin and review of GI bleed


GI Prophylaxis Protonix

## 2019-06-10 NOTE — HP
HISTORY AND PHYSICAL



DATE OF SERVICE:

06/08/2019



CHIEF COMPLAINT:

GI bleed.



HISTORY OF PRESENT ILLNESS:

This 48-year-old woman with a past medical history of multiple medical problems

including history of asthma, chest pain, diabetes type 2, fibromyalgia, GERD,

hypertension, hyperlipidemia, history of DJD, history of appendectomy, bariatric

surgery, Garrett-en-Y surgery, being followed by Dr. Aubrey Michelle in the [place]

area, recently had a fall by missing the step and the patient apparently had a nasal

bone fracture and intracranial bleeding also.  [place] [facility] [place] had surgery for the nasal bone fracture.  The patient

apparently might have taken couple of Motrin recently and last night about 10:00 pm

patient noted bowel movements with burgundy in color.  Subsequently the color became

more black.  Multiple episodes are noted.  Hemoglobin is 9.1, but currently the

hemoglobin is 8.8.  The patient admitted to the hospital for further evaluation and

treatment.  The patient is complaining of minimal upper abdominal discomfort.  There is

no history of fever, rigors.  No history of headache,  loss of consciousness or

seizures at this time.  The patient had abdominoplasty and subsequent infections, which

was also treated in [facility] as well.  There is no history of fever

rigors no headache, loss of consciousness, seizures at this time.



PAST MEDICAL HISTORY:

History of asthma, chest pain, diabetes type 2, fibromyalgia, GERD, hypertension,

hypertension, hyperlipidemia, history of DJD, history of bariatric surgery,

appendectomy.



MEDICATIONS:

Home medications are:

1. Zoloft 100 mg p.o. daily.

2. Omeprazole 20 mg p.o. daily.

3. Synthroid 150 mcg.

4. Advair 500/50 one p.o. b.i.d.

5. Zyrtec 10 mg p.o.

6. Ventolin HFA 1-2 puffs q.6h p.r.n.

7. Hyzaar 50/12.5 mg p.o. daily.



ALLERGIES:

PENICILLIN.



FAMILY HISTORY:

History of breast cancer in the family.



SOCIAL HISTORY:

No history of smoking.  Occasional alcohol intake.



REVIEW OF SYSTEMS:

ENT: No diminished vision. No diminished hearing.

CARDIOVASCULAR: No angina.

RESPIRATION:S no cough.

GI mentioned earlier.

: No dysuria.

NERVOUS SYSTEM: No asthma or hayfever.

MUSCULOSKELETAL as mentioned hematology.

HEMATOLOGY: As mentioned earlier.

ENDOCRINE:  Hypothyroid.

CONSTITUTIONAL as mentioned earlier.

DERMATOLOGY:  Negative.

RHEUMATOLOGY negative.  PSYCHIATRY as mentioned earlier.



PHYSICAL EXAM:

Patient is alert, oriented x3.  Pulse is 82,  blood pressure 102/49, respirations 16,

temperature 98.7, pulse ox 98% on room air.  Conjunctivae pale.  Oral mucosa moist.

Neck is no jugular venous distention.  No carotid bruit. No lymph nodes.

CARDIOVASCULAR:  S1, S2 muffled. No room for S3, S4.  Breath sounds diminished in the

bases.  No rhonchi.  No crackles.

ABDOMEN:  Soft.  Mild diffuse discomfort in the epigastrium.  Otherwise no mass

palpable.  No guarding.  No rigidity.  No tenderness.  No ascites.  No

hepatosplenomegaly.

LEGS:  No edema.  No swelling.

NERVOUS system as mentioned earlier.  Moves all 4 limbs.  No focal motor or sensory

deficits.

LYMPHATICS:  No lymph nodes palpable in the neck, axilla or groin.



LABS:

WBC 6.9, hemoglobin is 8.3.



ASSESSMENT:

1. Possible upper gastrointestinal bleeding with acute blood loss anemia.

2. History of Garrett-en-Y gastric bypass surgery.

3. History of recent fall, nasal fracture, intracranial bleeding.

4. History of asthma.

5. Diabetes type 2.

6. History of chest pain.

7. History of fibromyalgia.

8. Gastroesophageal reflux disease.

9. Hypertension.

10.Hyperlipidemia.

11.History of degenerative joint disease.

12.History of obstructive sleep apnea.

13.History of hypothyroidism.

14.History of obstructive sleep apnea, CPAP, history.

15.History of fatty liver.

16.History of bariatric surgery Garrett-en-Y.

17.History of cholecystectomy.

18.History of partial hysterectomy.

19.History of bladder suspension.

20.History of anxiety, depression.



RECOMMENDATIONS AND DISCUSSION:

In this 48-year-old woman who presented with multiple complex medical issues, we will

monitor the patient, closely continue the current medications, management and

treatments.  Otherwise, at this time, I would recommend H and H q.6 and hemoglobin, if

is less than 7, gastroenterology evaluation.  Surgical evaluation, possible EGD.  Avoid

NSAIDs and proton pump inhibitors.  Resume the home medications.  Cautious IV fluids.

Prognosis guarded because of multiple complex medical issues.  Type and cross 2 units

and a copy of this dictation being forwarded to Dr. Burgos who is the primary

physician. Please send a copy of dictation.





MMODL / IJN: [id number] / Job#: [id number]

## 2019-06-10 NOTE — XR
EXAMINATION TYPE: XR chest 1V portable

 

DATE OF EXAM: 6/10/2019

 

COMPARISON: 11/1/2016

 

HISTORY: Shortness of breath

 

TECHNIQUE: Single frontal view of the chest is obtained.

 

FINDINGS:  There is no focal air space opacity, pleural effusion, or pneumothorax seen.  The cardiac 
silhouette size is within normal limits.   The osseous structures are intact. Arthropathy shoulders. 
No overt failure.

 

IMPRESSION:  No acute process.

## 2019-06-11 VITALS — HEART RATE: 79 BPM | SYSTOLIC BLOOD PRESSURE: 130 MMHG | DIASTOLIC BLOOD PRESSURE: 66 MMHG | TEMPERATURE: 97.9 F

## 2019-06-11 VITALS — RESPIRATION RATE: 18 BRPM

## 2019-06-11 LAB
BASOPHILS # BLD AUTO: 0 K/UL (ref 0–0.2)
BASOPHILS NFR BLD AUTO: 0 %
EOSINOPHIL # BLD AUTO: 0.2 K/UL (ref 0–0.7)
EOSINOPHIL NFR BLD AUTO: 4 %
ERYTHROCYTE [DISTWIDTH] IN BLOOD BY AUTOMATED COUNT: 3.32 M/UL (ref 3.8–5.4)
ERYTHROCYTE [DISTWIDTH] IN BLOOD: 14.7 % (ref 11.5–15.5)
HCT VFR BLD AUTO: 27.2 % (ref 34–46)
HGB BLD-MCNC: 8.6 GM/DL (ref 11.4–16)
LYMPHOCYTES # SPEC AUTO: 1.6 K/UL (ref 1–4.8)
LYMPHOCYTES NFR SPEC AUTO: 32 %
MCH RBC QN AUTO: 26 PG (ref 25–35)
MCHC RBC AUTO-ENTMCNC: 31.7 G/DL (ref 31–37)
MCV RBC AUTO: 81.9 FL (ref 80–100)
MONOCYTES # BLD AUTO: 0.3 K/UL (ref 0–1)
MONOCYTES NFR BLD AUTO: 6 %
NEUTROPHILS # BLD AUTO: 2.7 K/UL (ref 1.3–7.7)
NEUTROPHILS NFR BLD AUTO: 55 %
PLATELET # BLD AUTO: 259 K/UL (ref 150–450)
WBC # BLD AUTO: 4.9 K/UL (ref 3.8–10.6)

## 2019-06-11 RX ADMIN — PANTOPRAZOLE SODIUM SCH MG: 40 INJECTION, POWDER, FOR SOLUTION INTRAVENOUS at 09:50

## 2019-06-11 RX ADMIN — LOSARTAN POTASSIUM AND HYDROCHLOROTHIAZIDE SCH EACH: 12.5; 5 TABLET ORAL at 09:50

## 2019-06-11 RX ADMIN — SERTRALINE HYDROCHLORIDE SCH MG: 100 TABLET ORAL at 09:50

## 2019-06-11 RX ADMIN — SUCRALFATE SCH GM: 1 TABLET ORAL at 06:29

## 2019-06-11 RX ADMIN — SUCRALFATE SCH GM: 1 TABLET ORAL at 11:54

## 2019-06-11 RX ADMIN — BUDESONIDE AND FORMOTEROL FUMARATE DIHYDRATE SCH PUFF: 80; 4.5 AEROSOL RESPIRATORY (INHALATION) at 09:57

## 2019-06-11 RX ADMIN — LEVOTHYROXINE SODIUM SCH MCG: 75 TABLET ORAL at 06:29

## 2019-06-11 NOTE — P.DS
Providers


Date of admission: 


06/08/19 21:06





Attending physician: 


Ashlyn Forte





Consults: 





                                        





06/08/19 20:59


Consult Physician Routine 


   Consulting Provider: Agusto Kaminski


   Consult Reason/Comments: GI bleed


   Do you want consulting provider notified?: Yes











Primary care physician: 


Ky Michelle MD





Hospital Course: 





Diagnoses:


Acute non-bleeding intestinal ulcer below the gastrojejunostomy point


Blood per rectum, secondary to above


Acute blood loss anemia, secondary to above


History of asthma, not an active issue


History of fibromyalgia


History of GERD


Hyperlipidemia


Hypertension


History of posterior arthritis








Hospital course:


This is a pleasant 48 years old female with past medical history of asthma, 

fibromyalgia, GERD, hyperlipidemia, hypertension, osteoarthritis.  Presents with

bloody bowel movements with nausea and vomiting.  Also she had some right upper 

quadrant and epigastric tenderness.  Patient has been using NSAIDs frequently 

for her recent nasal bone fracture.  Surgery team evaluated the patient.  She 

had EGD on 06/10/2019 by the surgical team showing: Non-bleeding intestinal 

ulcer below the gastrojejunostomy.  Patient was treated with Protonix, 

sucralfate was added.  Also patient was started on iron pills and her hemoglobin

is stable at 8.6.  Patient feels better and back to her usual state with no 

abdominal pain or nausea vomiting.  Patient is tolerating diet well.  She had n

ormal bowel movement with no blood in it.  Patient was counseled against using 

more NSAIDs.  Also I counseled the patient extensively for the need to follow-up

with her biopsy results.  She said she is going to do that and she agrees with 

the appointments made with the surgery team and its timing, stating that she 

will follow-up.  Risks including but not limited to cancer, infection, versus 

others are explained to the patient and she verbalized understanding and 

acceptance.


Patient thinks she is back to her usual state and she wants to go home.  Case 

was discussed with the surgical team and they agree with discharge and the 

patient today.  Patient will be discharged on PPI on Carafate.


Problems and management plan were discussed with the patient and he verbalized 

understanding and acceptance


Patient was found stable and can be discharged home however he needs follow-up 

as an outpatient.  Patient agrees with the appointments and the timing made with

her PCP and surgery team and stated she will follow-up.








Gen: patient is a AAOx3, no distress


CVS: S1-S2, RRR, no murmur


Lungs: B/L CTA, no wheezing


Abdomen: soft, no distention, no tenderness, positive bowel sounds


Extremity: no leg edema or induration





Time spent more than 35 minutes


Patient Condition at Discharge: Fair





Plan - Discharge Summary


New Discharge Prescriptions: 


New


   Sucralfate [Carafate] 1 gm PO TID #90 tablet


   Omeprazole 40 mg PO DAILY #30 cap





Discontinued


   Omeprazole 20 mg PO DAILY





No Action


   Levothyroxine Sodium [Synthroid] 150 mcg PO DAILY


   Losartan-Hctz 50-12.5 mg [Hyzaar 50-12.5] 1 tab PO DAILY


   Cetirizine HCl [Zyrtec] 10 mg PO DAILY


   Sertraline HCl [Zoloft] 100 mg PO DAILY


   Fluticasone/Salmeterol [Advair 100-50 Diskus] 1 inhalation PO RT-Q12H


   Albuterol Inhaler [Ventolin Hfa Inhaler] 1 - 2 puff INHALATION RT-Q6H PRN


     PRN Reason: Shortness Of Breath


Discharge Medication List





Levothyroxine Sodium [Synthroid] 150 mcg PO DAILY 02/27/15 [History]


Losartan-Hctz 50-12.5 mg [Hyzaar 50-12.5] 1 tab PO DAILY 04/12/17 [History]


Cetirizine HCl [Zyrtec] 10 mg PO DAILY 05/22/18 [History]


Albuterol Inhaler [Ventolin Hfa Inhaler] 1 - 2 puff INHALATION RT-Q6H PRN 

06/08/19 [History]


Fluticasone/Salmeterol [Advair 100-50 Diskus] 1 inhalation PO RT-Q12H 06/08/19 

[History]


Sertraline HCl [Zoloft] 100 mg PO DAILY 06/08/19 [History]


Omeprazole 40 mg PO DAILY #30 cap 06/10/19 [Rx]


Sucralfate [Carafate] 1 gm PO TID #90 tablet 06/10/19 [Rx]








Follow up Appointment(s)/Referral(s): 


Ky Michelle MD [Primary Care Provider] - 06/20/19 10:15 am


Agusto Kaminski MD [STAFF PHYSICIAN] - 06/20/19 1:45 pm (Dr. Kaminski only in of

fice Tuesdays and Thursdays. No morning appointments available.)


Patient Instructions/Handouts:  Diet for Stomach Ulcers and Gastritis (ED)


Activity/Diet/Wound Care/Special Instructions: 


Avoid all NSAIDS like advil/ibuprofen/motrin and aleve/naproxen - these increase

your bleeding risk.

## 2019-06-11 NOTE — P.PN
Subjective


Progress Note Date: 06/11/19





CHIEF COMPLAINT: GI bleed





HISTORY OF PRESENT ILLNESS: Patient is status post EGD revealing a marginal 

ulcer at gastrojejunostomy.  No further bleeding noted.  Patient's hemoglobin is

stable.  She is tolerating diet.





PHYSICAL EXAM: 


VITAL SIGNS: Reviewed.


GENERAL: Well-developed in no acute distress. 


HEENT:  No sclera icterus. Extraocular movements grossly intact.  Moist buccal 

mucosa. Head is atraumatic, normocephalic. 


ABDOMEN:  Soft.  Nondistended. Nontender. 


NEUROLOGIC: Alert and oriented. Cranial nerves II through XII grossly intact.





ASSESSMENT: 


1.  Acute GI bleed





PLAN: 


 Patient is stable for discharge home today.  Patient to remain on Carafate and 

omeprazole at time of discharge.  Follow-up with Dr. Kaminski outpatient.





Nurse practitioner note has been reviewed by physician. Signing provider agrees 

with the documented findings, assessment, and plan of care. 








Objective





- Vital Signs


Vital signs: 


                                   Vital Signs











Temp  97.9 F   06/11/19 11:59


 


Pulse  79   06/11/19 11:59


 


Resp  18   06/11/19 11:59


 


BP  130/66   06/11/19 11:59


 


Pulse Ox  97   06/11/19 11:59








                                 Intake & Output











 06/10/19 06/11/19 06/11/19





 18:59 06:59 18:59


 


Intake Total 290 620 118


 


Balance 290 620 118


 


Weight  82.3 kg 


 


Intake:   


 


  IV 50  


 


  Oral 240 620 118


 


Other:   


 


  # Voids  1 














- Labs


CBC & Chem 7: 


                                 06/11/19 07:02





                                 06/10/19 06:44


Labs: 


                  Abnormal Lab Results - Last 24 Hours (Table)











  06/11/19 Range/Units





  07:02 


 


RBC  3.32 L  (3.80-5.40)  m/uL


 


Hgb  8.6 L  (11.4-16.0)  gm/dL


 


Hct  27.2 L  (34.0-46.0)  %

## 2020-07-08 ENCOUNTER — HOSPITAL ENCOUNTER (OUTPATIENT)
Dept: HOSPITAL 47 - LABPAT | Age: 50
Discharge: HOME | End: 2020-07-08
Attending: ORTHOPAEDIC SURGERY
Payer: COMMERCIAL

## 2020-07-08 DIAGNOSIS — Z01.812: ICD-10-CM

## 2020-07-08 DIAGNOSIS — Z01.818: Primary | ICD-10-CM

## 2020-07-08 LAB
ALBUMIN SERPL-MCNC: 4.1 G/DL (ref 3.5–5)
ALP SERPL-CCNC: 64 U/L (ref 38–126)
ALT SERPL-CCNC: 17 U/L (ref 4–34)
ANION GAP SERPL CALC-SCNC: 7 MMOL/L
APTT BLD: 22.8 SEC (ref 22–30)
AST SERPL-CCNC: 33 U/L (ref 14–36)
BUN SERPL-SCNC: 19 MG/DL (ref 7–17)
CALCIUM SPEC-MCNC: 9.3 MG/DL (ref 8.4–10.2)
CHLORIDE SERPL-SCNC: 105 MMOL/L (ref 98–107)
CO2 SERPL-SCNC: 25 MMOL/L (ref 22–30)
ERYTHROCYTE [DISTWIDTH] IN BLOOD BY AUTOMATED COUNT: 4.34 M/UL (ref 3.8–5.4)
ERYTHROCYTE [DISTWIDTH] IN BLOOD: 15.4 % (ref 11.5–15.5)
GLUCOSE SERPL-MCNC: 95 MG/DL (ref 74–99)
HCT VFR BLD AUTO: 36.6 % (ref 34–46)
HGB BLD-MCNC: 11.7 GM/DL (ref 11.4–16)
INR PPP: 0.9 (ref ?–1.2)
MCH RBC QN AUTO: 26.9 PG (ref 25–35)
MCHC RBC AUTO-ENTMCNC: 32 G/DL (ref 31–37)
MCV RBC AUTO: 84.2 FL (ref 80–100)
PLATELET # BLD AUTO: 288 K/UL (ref 150–450)
POTASSIUM SERPL-SCNC: 3.8 MMOL/L (ref 3.5–5.1)
PROT SERPL-MCNC: 7 G/DL (ref 6.3–8.2)
PT BLD: 9.5 SEC (ref 9–12)
SODIUM SERPL-SCNC: 137 MMOL/L (ref 137–145)
WBC # BLD AUTO: 5.9 K/UL (ref 3.8–10.6)

## 2020-07-08 PROCEDURE — 36415 COLL VENOUS BLD VENIPUNCTURE: CPT

## 2020-07-08 PROCEDURE — 85730 THROMBOPLASTIN TIME PARTIAL: CPT

## 2020-07-08 PROCEDURE — 85610 PROTHROMBIN TIME: CPT

## 2020-07-08 PROCEDURE — 80053 COMPREHEN METABOLIC PANEL: CPT

## 2020-07-08 PROCEDURE — 87070 CULTURE OTHR SPECIMN AEROBIC: CPT

## 2020-07-08 PROCEDURE — 85027 COMPLETE CBC AUTOMATED: CPT

## 2020-07-16 ENCOUNTER — HOSPITAL ENCOUNTER (OUTPATIENT)
Dept: HOSPITAL 47 - OR | Age: 50
Setting detail: OBSERVATION
LOS: 3 days | Discharge: HOME HEALTH | End: 2020-07-19
Attending: ORTHOPAEDIC SURGERY | Admitting: ORTHOPAEDIC SURGERY
Payer: COMMERCIAL

## 2020-07-16 VITALS — BODY MASS INDEX: 32.8 KG/M2

## 2020-07-16 DIAGNOSIS — Z87.39: ICD-10-CM

## 2020-07-16 DIAGNOSIS — M62.838: ICD-10-CM

## 2020-07-16 DIAGNOSIS — Z87.11: ICD-10-CM

## 2020-07-16 DIAGNOSIS — I10: ICD-10-CM

## 2020-07-16 DIAGNOSIS — Z88.0: ICD-10-CM

## 2020-07-16 DIAGNOSIS — M17.12: Primary | ICD-10-CM

## 2020-07-16 DIAGNOSIS — Z79.51: ICD-10-CM

## 2020-07-16 DIAGNOSIS — Z90.710: ICD-10-CM

## 2020-07-16 DIAGNOSIS — M79.7: ICD-10-CM

## 2020-07-16 DIAGNOSIS — Z87.81: ICD-10-CM

## 2020-07-16 DIAGNOSIS — Z91.81: ICD-10-CM

## 2020-07-16 DIAGNOSIS — E89.0: ICD-10-CM

## 2020-07-16 DIAGNOSIS — Z80.9: ICD-10-CM

## 2020-07-16 DIAGNOSIS — K21.9: ICD-10-CM

## 2020-07-16 DIAGNOSIS — G47.33: ICD-10-CM

## 2020-07-16 DIAGNOSIS — L40.9: ICD-10-CM

## 2020-07-16 DIAGNOSIS — Z87.898: ICD-10-CM

## 2020-07-16 DIAGNOSIS — Z87.19: ICD-10-CM

## 2020-07-16 DIAGNOSIS — Z82.49: ICD-10-CM

## 2020-07-16 DIAGNOSIS — M51.9: ICD-10-CM

## 2020-07-16 DIAGNOSIS — Z98.890: ICD-10-CM

## 2020-07-16 DIAGNOSIS — E78.5: ICD-10-CM

## 2020-07-16 DIAGNOSIS — Z80.3: ICD-10-CM

## 2020-07-16 DIAGNOSIS — Z79.890: ICD-10-CM

## 2020-07-16 DIAGNOSIS — J45.909: ICD-10-CM

## 2020-07-16 DIAGNOSIS — Z84.89: ICD-10-CM

## 2020-07-16 DIAGNOSIS — Z90.49: ICD-10-CM

## 2020-07-16 DIAGNOSIS — Z79.899: ICD-10-CM

## 2020-07-16 DIAGNOSIS — Z98.84: ICD-10-CM

## 2020-07-16 DIAGNOSIS — Z91.89: ICD-10-CM

## 2020-07-16 DIAGNOSIS — Z98.51: ICD-10-CM

## 2020-07-16 DIAGNOSIS — Z82.3: ICD-10-CM

## 2020-07-16 DIAGNOSIS — Z87.820: ICD-10-CM

## 2020-07-16 DIAGNOSIS — Z97.3: ICD-10-CM

## 2020-07-16 DIAGNOSIS — M25.762: ICD-10-CM

## 2020-07-16 DIAGNOSIS — Z83.2: ICD-10-CM

## 2020-07-16 DIAGNOSIS — K76.0: ICD-10-CM

## 2020-07-16 DIAGNOSIS — Z79.891: ICD-10-CM

## 2020-07-16 DIAGNOSIS — M71.22: ICD-10-CM

## 2020-07-16 DIAGNOSIS — F41.8: ICD-10-CM

## 2020-07-16 PROCEDURE — 76942 ECHO GUIDE FOR BIOPSY: CPT

## 2020-07-16 PROCEDURE — 93971 EXTREMITY STUDY: CPT

## 2020-07-16 PROCEDURE — 94640 AIRWAY INHALATION TREATMENT: CPT

## 2020-07-16 PROCEDURE — 27447 TOTAL KNEE ARTHROPLASTY: CPT

## 2020-07-16 PROCEDURE — 73560 X-RAY EXAM OF KNEE 1 OR 2: CPT

## 2020-07-16 PROCEDURE — 85025 COMPLETE CBC W/AUTO DIFF WBC: CPT

## 2020-07-16 PROCEDURE — 97116 GAIT TRAINING THERAPY: CPT

## 2020-07-16 PROCEDURE — 97110 THERAPEUTIC EXERCISES: CPT

## 2020-07-16 PROCEDURE — 97161 PT EVAL LOW COMPLEX 20 MIN: CPT

## 2020-07-16 PROCEDURE — 88300 SURGICAL PATH GROSS: CPT

## 2020-07-16 PROCEDURE — 64448 NJX AA&/STRD FEM NRV NFS IMG: CPT

## 2020-07-16 RX ADMIN — HYDROCODONE BITARTRATE AND ACETAMINOPHEN PRN EACH: 10; 325 TABLET ORAL at 19:43

## 2020-07-16 RX ADMIN — ASPIRIN 81 MG CHEWABLE TABLET SCH MG: 81 TABLET CHEWABLE at 19:31

## 2020-07-16 RX ADMIN — BUDESONIDE AND FORMOTEROL FUMARATE DIHYDRATE SCH: 80; 4.5 AEROSOL RESPIRATORY (INHALATION) at 19:36

## 2020-07-16 RX ADMIN — HYDROMORPHONE HYDROCHLORIDE PRN MG: 1 INJECTION, SOLUTION INTRAMUSCULAR; INTRAVENOUS; SUBCUTANEOUS at 18:35

## 2020-07-16 RX ADMIN — HYDROMORPHONE HYDROCHLORIDE PRN MG: 1 INJECTION, SOLUTION INTRAMUSCULAR; INTRAVENOUS; SUBCUTANEOUS at 23:07

## 2020-07-16 RX ADMIN — POTASSIUM CHLORIDE SCH: 14.9 INJECTION, SOLUTION INTRAVENOUS at 21:11

## 2020-07-16 RX ADMIN — POTASSIUM CHLORIDE SCH MLS: 14.9 INJECTION, SOLUTION INTRAVENOUS at 08:42

## 2020-07-16 RX ADMIN — SODIUM CHLORIDE ONE MG: 9 INJECTION, SOLUTION INTRAVENOUS at 10:27

## 2020-07-16 RX ADMIN — SODIUM CHLORIDE ONE MG: 9 INJECTION, SOLUTION INTRAVENOUS at 09:50

## 2020-07-16 RX ADMIN — DOCUSATE SODIUM AND SENNOSIDES SCH EACH: 50; 8.6 TABLET ORAL at 19:31

## 2020-07-16 RX ADMIN — HYDROMORPHONE HYDROCHLORIDE PRN MG: 1 INJECTION, SOLUTION INTRAMUSCULAR; INTRAVENOUS; SUBCUTANEOUS at 16:40

## 2020-07-16 RX ADMIN — POTASSIUM CHLORIDE SCH: 14.9 INJECTION, SOLUTION INTRAVENOUS at 13:33

## 2020-07-16 NOTE — P.ANPRN
Procedure Note - Anesthesia





- Nerve Block Performed


  ** Left Adductor Canal Infusion


Time Out Performed: Yes


Date of Procedure: 07/16/20


Procedure Start Time: 08:30


Procedure Stop Time: 08:42


Location of Patient: PreOp


Indication: Acute Post-Operative Pain, Requested by Surgeon


Sedation Type: Sedate with meaningful contact maintained


Preparation: Sterile Prep, Sterile Dressing


Position: Supine


Catheter: Indwelling


Needle Types: [employer]


Needle Gauge: 18


Ultrasound used to visualize needle placement: Yes


Ultrasound used to observe medication spread: Yes


Injectate: 0.5% Ropivacaine (see comment for volume) (20 ml + decadron 4 mg)


Blood Aspirated: No


Pain Paresthesia on Injection Noted: No


Resistance on Injection: Normal


Image Stored and Saved: Yes


Events: Uneventful and Well Tolerated

## 2020-07-16 NOTE — XR
Left knee

 

HISTORY: Status post left knee arthroplasty

 

2 views of the left knee

 

Patient is status post left knee arthroplasty. There is anatomic alignment. Lucency is present in the
 soft tissues.

 

IMPRESSION: Orthopedic follow-up

## 2020-07-16 NOTE — P.CONS
History of Present Illness





- Reason for Consult


Recommendations regarding antidepressive medications.





- History of Present Illness


Patient very pleasant 49-year-old female admitted for a left Breast CA sepsis N 

surgery directed did not pass gas did not move her bowel yet.  Patient is 

complaining of significant pain in the left knee surgical site area.  Patient 

doesn't have a Johnson catheter.  Does have surgical drainage at this time.








Review of Systems








REVIEW OF SYSTEMS: 


CONSTITUTIONAL: No fever, no malaise, no fatigue. 


HEENT: No recent visual problems or hearing problems. Denied any sore throat. 


CARDIOVASCULAR: No chest pain, orthopnea, PND, no palpitations, no syncope. 


PULMONARY: No shortness of breath, no cough, no hemoptysis. 


GASTROINTESTINAL: No diarrhea, no nausea, no vomiting, no abdominal pain. 


NEUROLOGICAL: No headaches, no weakness, no numbness. 


HEMATOLOGICAL: Denies any bleeding or petechiae. 


GENITOURINARY: Denies any burning micturition, frequency, or urgency. 


MUSCULOSKELETAL/RHEUMATOLOGICAL: As mentioned in HPI


ENDOCRINE: Denies any polyuria or polydipsia. 





The rest of the 14-point review of systems is negative.








Past Medical History


Past Medical History: Asthma, Chest Pain / Angina, Fibromyalgia, GERD/Reflux, GI

Bleed, Hypertension, Osteoarthritis (OA), Thyroid Disorder


Additional Past Medical History / Comment(s): DDD WITH BACK PAIN, hx. of FATTY 

LIVER. ,TORN LT ROTATOR CUFF 2016, tripped & fell last year, hit face, had brain

bleed, ended up w/GI bleed after taking motrin, fell recently & tore left knee 

meniscus.


History of Any Multi-Drug Resistant Organisms: None Reported


Past Surgical History: Appendectomy, Bariatric Surgery, Bladder Surgery, 

Cholecystectomy, Heart Catheterization, Hysterectomy, Tubal Ligation


Additional Past Surgical History / Comment(s): partial thyroidectomy, bladder 

suspension w/mesh (as of 18 patient has since undergone sx 4x to have 

various portions of mesh removed), sinus repair surgery, bilateral knee surgery 

(meniscus, femur fracture on rt) 10-31-16 MILO EN Y GASTRIC BYPASS, EGD, 

COLONOSCOPY, panniculectomy 18


Past Anesthesia/Blood Transfusion Reactions: Family History of Problems w/ 

Anesthesia, Motion Sickness


Additional Past Anesthesia/Blood Transfusion Reaction / Comm: Pt has difficulty 

waking up, difficult intubation.  Pts grandmother has difficulty waking and also

difficult intubation.


Past Psychological History: Anxiety, Depression


Smoking Status: Never smoker


Past Alcohol Use History: Rare


Past Drug Use History: None Reported





- Past Family History


  ** Mother


Family Medical History: Cancer


Additional Family Medical History / Comment(s): Breast CA





  ** Father


Family Medical History: Cancer, CVA/TIA


Additional Family Medical History / Comment(s): - stroke





Medications and Allergies


                                Home Medications











 Medication  Instructions  Recorded  Confirmed  Type


 


Levothyroxine Sodium [Synthroid] 150 mcg PO DAILY 02/27/15 07/14/20 History


 


Losartan-Hctz 50-12.5 mg [Hyzaar 1 tab PO DAILY 17 History





50-12.5]    


 


Cetirizine HCl [Zyrtec] 10 mg PO DAILY 18 History


 


Albuterol Inhaler (Mhu) [Ventolin 1 - 2 puff INHALATION RT-Q6H PRN 19 History





Hfa Inhaler (Mhu)]    


 


Fluticasone/Salmeterol [Advair 1 inhalation PO RT-Q12H 19 History





100-50 Diskus]    


 


Sertraline HCl [Zoloft] 150 mg PO DAILY 19 History


 


Omeprazole 40 mg PO DAILY #30 cap 06/10/19 07/14/20 Rx


 


Acetaminophen Tab [Tylenol] 500 mg PO Q6HR PRN  tab 19 Rx


 


Norco(Unknown Dose) 1 tab PO Q6H PRN 20 History


 


Trazodone(Unknown Dose) 1 tab PO HS 20 History








                                    Allergies











Allergy/AdvReac Type Severity Reaction Status Date / Time


 


Penicillins Allergy  Unknown Verified 20 08:06














Physical Exam


Vitals: 


                                   Vital Signs











  Temp Pulse Resp BP Pulse Ox


 


 20 13:50     114/63 


 


 20 13:08  98.2 F  70  16  120/80  93 L


 


 20 12:49   64  16  108/61  93 L


 


 20 12:35   62   111/63  94 L


 


 20 12:15   65  16  111/65  94 L


 


 20 12:00   63  18  108/66  94 L


 


 20 11:45   60  16  134/79  94 L


 


 20 11:30   65  17  146/80  96


 


 20 11:15   64  14  129/68  95


 


 20 11:03  97.7 F  78  16  134/89  96


 


 20 08:57   63  16  128/79  99


 


 20 08:02  98.1 F  77  16  156/76  97








                                Intake and Output











 20





 06:59 14:59 22:59


 


Intake Total  951 


 


Output Total  100 


 


Balance  851 


 


Intake:   


 


  IV  951 


 


Output:   


 


  Estimated Blood Loss  100 


 


Other:   


 


  # Voids  1 


 


  Weight  85.5 kg 

















PHYSICAL EXAMINATION: 





GENERAL: The patient is alert and oriented x3, not in any acute distress. Well 

developed, well nourished. 


HEENT: Pupils are round and equally reacting to light. EOMI. No scleral icterus.

 No conjunctival pallor. Normocephalic, atraumatic. No pharyngeal erythema. No 

thyromegaly. 


CARDIOVASCULAR: S1 and S2 present. No murmurs, rubs, or gallops. 


PULMONARY: Chest is clear to auscultation, no wheezing or crackles. 


ABDOMEN: Soft, nontender, nondistended, normoactive bowel sounds. No palpable 

organomegaly. 


MUSCULOSKELETAL: No joint swelling or deformity.


EXTREMITIES: No cyanosis, clubbing, or pedal edema.  Left knee wrapped with Ace 

bandages


NEUROLOGICAL: Gross neurological examination did not reveal any focal deficits. 


SKIN: No rashes. 

















Assessment and Plan


Plan: 


-Hypertension to avoid perioperative hypotension on hold off on losartan and 

hydrochlorothiazide.


-Left knee arthroplasty postoperatively patient is an 81 mg aspirin twice a day 

for DVT prophylaxis pain management as per primary service


-Esophageal reflux disease


-Osteoarthritis


-Hypothyroidism


-Fibromyalgia





For above-mentioned chronic visual problems patient will be resumed on 

appropriate home medications

## 2020-07-16 NOTE — OP
OPERATIVE REPORT



DATE OF PROCEDURE:

07/16/2020.



SURGEON:

Howard Phipps MD.



ASSISTANT:

Willy Bautista PA-C.



PREOPERATIVE DIAGNOSIS:

Left knee osteoarthrosis.



POSTOPERATIVE DIAGNOSIS:

Left knee osteoarthrosis.



OPERATION:

Left total knee arthroplasty.



ANESTHESIA:

Spinal with sedation.



ESTIMATED BLOOD LOSS:

100 mL.



TOURNIQUET:

Tourniquet time was 40 minutes at 250 mmHg.



COMPLICATIONS:

None apparent.



DRAINS:

None.



DISPOSITION:

Post-Anesthesia Care Unit.



INDICATIONS:

Jess is a 49-year-old female with longstanding history of left knee pain.  History

and physical examination are consistent with advanced left knee osteoarthrosis.  She

has been through significant nonoperative management up to this point.  Further

treatment options were discussed, and she decided to go forward with left total knee

arthroplasty.



The risks of the procedure were discussed with her in detail.  These risks include but

are not limited to risk of infection, nerve damage, bleeding, pain, and a small risk of

deep vein thrombosis which could lead to fatal pulmonary embolism.  There is also a

risk of loosening of the implant which could require revision operation.  The patient

understands these risks.  All of her questions were answered to her satisfaction.

Appropriate informed consent was obtained.



DESCRIPTION OF THE PROCEDURE:

The patient was identified in the preoperative holding area.  Surgical site was marked

by both the patient and myself.  She was given 2 grams of Ancef IV for prophylactic

purposes.  She was then transferred to the operative suite. She was placed supine on

the operating room table.  A spinal anesthetic was then administered and dosed per the

anesthesia department without apparent complication.



Examination under anesthesia was then performed.  The patient was 2 to 3 degrees shy of

full extension.  She had 100 degrees of flexion, and the medial collateral ligament,

lateral collateral ligament and posterior cruciate ligaments were stable.



Tourniquet was then placed high on the left upper thigh, well padded in preparation for

surgery. The patient's left lower extremity was then prepped and draped in the usual

sterile fashion.  A standard surgical pause was then undertaken to ensure that we were

operating on the correct site and that appropriate preoperative antibiotics had been

given.  All staff in the room were in agreement and we proceeded.



The outlines of the patella were marked with a surgical pen.  A planned 12 cm vertical

incision centered over the patella was marked with a surgical pen.  The leg was then

exsanguinated with an Esmarch dressing.  The knee was then flexed and the tourniquet

was inflated to 250 mmHg.  The total tourniquet time for the procedure was 40 minutes.



Incision was then made with a 10-blade scalpel.  Dissection was carried down sharply to

the overlying fascia.  Great care was taken to minimize the skin flaps.  The knee was

then exposed using a standard medial parapatellar approach.  A small cuff of quadriceps

tendon was then left for suturing.



She was in quite a bit of varus preoperatively.  A standard medial release was then

made.  Superficial medial collateral ligament was dissected off of the bone and around

to the posterior aspect of the proximal tibia.  The medial meniscus was then excised as

well.  The lateral meniscus was also released anteriorly.



The leg was then externally rotated.  The patella was everted.  The knee was flexed.

The retractors were then placed to protect the collateral ligaments.



I then proceeded to remove the infrapatellar fat pad.  This was excised sharply

tangentially with the fibers of the patellar tendon.



I then proceeded to remove peripheral osteophytes.  This was done with a rongeur.  I

then proceeded with the distal femoral resection.  She did have near-full extension. A

planned 9 mm resection was then done.  The femoral canal was then entered in the

midline of the femur approximately 10 mm anterior to the origin of the posterior

cruciate ligament.  The mackenzie was then advanced down to the center of the femur and

placed intramedullary.



Based on the preoperative radiographs, the angle between the anatomic and mechanical

axis of the femur was approximately 4-5 degrees.  The valgus angle of the distal

femoral cutting guide was then set at 4 degrees for the left knee.  The distal femoral

cutting guide was then advanced over the intramedullary mackenzie.  This was seated firmly

against the femur.  I then, as mentioned, planned to take 9 mm off the distal femur.



The cutting block was then secured onto the femur with pins.  The jig was removed and

the distal femoral cut was made through the slot of the block.  The pins were then

removed and the distal femoral cutting block was removed.  The accuracy of the distal

femoral cuts was checked with 2 flat bars.



I then proceeded with femoral sizing.  The posterior referencing sizing guide was held

firmly against the resected distal surface of the femur.  The posterior condyles were

resting on the posterior plane of the guide.  The sizing stylus was then placed onto

the anterior femur.  The size was measured as a size 7.



I then assessed for femoral rotation.  The plan was for 3 degrees of external rotation.

Three degrees of external rotation was placed onto the jig.  These holes were then

marked.  I then confirmed the rotation by 3 separate methods.  This was done using the

epicondylar axis as well as Whitesides line and posterior referencing.  It was deemed

that the external rotation was proper.  I then went forward with placing the femoral

cutting block.  This was placed over the previously placed pin holes.  The Edmundo wing

was then placed onto the anterior slots to ensure that we would not notch the anterior

femur with the anterior femoral cut.  I then proceeded with the anterior femoral cut.

This was flush with the anterior cortex of the femur.  Posterior cuts were then made

followed by the anterior chamfer cut, and then the posterior chamfer cut.  The cutting

block was then removed.  Throughout the resection, the collateral ligaments were

protected with retractors.  I then placed a trial size 7 femur.  It was slightly wide

medial to lateral, but the narrow fit nicely and it also fit flush with the distal end

of the femur.  The drill holes were then made.



I then proceeded with the tibial cut.  I planned for a cruciate-retaining knee.  The

guide was placed and set for varus, valgus and for slope.  The height was set for an

approximate 2 mm resection from the medial tibial plateau, which was the lower side. I

was happy with the alignment and the amount of resection.  The cutting block was then

pinned to the proximal tibia.  The alignment mackenzie was removed and the proximal tibia was

resected with a reciprocating saw.  Again this was done with retractors protecting the

collateral ligaments as well as the posterior cruciate ligament.



I then proceeded to evaluate the flexion and extension gaps.  A 10 mm block was then

placed.  The flexion and extension gaps were equal.



I then proceeded with resection of the posterior osteophytes.  She had very minimal

posterior osteophytes.  This was done using a curved osteotome.  This resected the

posterior osteophytes, and posterior capsule stripping was done off the posterior

aspect of the femur at this time.  The osteophytes were then removed.



I then proceeded with resection of the patella.  The thickness of the patella was

measured using the caliper.  The thickness was 22 mm.  Thickness of the anticipated

patellar dome was taken into account.  Resection was then performed and confirmed to be

equal in 4 quadrants using a caliper.  Approximately 14 mm of bone remained after

resection.  A 28 x 8 standard patellar trial was then placed.  The holes were drilled

and the trial was then placed.



I then proceeded with sizing the tibial plate.  A size D tibial plate fit very nicely.

I then placed the trial femur and the tibial tray and the patellar button.  A 10 mm

trial tibial insert was also placed.  The components fit very nicely.  She had full

extension and flexion.  The extension and flexion gaps were equal and stable to both

varus and valgus stress.  The patella tracked appropriately.



The tibial tray rotation was then marked with a Bovie.  This was externally rotated

properly.



I then proceeded with tibial preparation. I first drilled the femoral holes and removed

the femoral component.  The tibial tray was then set for proper external rotation as

well as mediolateral placement onto the tibia.  It was then pinned into place.  I then

proceeded with punching the keel.



I then decided to proceed with cementing of all of our components.  The knee was

thoroughly irrigated with sterile saline solution via pulse lavage.  The lateral

geniculate artery was identified and cauterized.  All blood was removed from the bone

of the tibia, femur and patella with pulse lavage.  I then proceeded with cementing.



Two packs of antibiotic bone cement were prepared on the back table by the surgical

tech.  I then proceeded with cementing of the tibia first.  The cement was impacted

into the keel as well as deeply seated into the bone.  A second coat of cement was then

placed.  The tibia was then impacted into place.  Excess cement was removed with

Cushing's and jokers.



I then proceeded with cementing of the femoral component.  The femoral component was

also cemented using standard technique.  Excess cement was removed.  A 10 mm insert was

then placed into the knee.  It was brought into full extension with a constant axial

load placed until the cement had hardened.



The patellar component was then cemented.  This was held firmly with a compressive

device until the cement had dried.



When the cement had dried, the knee was taken out of extension.  All excess cement was

removed from around the prosthesis.  I then trialed with a 10 mm insert and proceeding

to trial with up to a 14 mm insert.  The flexion and extension gaps felt much better.

The knee was stable.  It came into full extension.  I decided to go forward with a 14

mm cross-linked cruciate-retaining tibial insert.



Polyethylene was then placed onto the tibial tray and locked into place.  The knee was

then reduced.  The knee was again further irrigated with sterile saline solution with

antibiotic added.  The tourniquet was then deflated.  The total tourniquet time for the

procedure was 40 minutes at 250 mmHg. Final components were a Landy Persona size 7

narrow cruciate-retaining femoral component, a size D tibial tray, a 14 mm medial-

congruent cruciate-retaining polyethylene insert, and a 29 x 8 mm patella.



I then proceeded with closure.  Again the knee was thoroughly irrigated.  The

quadriceps tendon and the medial retinaculum were reapproximated with #2 Ethibond

suture.  The extensor mechanism was then closed with a running #2 Quill suture.



Subcutaneous tissues were then closed with 2-0 Vicryl interrupted suture.  The skin was

closed with a running 3-0 Quill suture.  Dermabond was applied to the incision.



All sponge and needle counts were deemed correct prior to closure.  The patient

tolerated the procedure without apparent complication.  She was transferred to the

recovery room in stable condition.





MMODL / IJN: [id number] / Job#: [id number]

## 2020-07-17 LAB
BASOPHILS # BLD AUTO: 0 K/UL (ref 0–0.2)
BASOPHILS NFR BLD AUTO: 0 %
EOSINOPHIL # BLD AUTO: 0.1 K/UL (ref 0–0.7)
EOSINOPHIL NFR BLD AUTO: 1 %
ERYTHROCYTE [DISTWIDTH] IN BLOOD BY AUTOMATED COUNT: 3.66 M/UL (ref 3.8–5.4)
ERYTHROCYTE [DISTWIDTH] IN BLOOD: 15.9 % (ref 11.5–15.5)
HCT VFR BLD AUTO: 30.3 % (ref 34–46)
HGB BLD-MCNC: 9.5 GM/DL (ref 11.4–16)
LYMPHOCYTES # SPEC AUTO: 1 K/UL (ref 1–4.8)
LYMPHOCYTES NFR SPEC AUTO: 19 %
MCH RBC QN AUTO: 26 PG (ref 25–35)
MCHC RBC AUTO-ENTMCNC: 31.4 G/DL (ref 31–37)
MCV RBC AUTO: 82.9 FL (ref 80–100)
MONOCYTES # BLD AUTO: 0.4 K/UL (ref 0–1)
MONOCYTES NFR BLD AUTO: 8 %
NEUTROPHILS # BLD AUTO: 3.6 K/UL (ref 1.3–7.7)
NEUTROPHILS NFR BLD AUTO: 71 %
PLATELET # BLD AUTO: 199 K/UL (ref 150–450)
WBC # BLD AUTO: 5.1 K/UL (ref 3.8–10.6)

## 2020-07-17 RX ADMIN — HYDROMORPHONE HYDROCHLORIDE PRN MG: 1 INJECTION, SOLUTION INTRAMUSCULAR; INTRAVENOUS; SUBCUTANEOUS at 19:50

## 2020-07-17 RX ADMIN — HYDROCODONE BITARTRATE AND ACETAMINOPHEN PRN EACH: 10; 325 TABLET ORAL at 22:14

## 2020-07-17 RX ADMIN — HYDROMORPHONE HYDROCHLORIDE PRN MG: 1 INJECTION, SOLUTION INTRAMUSCULAR; INTRAVENOUS; SUBCUTANEOUS at 14:55

## 2020-07-17 RX ADMIN — DOCUSATE SODIUM AND SENNOSIDES SCH EACH: 50; 8.6 TABLET ORAL at 21:58

## 2020-07-17 RX ADMIN — POTASSIUM CHLORIDE SCH: 14.9 INJECTION, SOLUTION INTRAVENOUS at 09:03

## 2020-07-17 RX ADMIN — HYDROCODONE BITARTRATE AND ACETAMINOPHEN PRN EACH: 10; 325 TABLET ORAL at 15:46

## 2020-07-17 RX ADMIN — HYDROMORPHONE HYDROCHLORIDE PRN MG: 1 INJECTION, SOLUTION INTRAMUSCULAR; INTRAVENOUS; SUBCUTANEOUS at 11:22

## 2020-07-17 RX ADMIN — THERA TABS SCH EACH: TAB at 11:21

## 2020-07-17 RX ADMIN — POTASSIUM CHLORIDE SCH MLS/HR: 14.9 INJECTION, SOLUTION INTRAVENOUS at 19:51

## 2020-07-17 RX ADMIN — HYDROCODONE BITARTRATE AND ACETAMINOPHEN PRN EACH: 10; 325 TABLET ORAL at 07:30

## 2020-07-17 RX ADMIN — BUDESONIDE AND FORMOTEROL FUMARATE DIHYDRATE SCH PUFF: 80; 4.5 AEROSOL RESPIRATORY (INHALATION) at 08:33

## 2020-07-17 RX ADMIN — HYDROMORPHONE HYDROCHLORIDE PRN MG: 1 INJECTION, SOLUTION INTRAMUSCULAR; INTRAVENOUS; SUBCUTANEOUS at 02:17

## 2020-07-17 RX ADMIN — PANTOPRAZOLE SODIUM SCH MG: 40 TABLET, DELAYED RELEASE ORAL at 07:29

## 2020-07-17 RX ADMIN — HYDROCODONE BITARTRATE AND ACETAMINOPHEN PRN EACH: 10; 325 TABLET ORAL at 01:07

## 2020-07-17 RX ADMIN — POTASSIUM CHLORIDE SCH MLS/HR: 14.9 INJECTION, SOLUTION INTRAVENOUS at 07:31

## 2020-07-17 RX ADMIN — HYDROMORPHONE HYDROCHLORIDE PRN MG: 1 INJECTION, SOLUTION INTRAMUSCULAR; INTRAVENOUS; SUBCUTANEOUS at 08:27

## 2020-07-17 RX ADMIN — HYDROMORPHONE HYDROCHLORIDE PRN MG: 1 INJECTION, SOLUTION INTRAMUSCULAR; INTRAVENOUS; SUBCUTANEOUS at 05:27

## 2020-07-17 RX ADMIN — HYDROCODONE BITARTRATE AND ACETAMINOPHEN PRN EACH: 10; 325 TABLET ORAL at 10:59

## 2020-07-17 RX ADMIN — BUDESONIDE AND FORMOTEROL FUMARATE DIHYDRATE SCH PUFF: 80; 4.5 AEROSOL RESPIRATORY (INHALATION) at 21:35

## 2020-07-17 RX ADMIN — ASPIRIN 81 MG CHEWABLE TABLET SCH MG: 81 TABLET CHEWABLE at 21:58

## 2020-07-17 RX ADMIN — SERTRALINE HYDROCHLORIDE SCH MG: 100 TABLET ORAL at 07:29

## 2020-07-17 RX ADMIN — LEVOTHYROXINE SODIUM SCH MCG: 75 TABLET ORAL at 05:27

## 2020-07-17 RX ADMIN — HYDROMORPHONE HYDROCHLORIDE PRN MG: 1 INJECTION, SOLUTION INTRAMUSCULAR; INTRAVENOUS; SUBCUTANEOUS at 22:11

## 2020-07-17 RX ADMIN — ASPIRIN 81 MG CHEWABLE TABLET SCH MG: 81 TABLET CHEWABLE at 07:30

## 2020-07-17 NOTE — US
EXAMINATION TYPE: US venous doppler duplex LE LT

 

DATE OF EXAM: 7/17/2020 10:32 PM

 

COMPARISON: NONE

 

CLINICAL HISTORY: Suspected DVT and uncontrolled pain. Left leg pain post total knee.

 

SIDE PERFORMED: Left  

 

TECHNIQUE:  The lower extremity deep venous system is examined utilizing real time linear array sonog
mildred with graded compression, doppler sonography and color-flow sonography.

 

VESSELS IMAGED:

External Iliac Vein (EIV)

Common Femoral Vein

Deep Femoral Vein

Greater Saphenous Vein *

Femoral Vein

Popliteal Vein

 

Proximal Calf Veins

(* superficial vessels)

 

 

 

 

 

Left Leg:  Negative for DVT

 

Complex fluid collection medial left pop fossa measures 5.7 x 1.4 x 3.9 cm. Nodes noted left groin.

 

IMPRESSION: No evidence of left leg deep vein thrombosis. Popliteal cyst is noted.

## 2020-07-17 NOTE — P.PN
Subjective


Progress Note Date: 07/17/20


Principal diagnosis: 


Left TKA





Patient is seen at bedside this morning.  She is postop day #1 from left total 

knee arthroplasty.  She has pain at the surgical site as expected but denies any

new complaints.  She denies numbness, tingling or calf pain.  Review of systems 

is negative for fever, chills, chest pain, shortness of breath or other








Objective





- Vital Signs


Vital signs: 


                                   Vital Signs











Temp  98.5 F   07/17/20 07:00


 


Pulse  65   07/17/20 07:00


 


Resp  16   07/17/20 07:40


 


BP  104/63   07/17/20 11:19


 


Pulse Ox  94 L  07/17/20 11:19








                                 Intake & Output











 07/16/20 07/17/20 07/17/20





 18:59 06:59 18:59


 


Intake Total 951  200


 


Output Total 100  


 


Balance 851  200


 


Weight 85.5 kg  


 


Intake:   


 


    


 


  Oral   200


 


Output:   


 


  Estimated Blood Loss 100  


 


Other:   


 


  # Voids 1 2 














- Exam


Inspection reveals a benign surgical wound.  There is no active bleeding or 

drainage.  Neurovascular status is intact throughout the lower extremity with 

motor and sensation fully intact.  Calf is soft and nontender.  2+ dorsalis 

pedis pulse and less than 2 second cap refill is present.








- Constitutional


General appearance: Present: no acute distress





- Labs


CBC & Chem 7: 


                                 07/17/20 08:02





Labs: 


                  Abnormal Lab Results - Last 24 Hours (Table)











  07/17/20 Range/Units





  08:02 


 


RBC  3.66 L  (3.80-5.40)  m/uL


 


Hgb  9.5 L D  (11.4-16.0)  gm/dL


 


Hct  30.3 L  (34.0-46.0)  %


 


RDW  15.9 H  (11.5-15.5)  %














Assessment and Plan


(1) S/P total knee arthroplasty


Narrative/Plan: 


She will continue with routine postop orthopedic protocol including pain 

management, wound care, PT, DVT prophylaxis and medical management. Will work on

improving pain control today.  Expect that he will transfer to home tomorrow


Current Visit: Yes   Status: Acute   Priority: Medium   Code(s): Z96.659 - 

PRESENCE OF UNSPECIFIED ARTIFICIAL KNEE JOINT   SNOMED Code(s): 6823152159818


   


Time with Patient: Less than 30

## 2020-07-17 NOTE — P.PN
Progress Note - Text


Progress Note Date: 07/17/20


Patient complains of severe pain in the thigh and behind the knee.  No pain at 

the incision.  Moderate relief with breakthrough dilaudid.  Catheter site clean 

and dry





A/P POD#1 s/p L TKA


- consider adding valium for muscle spasm component


- continue adductor catheter

## 2020-07-17 NOTE — P.PN
Subjective





Patient pain in the left knee improved after she was started on Valium.





Constitutional: Denied any fatigue denied any fever.


Cardio vascular: denied any chest pain, palpitations


Gastrointestinal denied any nausea vomiting


Pulmonary: Denied any shortness of breath cough


Neurologic denied any new focal deficits





All inpatient medications were reviewed and appropriate changes in these 

medications as dictated in the interval history and assessment and plan.





Objective





- Vital Signs


Vital signs: 


                                   Vital Signs











Temp  98.5 F   07/17/20 07:00


 


Pulse  65   07/17/20 07:00


 


Resp  16   07/17/20 07:40


 


BP  104/63   07/17/20 11:19


 


Pulse Ox  94 L  07/17/20 11:19








                                 Intake & Output











 07/16/20 07/17/20 07/17/20





 18:59 06:59 18:59


 


Intake Total 951  200


 


Output Total 100  


 


Balance 851  200


 


Weight 85.5 kg  


 


Intake:   


 


    


 


  Oral   200


 


Output:   


 


  Estimated Blood Loss 100  


 


Other:   


 


  # Voids 1 2 














- Exam











PHYSICAL EXAMINATION: 





GENERAL: The patient is alert and oriented x3, not in any acute distress. Well 

developed, well nourished. 


HEENT: Pupils are round and equally reacting to light. EOMI. No scleral icterus.

No conjunctival pallor. Normocephalic, atraumatic. No pharyngeal erythema. No 

thyromegaly. 


CARDIOVASCULAR: S1 and S2 present. No murmurs, rubs, or gallops. 


PULMONARY: Chest is clear to auscultation, no wheezing or crackles. 


ABDOMEN: Soft, nontender, nondistended, normoactive bowel sounds. No palpable 

organomegaly. 


MUSCULOSKELETAL: No joint swelling or deformity.


EXTREMITIES: No cyanosis, clubbing, or pedal edema.  Left knee wrapped with Ace 

bandages


NEUROLOGICAL: Gross neurological examination did not reveal any focal deficits. 


SKIN: No rashes.





- Labs


CBC & Chem 7: 


                                 07/17/20 08:02





Labs: 


                  Abnormal Lab Results - Last 24 Hours (Table)











  07/17/20 Range/Units





  08:02 


 


RBC  3.66 L  (3.80-5.40)  m/uL


 


Hgb  9.5 L D  (11.4-16.0)  gm/dL


 


Hct  30.3 L  (34.0-46.0)  %


 


RDW  15.9 H  (11.5-15.5)  %














Assessment and Plan


Plan: 


-Hypertension to avoid perioperative hypotension on hold off on losartan and 

hydrochlorothiazide.  Patient blood pressure is low normal today


-Left knee arthroplasty postoperatively patient is an 81 mg aspirin twice a day 

for DVT prophylaxis pain management as per primary service


-Esophageal reflux disease


-Osteoarthritis


-Hypothyroidism


-Fibromyalgia





For above-mentioned chronic visual problems patient will be resumed on 

appropriate home medications

## 2020-07-18 RX ADMIN — HYDROMORPHONE HYDROCHLORIDE PRN MG: 1 INJECTION, SOLUTION INTRAMUSCULAR; INTRAVENOUS; SUBCUTANEOUS at 16:39

## 2020-07-18 RX ADMIN — HYDROMORPHONE HYDROCHLORIDE PRN MG: 1 INJECTION, SOLUTION INTRAMUSCULAR; INTRAVENOUS; SUBCUTANEOUS at 05:53

## 2020-07-18 RX ADMIN — HYDROCODONE BITARTRATE AND ACETAMINOPHEN PRN EACH: 10; 325 TABLET ORAL at 07:41

## 2020-07-18 RX ADMIN — HYDROCODONE BITARTRATE AND ACETAMINOPHEN PRN EACH: 10; 325 TABLET ORAL at 13:18

## 2020-07-18 RX ADMIN — SERTRALINE HYDROCHLORIDE SCH MG: 100 TABLET ORAL at 07:41

## 2020-07-18 RX ADMIN — DOCUSATE SODIUM AND SENNOSIDES SCH EACH: 50; 8.6 TABLET ORAL at 20:44

## 2020-07-18 RX ADMIN — POTASSIUM CHLORIDE SCH: 14.9 INJECTION, SOLUTION INTRAVENOUS at 14:59

## 2020-07-18 RX ADMIN — PANTOPRAZOLE SODIUM SCH MG: 40 TABLET, DELAYED RELEASE ORAL at 07:41

## 2020-07-18 RX ADMIN — HYDROMORPHONE HYDROCHLORIDE PRN MG: 1 INJECTION, SOLUTION INTRAMUSCULAR; INTRAVENOUS; SUBCUTANEOUS at 02:41

## 2020-07-18 RX ADMIN — BUDESONIDE AND FORMOTEROL FUMARATE DIHYDRATE SCH PUFF: 80; 4.5 AEROSOL RESPIRATORY (INHALATION) at 07:24

## 2020-07-18 RX ADMIN — THERA TABS SCH EACH: TAB at 07:41

## 2020-07-18 RX ADMIN — POTASSIUM CHLORIDE SCH: 14.9 INJECTION, SOLUTION INTRAVENOUS at 07:36

## 2020-07-18 RX ADMIN — HYDROCODONE BITARTRATE AND ACETAMINOPHEN PRN EACH: 10; 325 TABLET ORAL at 18:15

## 2020-07-18 RX ADMIN — HYDROMORPHONE HYDROCHLORIDE PRN MG: 1 INJECTION, SOLUTION INTRAMUSCULAR; INTRAVENOUS; SUBCUTANEOUS at 09:53

## 2020-07-18 RX ADMIN — HYDROMORPHONE HYDROCHLORIDE PRN MG: 1 INJECTION, SOLUTION INTRAMUSCULAR; INTRAVENOUS; SUBCUTANEOUS at 00:31

## 2020-07-18 RX ADMIN — HYDROMORPHONE HYDROCHLORIDE PRN MG: 1 INJECTION, SOLUTION INTRAMUSCULAR; INTRAVENOUS; SUBCUTANEOUS at 13:17

## 2020-07-18 RX ADMIN — LEVOTHYROXINE SODIUM SCH MCG: 75 TABLET ORAL at 05:53

## 2020-07-18 RX ADMIN — BUDESONIDE AND FORMOTEROL FUMARATE DIHYDRATE SCH PUFF: 80; 4.5 AEROSOL RESPIRATORY (INHALATION) at 20:54

## 2020-07-18 RX ADMIN — POTASSIUM CHLORIDE SCH MLS/HR: 14.9 INJECTION, SOLUTION INTRAVENOUS at 02:41

## 2020-07-18 RX ADMIN — MELOXICAM SCH MG: 7.5 TABLET ORAL at 09:54

## 2020-07-18 RX ADMIN — ASPIRIN 81 MG CHEWABLE TABLET SCH MG: 81 TABLET CHEWABLE at 20:44

## 2020-07-18 RX ADMIN — HYDROMORPHONE HYDROCHLORIDE PRN MG: 1 INJECTION, SOLUTION INTRAMUSCULAR; INTRAVENOUS; SUBCUTANEOUS at 20:05

## 2020-07-18 RX ADMIN — ASPIRIN 81 MG CHEWABLE TABLET SCH MG: 81 TABLET CHEWABLE at 07:41

## 2020-07-18 NOTE — P.PN
Subjective


Progress Note Date: 07/18/20


Principal diagnosis: 





Postop total left knee arthroplasty.


This is a 49-year-old female who is status post total left knee arthroplasty on 

07/16/2020.  She continues to have pain control issues.  She states that her 

pain is not well controlled and states that she had a difficult night last night

secondary to pain.  Nursing staff states that she has not been up much with 

therapy.  Vital signs are stable.








Objective





- Vital Signs


Vital signs: 


                                   Vital Signs











Temp  98.7 F   07/18/20 07:00


 


Pulse  70   07/18/20 07:00


 


Resp  17   07/18/20 07:00


 


BP  124/81   07/18/20 07:00


 


Pulse Ox  94 L  07/18/20 07:00








                                 Intake & Output











 07/17/20 07/18/20 07/18/20





 18:59 06:59 18:59


 


Intake Total 400  200


 


Output Total  1000 


 


Balance 400 -1000 200


 


Intake:   


 


  Oral 400  200


 


Output:   


 


  Urine  1000 


 


Other:   


 


  Voiding Method  Bedpan 


 


  # Voids 2 1 














- Exam


This is a pleasant 49-year-old female in no acute distress.  She is alert and 

oriented 3.  Exam of the left lower extremity reveals that her incision looks 

good.  There is no erythema or ecchymosis.  There is no active drainage noted.  

She has full foot and ankle motion without difficulty or pain.  Neurovascular 

status to the lower extremity is intact.








- Labs


CBC & Chem 7: 


                                 07/17/20 08:02








Assessment and Plan


(1) Status post total left knee replacement


Current Visit: Yes   Status: Acute   Code(s): Z96.652 - PRESENCE OF LEFT 

ARTIFICIAL KNEE JOINT   SNOMED Code(s): 5711346296803


   


Plan: 


The clinical findings are discussed the patient.  I have added Mobic 15 mg 

daily.  She is to continue on the Norco 10 and Valium.  I've encouraged her to 

move a little more today and get up with physical therapy.  We're planning 

discharge to home tomorrow if she has better pain management.

## 2020-07-18 NOTE — P.PN
Subjective





Patient pain in the left knee improved after she was started on Valium.





07/18/2020


Patient blood pressure is well controlled patient is still complaining of pain.





Constitutional: Denied any fatigue denied any fever.


Cardio vascular: denied any chest pain, palpitations


Gastrointestinal denied any nausea vomiting


Pulmonary: Denied any shortness of breath cough


Neurologic denied any new focal deficits





All inpatient medications were reviewed and appropriate changes in these 

medications as dictated in the interval history and assessment and plan.





Objective





- Vital Signs


Vital signs: 


                                   Vital Signs











Temp  98.6 F   07/18/20 15:00


 


Pulse  70   07/18/20 15:00


 


Resp  16   07/18/20 15:00


 


BP  123/79   07/18/20 15:00


 


Pulse Ox  92 L  07/18/20 15:00








                                 Intake & Output











 07/17/20 07/18/20 07/18/20





 18:59 06:59 18:59


 


Intake Total 400  400


 


Output Total  1000 


 


Balance 400 -1000 400


 


Intake:   


 


  Oral 400  400


 


Output:   


 


  Urine  1000 


 


Other:   


 


  Voiding Method  Bedpan 


 


  # Voids 2 1 3














- Exam











PHYSICAL EXAMINATION: 





GENERAL: The patient is alert and oriented x3, not in any acute distress. Well 

developed, well nourished. 


HEENT: Pupils are round and equally reacting to light. EOMI. No scleral icterus.

No conjunctival pallor. Normocephalic, atraumatic. No pharyngeal erythema. No 

thyromegaly. 


CARDIOVASCULAR: S1 and S2 present. No murmurs, rubs, or gallops. 


PULMONARY: Chest is clear to auscultation, no wheezing or crackles. 


ABDOMEN: Soft, nontender, nondistended, normoactive bowel sounds. No palpable 

organomegaly. 


MUSCULOSKELETAL: No joint swelling or deformity.


EXTREMITIES: No cyanosis, clubbing, or pedal edema.  Left knee wrapped with Ace 

bandages


NEUROLOGICAL: Gross neurological examination did not reveal any focal deficits. 


SKIN: No rashes.





- Labs


CBC & Chem 7: 


                                 07/17/20 08:02








Assessment and Plan


Plan: 


-Hypertension to avoid perioperative hypotension on hold off on losartan and 

hydrochlorothiazide.  Patient blood pressure is low normal today


-Left knee arthroplasty postoperatively patient is an 81 mg aspirin twice a day 

for DVT prophylaxis pain management as per primary service


-Esophageal reflux disease


-Osteoarthritis


-Hypothyroidism


-Fibromyalgia





For above-mentioned chronic visual problems patient will be resumed on 

appropriate home medications

## 2020-07-19 VITALS
SYSTOLIC BLOOD PRESSURE: 123 MMHG | TEMPERATURE: 98.6 F | HEART RATE: 71 BPM | DIASTOLIC BLOOD PRESSURE: 79 MMHG | RESPIRATION RATE: 16 BRPM

## 2020-07-19 LAB
BASOPHILS # BLD AUTO: 0 K/UL (ref 0–0.2)
BASOPHILS NFR BLD AUTO: 0 %
EOSINOPHIL # BLD AUTO: 0.2 K/UL (ref 0–0.7)
EOSINOPHIL NFR BLD AUTO: 4 %
ERYTHROCYTE [DISTWIDTH] IN BLOOD BY AUTOMATED COUNT: 3.36 M/UL (ref 3.8–5.4)
ERYTHROCYTE [DISTWIDTH] IN BLOOD: 15.6 % (ref 11.5–15.5)
HCT VFR BLD AUTO: 27.7 % (ref 34–46)
HGB BLD-MCNC: 8.7 GM/DL (ref 11.4–16)
LYMPHOCYTES # SPEC AUTO: 0.8 K/UL (ref 1–4.8)
LYMPHOCYTES NFR SPEC AUTO: 16 %
MCH RBC QN AUTO: 25.9 PG (ref 25–35)
MCHC RBC AUTO-ENTMCNC: 31.4 G/DL (ref 31–37)
MCV RBC AUTO: 82.4 FL (ref 80–100)
MONOCYTES # BLD AUTO: 0.3 K/UL (ref 0–1)
MONOCYTES NFR BLD AUTO: 6 %
NEUTROPHILS # BLD AUTO: 3.5 K/UL (ref 1.3–7.7)
NEUTROPHILS NFR BLD AUTO: 72 %
PLATELET # BLD AUTO: 193 K/UL (ref 150–450)
WBC # BLD AUTO: 4.9 K/UL (ref 3.8–10.6)

## 2020-07-19 RX ADMIN — HYDROMORPHONE HYDROCHLORIDE PRN MG: 1 INJECTION, SOLUTION INTRAMUSCULAR; INTRAVENOUS; SUBCUTANEOUS at 02:49

## 2020-07-19 RX ADMIN — BUDESONIDE AND FORMOTEROL FUMARATE DIHYDRATE SCH PUFF: 80; 4.5 AEROSOL RESPIRATORY (INHALATION) at 09:27

## 2020-07-19 RX ADMIN — POTASSIUM CHLORIDE SCH MLS/HR: 14.9 INJECTION, SOLUTION INTRAVENOUS at 02:36

## 2020-07-19 RX ADMIN — ASPIRIN 81 MG CHEWABLE TABLET SCH MG: 81 TABLET CHEWABLE at 07:29

## 2020-07-19 RX ADMIN — POTASSIUM CHLORIDE SCH: 14.9 INJECTION, SOLUTION INTRAVENOUS at 10:55

## 2020-07-19 RX ADMIN — LEVOTHYROXINE SODIUM SCH MCG: 75 TABLET ORAL at 05:32

## 2020-07-19 RX ADMIN — SERTRALINE HYDROCHLORIDE SCH MG: 100 TABLET ORAL at 07:29

## 2020-07-19 RX ADMIN — POTASSIUM CHLORIDE SCH: 14.9 INJECTION, SOLUTION INTRAVENOUS at 08:45

## 2020-07-19 RX ADMIN — HYDROMORPHONE HYDROCHLORIDE PRN MG: 1 INJECTION, SOLUTION INTRAMUSCULAR; INTRAVENOUS; SUBCUTANEOUS at 07:28

## 2020-07-19 RX ADMIN — THERA TABS SCH EACH: TAB at 07:29

## 2020-07-19 RX ADMIN — MELOXICAM SCH MG: 7.5 TABLET ORAL at 07:29

## 2020-07-19 RX ADMIN — HYDROCODONE BITARTRATE AND ACETAMINOPHEN PRN EACH: 10; 325 TABLET ORAL at 05:36

## 2020-07-19 RX ADMIN — HYDROCODONE BITARTRATE AND ACETAMINOPHEN PRN EACH: 10; 325 TABLET ORAL at 11:12

## 2020-07-19 RX ADMIN — PANTOPRAZOLE SODIUM SCH MG: 40 TABLET, DELAYED RELEASE ORAL at 07:29

## 2020-07-19 NOTE — P.DS
Providers


Date of admission: 


07/17/20 02:10





Expected date of discharge: 07/19/20


Attending physician: 


Howard Phipps





Consults: 





                                        





07/16/20 11:08


Consult Physician Routine 


   Consulting Provider: Lenin Duarte


   Consult Reason/Comments: post op medical management


   Do you want consulting provider notified?: Yes











Primary care physician: 


Lena Díaz NPC








- Discharge Diagnosis(es)


(1) Status post total left knee replacement


Current Visit: Yes   Status: Acute   


Hospital Course: 


This is a 49-year-old female who was last seen with complaint of continued left 

knee pain.  The patient has a known history of degenerative arthritis of the 

left knee and presents to discuss surgical options.  After discussion and 

consideration the patient elects to proceed with total left knee arthroplasty.  

The patient is seen preoperatively by her primary care physician and cleared for

surgery.





The patient is admitted to [facility] for total left knee arthroplasty.

 The procedures performed without complication or sequelae.  He is doing well 

postoperatively.  Vital signs are stable at discharge.  Labs are stable at 

discharge. the patient is ambulating well with walker with minimal assistance.





The patient is discharged to home on postop day #3 pending medical clearance.  

Please see orders and refer to the med rec for accurate list of medications.





Patient Condition at Discharge: Good





Plan - Discharge Summary


Discharge Rx Participant: Yes


New Discharge Prescriptions: 


New


   Aspirin [Adult Low Dose Aspirin EC] 81 mg PO BID #60 tablet.


   HYDROcodone/APAP 10-325MG [Norco ] 1 tab PO Q4HR PRN #42 tab


     PRN Reason: Pain


   Metaxalone [Skelaxin] 800 mg PO TID PRN #21 tab


     PRN Reason: Spasms


   Meloxicam 15 mg PO DAILY #30 tablet





Continue


   Levothyroxine Sodium [Synthroid] 150 mcg PO DAILY


   Cetirizine HCl [Zyrtec] 10 mg PO DAILY


   Sertraline HCl [Zoloft] 150 mg PO DAILY


   Fluticasone/Salmeterol [Advair 100-50 Diskus] 1 inhalation PO RT-Q12H


   Albuterol Inhaler (Mhu) [Ventolin Hfa Inhaler (Mhu)] 1 - 2 puff INHALATION 

RT-Q6H PRN


     PRN Reason: Shortness Of Breath


   Omeprazole 40 mg PO DAILY #30 cap


   Acetaminophen Tab [Tylenol] 500 mg PO Q6HR PRN  tab


     PRN Reason: Fever And/ Or Pain


   Trazodone(Unknown Dose) 1 tab PO HS


   Norco(Unknown Dose) 1 tab PO Q6H PRN


     PRN Reason: Pain


   Losartan-Hctz 50-12.5 mg [Hyzaar 50-12.5] 1 tab PO DAILY #0


Discharge Medication List





Levothyroxine Sodium [Synthroid] 150 mcg PO DAILY 02/27/15 [History]


Cetirizine HCl [Zyrtec] 10 mg PO DAILY 05/22/18 [History]


Albuterol Inhaler (Mhu) [Ventolin Hfa Inhaler (Mhu)] 1 - 2 puff INHALATION RT-

Q6H PRN 06/08/19 [History]


Fluticasone/Salmeterol [Advair 100-50 Diskus] 1 inhalation PO RT-Q12H 06/08/19 

[History]


Sertraline HCl [Zoloft] 150 mg PO DAILY 06/08/19 [History]


Omeprazole 40 mg PO DAILY #30 cap 06/10/19 [Rx]


Acetaminophen Tab [Tylenol] 500 mg PO Q6HR PRN  tab 06/11/19 [Rx]


Norco(Unknown Dose) 1 tab PO Q6H PRN 07/14/20 [History]


Trazodone(Unknown Dose) 1 tab PO HS 07/14/20 [History]


Aspirin [Adult Low Dose Aspirin EC] 81 mg PO BID #60 tablet. 07/17/20 [Rx]


HYDROcodone/APAP 10-325MG [Norco ] 1 tab PO Q4HR PRN #42 tab 07/17/20 [Rx]


Metaxalone [Skelaxin] 800 mg PO TID PRN #21 tab 07/17/20 [Rx]


Losartan-Hctz 50-12.5 mg [Hyzaar 50-12.5] 1 tab PO DAILY #0 07/19/20 [Rx]


Meloxicam 15 mg PO DAILY #30 tablet 07/19/20 [Rx]








Follow up Appointment(s)/Referral(s): 


[facility], [NON-STAFF] - As Needed


Howard Phipps MD [STAFF PHYSICIAN] - 07/28/20 10:05 am


Patient Instructions/Handouts:  *Surgery [facility] - Scopalamine Patch Instructions


Activity/Diet/Wound Care/Special Instructions: 


Keep wound clean and dry


Take meds as directed


Follow-up with Dr. Phipps in office


Weight bear as tolerated


May shower in 3 days if no bleeding





Discharge Disposition: HOME WITH HOME HEALTH SERVICES

## 2020-07-19 NOTE — P.PN
Subjective





Patient pain in the left knee improved after she was started on Valium.





07/18/2020


Patient blood pressure is well controlled patient is still complaining of pain.





07/19/2020


Patient pain is better patient is being discharged today patient blood pressures

still within normal limits because of which asked her to hold off her aunt on 

her antidepressant medications which we are not giving her since her admission 

and asked her to check the blood pressure at home and follow with PCP who can 

resume the medication if needed at that time





Constitutional: Denied any fatigue denied any fever.


Cardio vascular: denied any chest pain, palpitations


Gastrointestinal denied any nausea vomiting


Pulmonary: Denied any shortness of breath cough


Neurologic denied any new focal deficits





All inpatient medications were reviewed and appropriate changes in these 

medications as dictated in the interval history and assessment and plan.





Objective





- Vital Signs


Vital signs: 


                                   Vital Signs











Temp  98.6 F   07/19/20 07:00


 


Pulse  71   07/19/20 07:00


 


Resp  16   07/19/20 07:00


 


BP  123/79   07/19/20 07:00


 


Pulse Ox  93 L  07/19/20 07:00








                                 Intake & Output











 07/18/20 07/19/20 07/19/20





 18:59 06:59 18:59


 


Intake Total 400  200


 


Balance 400  200


 


Intake:   


 


  Oral 400  200


 


Other:   


 


  Voiding Method  Toilet 


 


  # Voids 3 1 














- Exam











PHYSICAL EXAMINATION: 





GENERAL: The patient is alert and oriented x3, not in any acute distress. Well 

developed, well nourished. 


HEENT: Pupils are round and equally reacting to light. EOMI. No scleral icterus.

No conjunctival pallor. Normocephalic, atraumatic. No pharyngeal erythema. No 

thyromegaly. 


CARDIOVASCULAR: S1 and S2 present. No murmurs, rubs, or gallops. 


PULMONARY: Chest is clear to auscultation, no wheezing or crackles. 


ABDOMEN: Soft, nontender, nondistended, normoactive bowel sounds. No palpable 

organomegaly. 


MUSCULOSKELETAL: No joint swelling or deformity.


EXTREMITIES: No cyanosis, clubbing, or pedal edema.  Left knee wrapped with Ace 

bandages


NEUROLOGICAL: Gross neurological examination did not reveal any focal deficits. 


SKIN: No rashes.





- Labs


CBC & Chem 7: 


                                 07/19/20 09:18





Labs: 


                  Abnormal Lab Results - Last 24 Hours (Table)











  07/19/20 Range/Units





  09:18 


 


RBC  3.36 L  (3.80-5.40)  m/uL


 


Hgb  8.7 L  (11.4-16.0)  gm/dL


 


Hct  27.7 L  (34.0-46.0)  %


 


RDW  15.6 H  (11.5-15.5)  %


 


Lymphocytes #  0.8 L  (1.0-4.8)  k/uL














Assessment and Plan


Plan: 


-Hypertension  hold off on losartan and hydrochlorothiazide.  Patient blood 

pressure is low normal today further management as mentioned above


-Left knee arthroplasty postoperatively patient is an 81 mg aspirin twice a day 

for DVT prophylaxis pain management as per primary service


-Esophageal reflux disease


-Osteoarthritis


-Hypothyroidism


-Fibromyalgia





For above-mentioned chronic visual problems patient will be resumed on 

appropriate home medications

## 2020-07-21 ENCOUNTER — HOSPITAL ENCOUNTER (OUTPATIENT)
Dept: HOSPITAL 47 - EC | Age: 50
Setting detail: OBSERVATION
LOS: 2 days | Discharge: HOME | End: 2020-07-23
Attending: ORTHOPAEDIC SURGERY | Admitting: ORTHOPAEDIC SURGERY
Payer: COMMERCIAL

## 2020-07-21 DIAGNOSIS — Z90.49: ICD-10-CM

## 2020-07-21 DIAGNOSIS — Z90.710: ICD-10-CM

## 2020-07-21 DIAGNOSIS — Z87.820: ICD-10-CM

## 2020-07-21 DIAGNOSIS — Z88.0: ICD-10-CM

## 2020-07-21 DIAGNOSIS — M54.9: ICD-10-CM

## 2020-07-21 DIAGNOSIS — K76.0: ICD-10-CM

## 2020-07-21 DIAGNOSIS — M19.90: ICD-10-CM

## 2020-07-21 DIAGNOSIS — E07.9: ICD-10-CM

## 2020-07-21 DIAGNOSIS — Z79.899: ICD-10-CM

## 2020-07-21 DIAGNOSIS — M71.22: ICD-10-CM

## 2020-07-21 DIAGNOSIS — I10: ICD-10-CM

## 2020-07-21 DIAGNOSIS — F41.9: ICD-10-CM

## 2020-07-21 DIAGNOSIS — Z79.891: ICD-10-CM

## 2020-07-21 DIAGNOSIS — Z91.81: ICD-10-CM

## 2020-07-21 DIAGNOSIS — Z87.19: ICD-10-CM

## 2020-07-21 DIAGNOSIS — Z79.890: ICD-10-CM

## 2020-07-21 DIAGNOSIS — Z79.82: ICD-10-CM

## 2020-07-21 DIAGNOSIS — M79.7: ICD-10-CM

## 2020-07-21 DIAGNOSIS — M25.562: ICD-10-CM

## 2020-07-21 DIAGNOSIS — Z98.84: ICD-10-CM

## 2020-07-21 DIAGNOSIS — F32.9: ICD-10-CM

## 2020-07-21 DIAGNOSIS — Z82.3: ICD-10-CM

## 2020-07-21 DIAGNOSIS — K21.9: ICD-10-CM

## 2020-07-21 DIAGNOSIS — E89.0: ICD-10-CM

## 2020-07-21 DIAGNOSIS — J45.909: ICD-10-CM

## 2020-07-21 DIAGNOSIS — Z80.3: ICD-10-CM

## 2020-07-21 DIAGNOSIS — Z11.59: ICD-10-CM

## 2020-07-21 DIAGNOSIS — Z96.653: ICD-10-CM

## 2020-07-21 DIAGNOSIS — G89.18: Primary | ICD-10-CM

## 2020-07-21 LAB
ALBUMIN SERPL-MCNC: 3.7 G/DL (ref 3.5–5)
ALP SERPL-CCNC: 55 U/L (ref 38–126)
ALT SERPL-CCNC: 11 U/L (ref 4–34)
ANION GAP SERPL CALC-SCNC: 6 MMOL/L
AST SERPL-CCNC: 26 U/L (ref 14–36)
BASOPHILS # BLD AUTO: 0 K/UL (ref 0–0.2)
BASOPHILS NFR BLD AUTO: 0 %
BUN SERPL-SCNC: 11 MG/DL (ref 7–17)
CALCIUM SPEC-MCNC: 8.8 MG/DL (ref 8.4–10.2)
CHLORIDE SERPL-SCNC: 104 MMOL/L (ref 98–107)
CO2 SERPL-SCNC: 26 MMOL/L (ref 22–30)
EOSINOPHIL # BLD AUTO: 0.3 K/UL (ref 0–0.7)
EOSINOPHIL NFR BLD AUTO: 5 %
ERYTHROCYTE [DISTWIDTH] IN BLOOD BY AUTOMATED COUNT: 3.71 M/UL (ref 3.8–5.4)
ERYTHROCYTE [DISTWIDTH] IN BLOOD: 15.3 % (ref 11.5–15.5)
GLUCOSE SERPL-MCNC: 80 MG/DL (ref 74–99)
HCT VFR BLD AUTO: 30.5 % (ref 34–46)
HGB BLD-MCNC: 9.7 GM/DL (ref 11.4–16)
LYMPHOCYTES # SPEC AUTO: 1.1 K/UL (ref 1–4.8)
LYMPHOCYTES NFR SPEC AUTO: 18 %
MCH RBC QN AUTO: 26.1 PG (ref 25–35)
MCHC RBC AUTO-ENTMCNC: 31.8 G/DL (ref 31–37)
MCV RBC AUTO: 82.2 FL (ref 80–100)
MONOCYTES # BLD AUTO: 0.4 K/UL (ref 0–1)
MONOCYTES NFR BLD AUTO: 6 %
NEUTROPHILS # BLD AUTO: 4.1 K/UL (ref 1.3–7.7)
NEUTROPHILS NFR BLD AUTO: 69 %
PLATELET # BLD AUTO: 340 K/UL (ref 150–450)
POTASSIUM SERPL-SCNC: 4 MMOL/L (ref 3.5–5.1)
PROT SERPL-MCNC: 6.5 G/DL (ref 6.3–8.2)
SODIUM SERPL-SCNC: 136 MMOL/L (ref 137–145)
WBC # BLD AUTO: 6 K/UL (ref 3.8–10.6)

## 2020-07-21 PROCEDURE — 96361 HYDRATE IV INFUSION ADD-ON: CPT

## 2020-07-21 PROCEDURE — 97161 PT EVAL LOW COMPLEX 20 MIN: CPT

## 2020-07-21 PROCEDURE — 96376 TX/PRO/DX INJ SAME DRUG ADON: CPT

## 2020-07-21 PROCEDURE — 96374 THER/PROPH/DIAG INJ IV PUSH: CPT

## 2020-07-21 PROCEDURE — 80053 COMPREHEN METABOLIC PANEL: CPT

## 2020-07-21 PROCEDURE — 93971 EXTREMITY STUDY: CPT

## 2020-07-21 PROCEDURE — 85025 COMPLETE CBC W/AUTO DIFF WBC: CPT

## 2020-07-21 PROCEDURE — 99285 EMERGENCY DEPT VISIT HI MDM: CPT

## 2020-07-21 PROCEDURE — 36415 COLL VENOUS BLD VENIPUNCTURE: CPT

## 2020-07-21 NOTE — ED
General Adult HPI





- General


Source: patient, family, RN notes reviewed, old records reviewed


Mode of arrival: wheelchair


Limitations: physical limitation





<Wu Joseph - Last Filed: 20 23:33>





<Deidre Armando - Last Filed: 20 21:07>





- General


Chief complaint: Extremity Problem,Nontraumatic


Stated complaint: Post Op Knee Pain


Time Seen by Provider: 20 21:03





- History of Present Illness


Initial comments: 


49-year-old female patient comes to ED complaining of left knee pain.  Patient 

points that she had a total knee replacement done by Dr. Phipps one week ago.  

Has been having lots since.  Patient developed a hematoma proximal left femur 

region.  Denies any recent falls or trauma denies any other complaints.








Systemic: Pt denies fatigue, fever/chills, rash. Pt denies weakness, night sweat

s, weight loss. 


Neuro: Pt denies headache, visual disturbances, syncope or pre-syncope.


HEENT: Pt denies ocular discharge or irritation, otalgia, rhinorrhea, 

pharyngitis or notable lymphadenopathy. 


Cardiopulmonary: Pt denies chest pain, SOB, heart palpitations, dyspnea on 

exertion.  


Abdominal/GI: Pt denies abdominal pain, n/v/d. 


: Pt denies dysuria, burning w/ urination, frequency/urgency. Denies new onset

urinary or bowel incontinence.  


MSK: Pt denies loss of strength or function in extremities. 


Neuro: Pt denies new onset weakness, paresthesias. 


 (Wu Joseph)





- Related Data


                                Home Medications











 Medication  Instructions  Recorded  Confirmed


 


Levothyroxine Sodium [Synthroid] 150 mcg PO DAILY 02/27/15 07/21/20


 


Cetirizine HCl [Zyrtec] 10 mg PO DAILY 18


 


Albuterol Inhaler (Mhu) [Ventolin 1 - 2 puff INHALATION RT-Q6H PRN 19





Hfa Inhaler (Mhu)]   


 


Fluticasone/Salmeterol [Advair 1 inhalation PO RT-Q12H 19





100-50 Diskus]   


 


Sertraline HCl [Zoloft] 150 mg PO DAILY 19


 


Cyclobenzaprine [Flexeril] 10 mg PO TID 20


 


traZODone HCL [Desyrel] 100 mg PO HS 20


 


Metaxalone [Skelaxin] 800 mg PO TID PRN 20








                                  Previous Rx's











 Medication  Instructions  Recorded


 


Omeprazole 40 mg PO DAILY #30 cap 06/10/19


 


Aspirin [Adult Low Dose Aspirin EC] 81 mg PO BID #60 tablet. 20


 


HYDROcodone/APAP 10-325MG [Norco 1 tab PO Q4HR PRN #42 tab 20





]  


 


Losartan-Hctz 50-12.5 mg [Hyzaar 1 tab PO DAILY #0 20





50-12.5]  


 


HYDROcodone/APAP 10-325MG [Norco 1 tab PO Q4HR PRN #42 tab 20





]  


 


oxyCODONE ER [OxyCONTIN] 10 mg PO Q12HR 5 Days #10 tab 20











                                    Allergies











Allergy/AdvReac Type Severity Reaction Status Date / Time


 


Penicillins Allergy  Unknown Verified 20 23:46














Review of Systems


ROS Other: All systems not noted in ROS Statement are negative.





<Wu Joseph - Last Filed: 20 23:33>


ROS Other: All systems not noted in ROS Statement are negative.





<Deidre Armando - Last Filed: 20 21:07>


ROS Statement: 


Those systems with pertinent positive or pertinent negative responses have been 

documented in the HPI.








Past Medical History


Past Medical History: Asthma, Chest Pain / Angina, Fibromyalgia, GERD/Reflux, GI

 Bleed, Hypertension, Osteoarthritis (OA), Thyroid Disorder


Additional Past Medical History / Comment(s): DDD WITH BACK PAIN, hx. of FATTY 

LIVER. ,TORN LT ROTATOR CUFF 2016, tripped & fell last year, hit face, had brain

 bleed, ended up w/GI bleed after taking motrin, fell recently & tore left knee 

meniscus.


History of Any Multi-Drug Resistant Organisms: None Reported


Past Surgical History: Appendectomy, Bariatric Surgery, Bladder Surgery, 

Cholecystectomy, Heart Catheterization, Hysterectomy, Tubal Ligation


Additional Past Surgical History / Comment(s): partial thyroidectomy, bladder 

suspension w/mesh (as of 18 patient has since undergone sx 4x to have 

various portions of mesh removed), sinus repair surgery, bilateral knee surgery 

(meniscus, femur fracture on rt) 10-31-16 MILO EN Y GASTRIC BYPASS, EGD, 

COLONOSCOPY, panniculectomy 18. left knee surgery.


Past Anesthesia/Blood Transfusion Reactions: Family History of Problems w/ 

Anesthesia, Motion Sickness


Additional Past Anesthesia/Blood Transfusion Reaction / Comment(s): Pt has 

difficulty waking up, difficult intubation.  Pts grandmother has difficulty 

waking and also difficult intubation.


Past Psychological History: Anxiety, Depression


Smoking Status: Never smoker


Past Alcohol Use History: Rare


Past Drug Use History: None Reported





- Past Family History


  ** Mother


Family Medical History: Cancer


Additional Family Medical History / Comment(s): Breast CA





  ** Father


Family Medical History: Cancer, CVA/TIA


Additional Family Medical History / Comment(s): - stroke





<Wu Joseph - Last Filed: 20 23:33>





General Exam


Limitations: physical limitation





<Wu Joseph - Last Filed: 20 23:33>





- General Exam Comments


Initial Comments: 





Constitutional: NAD, AOX3, Pt has pleasant affect. 


HEENT: NC/AT, trachea midline, neck supple, no lymphadenopathy. Posterior 

pharynx non erythematous, without exudates. External ears appear normal, without

 discharge. Mucous membranes moist. Eyes PERRLA, EOM intact. There is no scleral

 icterus. No pallor noted. 


Cardiopulmonary: RRR, no murmurs, rubs or gallops, no JVD noted. Lungs CTAB in 

anterior and posterior fields. No peripheral edema. 


Neuro: CN II-XII grossly intact. No nuchal rigidity. No raccon eyes, no byrne 

sign, no hemotympanum. No cervical spinal tenderness. 


MSK: Mild amount of tenderness to left lower extremity.  There is a small amount

 of swelling around the knee.  Incision site appears clean and dry.  No 

erythema.  Hematoma is noted on the proximal left femur region midline.  No 

warmth or erythema is noted.  There is some tenderness to the left posterior 

knee region.  Neurovascular intact.  Posterior tibialis pulse +2.








 (Wu Joseph)





Course





                                   Vital Signs











  20





  20:57 23:22


 


Temperature 98.8 F 98.8 F


 


Pulse Rate 78 74


 


Respiratory 18 17





Rate  


 


Blood Pressure 158/90 152/97


 


O2 Sat by Pulse 99 97





Oximetry  














Medical Decision Making





- Lab Data


Result diagrams: 


                                 20 21:34





                                 20 21:34





<Wu Joseph - Last Filed: 20 23:33>





- Lab Data


Result diagrams: 


                                 20 21:34





                                 20 21:34





<Deidre Armando - Last Filed: 20 21:07>





- Medical Decision Making





49-year-old female patient presents to ED with cheif complaint of postoperative 

pain after knee replacement.  Patient vital signs are stable, afebrile.  Patient

 pain well-controlled.  Physical exam displayed hematoma left lower extremity.  

Laboratory investigations are overall unremarkable.  Ultrasound displayed no 

evidence of DVT in left lower extremity.  There is a Popliteal cyst.  Patient to

 be admitted to observation to Dr. Phipps.  Case discussed with Dr. Armando. 











 (Wu Joseph)


I was available for consultation in the emergency department.  The history and 

physical exam were done by the midlevel provider. I was consulted for this 

patients care. I reviewed the case with the midlevel provider and based on 

their presentation of the patient, I agree with the assessment, medical decision

 making and plan of care as documented. 


Chart was dictated using Dragon dictation software.  Attempts were made to 

correct any dictation errors however some typographical errors may persist. 


Patient was seen during a national state of emergency due to the Covid-19 

pandemic. 


 (Deidre Armando)





- Lab Data





                                   Lab Results











  20 Range/Units





  21:34 21:34 


 


WBC  6.0   (3.8-10.6)  k/uL


 


RBC  3.71 L   (3.80-5.40)  m/uL


 


Hgb  9.7 L   (11.4-16.0)  gm/dL


 


Hct  30.5 L   (34.0-46.0)  %


 


MCV  82.2   (80.0-100.0)  fL


 


MCH  26.1   (25.0-35.0)  pg


 


MCHC  31.8   (31.0-37.0)  g/dL


 


RDW  15.3   (11.5-15.5)  %


 


Plt Count  340   (150-450)  k/uL


 


Neutrophils %  69   %


 


Lymphocytes %  18   %


 


Monocytes %  6   %


 


Eosinophils %  5   %


 


Basophils %  0   %


 


Neutrophils #  4.1   (1.3-7.7)  k/uL


 


Lymphocytes #  1.1   (1.0-4.8)  k/uL


 


Monocytes #  0.4   (0-1.0)  k/uL


 


Eosinophils #  0.3   (0-0.7)  k/uL


 


Basophils #  0.0   (0-0.2)  k/uL


 


Hypochromasia  Slight   


 


Sodium   136 L  (137-145)  mmol/L


 


Potassium   4.0  (3.5-5.1)  mmol/L


 


Chloride   104  ()  mmol/L


 


Carbon Dioxide   26  (22-30)  mmol/L


 


Anion Gap   6  mmol/L


 


BUN   11  (7-17)  mg/dL


 


Creatinine   0.52  (0.52-1.04)  mg/dL


 


Est GFR (CKD-EPI)AfAm   >90  (>60 ml/min/1.73 sqM)  


 


Est GFR (CKD-EPI)NonAf   >90  (>60 ml/min/1.73 sqM)  


 


Glucose   80  (74-99)  mg/dL


 


Calcium   8.8  (8.4-10.2)  mg/dL


 


Total Bilirubin   0.7  (0.2-1.3)  mg/dL


 


AST   26  (14-36)  U/L


 


ALT   11  (4-34)  U/L


 


Alkaline Phosphatase   55  ()  U/L


 


Total Protein   6.5  (6.3-8.2)  g/dL


 


Albumin   3.7  (3.5-5.0)  g/dL














Disposition


Is patient prescribed a controlled substance at d/c from ED?: No





<Wu Joseph - Last Filed: 20 23:33>





<Deidre Armando - Last Filed: 20 21:07>


Clinical Impression: 


 Post-operative pain





Disposition: ADMITTED AS IP TO THIS HOSP


Condition: Good

## 2020-07-21 NOTE — US
EXAMINATION TYPE: US venous doppler duplex LE 

 

DATE OF EXAM: 7/21/2020 10:09 PM

 

COMPARISON: US

 

CLINICAL HISTORY: pain. Pain. Patient had surgery 7/16/20. Left inner posterior-medial thigh bruising
 and swelling.

 

SIDE PERFORMED: Left  

 

TECHNIQUE:  The lower extremity deep venous system is examined utilizing real time linear array sonog
mildred with graded compression, doppler sonography and color-flow sonography.

 

VESSELS IMAGED:

External Iliac Vein (EIV)

Common Femoral Vein

Deep Femoral Vein

Greater Saphenous Vein *

Femoral Vein

Popliteal Vein

Small Saphenous Vein *

Proximal Calf Veins

(* superficial vessels)

 

 

 

 

Left Leg:  No evidence of DVT in veins imaged at this time from prox calf veins to EIV. Two mixed/com
plex areas seen medial popliteal area. 

Superior area measures: 5.3 x 4.1 x 1.7 cm. 

Area seen inferior to area mentioned above measures: 5.3 x 4.1 x 1.4 cm. 

 

Hypoechoic area with hyperechoic center seen right groin measuring: 1.4 x 0.8 x 0.7 cm. 

 

IMPRESSION:  No evidence of deep vein thrombosis in the left leg. There is evidence of complex poplit
eal cysts.

## 2020-07-22 RX ADMIN — HYDROCODONE BITARTRATE AND ACETAMINOPHEN PRN EACH: 10; 325 TABLET ORAL at 22:21

## 2020-07-22 RX ADMIN — HYDROCODONE BITARTRATE AND ACETAMINOPHEN PRN EACH: 10; 325 TABLET ORAL at 16:06

## 2020-07-22 RX ADMIN — HYDROMORPHONE HYDROCHLORIDE PRN MG: 1 INJECTION, SOLUTION INTRAMUSCULAR; INTRAVENOUS; SUBCUTANEOUS at 00:48

## 2020-07-22 RX ADMIN — HYDROMORPHONE HYDROCHLORIDE PRN MG: 1 INJECTION, SOLUTION INTRAMUSCULAR; INTRAVENOUS; SUBCUTANEOUS at 04:55

## 2020-07-22 RX ADMIN — ASPIRIN 81 MG CHEWABLE TABLET SCH MG: 81 TABLET CHEWABLE at 21:12

## 2020-07-22 RX ADMIN — ASPIRIN 81 MG CHEWABLE TABLET SCH MG: 81 TABLET CHEWABLE at 09:26

## 2020-07-22 RX ADMIN — HYDROCODONE BITARTRATE AND ACETAMINOPHEN PRN EACH: 10; 325 TABLET ORAL at 09:26

## 2020-07-22 NOTE — P.HPOR
History of Present Illness


H&P Date: 20


Chief Complaint: S/P Right TKA


Patient is a 49 yo female seen at bedside this am. She is s/p left TKA on 

20. She was readmitted last evening through the ED after continued concern 

of swelling at her thigh and difficulty with pain control. She denies numbness 

or tingling. She denies fever or chills. She has no calf pain. She has no other 

complaints. 








Review of Systems


All systems: negative


Constitutional: Denies chills, Denies fever


Eyes: denies blurred vision, denies pain


Ears, nose, mouth and throat: Denies headache, Denies sore throat


Cardiovascular: Denies chest pain, Denies shortness of breath


Respiratory: Denies cough


Gastrointestinal: Denies abdominal pain, Denies diarrhea, Denies nausea, Denies 

vomiting


Genitourinary: Denies dysuria, Denies hematuria


Musculoskeletal: Denies myalgias


Integumentary: Denies pruritus, Denies rash


Neurological: Denies numbness, Denies weakness


Psychiatric: Denies anxiety, Denies depression


Endocrine: Denies fatigue, Denies weight change





Past Medical History


Past Medical History: Asthma, Chest Pain / Angina, Fibromyalgia, GERD/Reflux, GI

Bleed, Hypertension, Osteoarthritis (OA), Thyroid Disorder


Additional Past Medical History / Comment(s): DDD WITH BACK PAIN, hx. of FATTY 

LIVER. ,TORN LT ROTATOR CUFF 2016, tripped & fell last year, hit face, had brain

bleed, ended up w/GI bleed after taking motrin, fell recently & tore left knee 

meniscus.


History of Any Multi-Drug Resistant Organisms: None Reported


Past Surgical History: Appendectomy, Bariatric Surgery, Bladder Surgery, 

Cholecystectomy, Heart Catheterization, Hysterectomy, Tubal Ligation


Additional Past Surgical History / Comment(s): partial thyroidectomy, bladder 

suspension w/mesh (as of 18 patient has since undergone sx 4x to have 

various portions of mesh removed), sinus repair surgery, bilateral knee surgery 

(meniscus, femur fracture on rt) 10-31-16 MILO EN Y GASTRIC BYPASS, EGD, 

COLONOSCOPY, panniculectomy 18. total left knee surgery 


Past Anesthesia/Blood Transfusion Reactions: Family History of Problems w/ 

Anesthesia, Motion Sickness


Additional Past Anesthesia/Blood Transfusion Reaction / Comment(s): Pt has 

difficulty waking up, difficult intubation.  Pts grandmother has difficulty 

waking and also difficult intubation.


Past Psychological History: Anxiety, Depression


Smoking Status: Never smoker


Past Alcohol Use History: Rare


Past Drug Use History: None Reported





- Past Family History


  ** Mother


Family Medical History: Cancer


Additional Family Medical History / Comment(s): Breast CA





  ** Father


Family Medical History: Cancer, CVA/TIA


Additional Family Medical History / Comment(s): - stroke





Medications and Allergies


                                Home Medications











 Medication  Instructions  Recorded  Confirmed  Type


 


Levothyroxine Sodium [Synthroid] 150 mcg PO DAILY 02/27/15 07/21/20 History


 


Cetirizine HCl [Zyrtec] 10 mg PO DAILY 18 History


 


Albuterol Inhaler (Mhu) [Ventolin 1 - 2 puff INHALATION RT-Q6H PRN 19 History





Hfa Inhaler (Mhu)]    


 


Fluticasone/Salmeterol [Advair 1 inhalation PO RT-Q12H 19 History





100-50 Diskus]    


 


Sertraline HCl [Zoloft] 150 mg PO DAILY 19 History


 


Omeprazole 40 mg PO DAILY #30 cap 06/10/19 07/21/20 Rx


 


Aspirin [Adult Low Dose Aspirin EC] 81 mg PO BID #60 tablet. 20

Rx


 


HYDROcodone/APAP 10-325MG [Norco 1 tab PO Q4HR PRN #42 tab 20 Rx





]    


 


Losartan-Hctz 50-12.5 mg [Hyzaar 1 tab PO DAILY #0 20 Rx





50-12.5]    


 


Cyclobenzaprine [Flexeril] 10 mg PO TID 20 History


 


traZODone HCL [Desyrel] 100 mg PO HS 20 History


 


Metaxalone [Skelaxin] 800 mg PO TID PRN 20 History








                                    Allergies











Allergy/AdvReac Type Severity Reaction Status Date / Time


 


Penicillins Allergy  Unknown Verified 20 23:46














Physical Examination


Inspection of left leg shows well healing benign surgical wound. There is 

hematoma at the left thigh consistent with recent surgery and tourniquet. No 

erythema, excessive warmth or sign of infection. Calf is SNT. Neurovascular 

status is intact with motor and sensation throughout left leg. 2+ DP pulse and 

less than 2 sec cap refill present. 








Results





- Labs


Labs: 


                  Abnormal Lab Results - Last 24 Hours (Table)











  20 Range/Units





  21:34 21:34 


 


RBC  3.71 L   (3.80-5.40)  m/uL


 


Hgb  9.7 L   (11.4-16.0)  gm/dL


 


Hct  30.5 L   (34.0-46.0)  %


 


Sodium   136 L  (137-145)  mmol/L








                                      H & H











  20 Range/Units





  21:34 


 


Hgb  9.7 L  (11.4-16.0)  gm/dL


 


Hct  30.5 L  (34.0-46.0)  %











Result Diagrams: 


                                 20 21:34





                                 20 21:34





Assessment and Plan


(1) Post-operative pain


Narrative/Plan: 


She will continue with routine post op orthopedic protocol including PT, wound 

care, pain management, DVT prophylaxis and medical management. Will monitor and 

expect D/C to home tomorrow. 


Current Visit: Yes   Status: Acute   Priority: Medium   Code(s): G89.18 - OTHER 

ACUTE POSTPROCEDURAL PAIN   SNOMED Code(s): 998036570


   


Time with Patient: Less than 30

## 2020-07-23 VITALS
TEMPERATURE: 98.9 F | SYSTOLIC BLOOD PRESSURE: 143 MMHG | HEART RATE: 87 BPM | RESPIRATION RATE: 12 BRPM | DIASTOLIC BLOOD PRESSURE: 72 MMHG

## 2020-07-23 RX ADMIN — ASPIRIN 81 MG CHEWABLE TABLET SCH MG: 81 TABLET CHEWABLE at 09:17

## 2020-07-23 RX ADMIN — HYDROCODONE BITARTRATE AND ACETAMINOPHEN PRN EACH: 10; 325 TABLET ORAL at 06:25

## 2020-07-23 NOTE — P.DS
Providers


Date of admission: 


07/21/20 21:37





Expected date of discharge: 07/23/20


Attending physician: 


Howard Phipps





Primary care physician: 


Leroy Skinner








- Discharge Diagnosis(es)


(1) Post-operative pain


Patient was readmitted for post op pain and concern of swelling status post TKA 

on 7/16/20. Her pain and swelling have improved. Hospital course has remained 

without complication. On day of discharge she is afebrile, vital signs stable, 

labs within acceptable ranges, tolerating by mouth meds and diet, voiding 

without difficulty, positive flatus, denies abdominal pain or calf pain, pain is

controlled on oral pain medication and has no new complaints.  Wound is benign, 

neurovascular status is intact, calf is soft and nontender, abdomen soft and 

nontender.  Review of systems is negative for numbness, tingling, fever, chills,

chest pain, shortness of breath, nausea, vomiting, dizziness, headaches, slurred

speech or other.


Current Visit: Yes   Status: Acute   Priority: Medium   


Patient Condition at Discharge: Good





Plan - Discharge Summary


Discharge Rx Participant: No


New Discharge Prescriptions: 


New


   HYDROcodone/APAP 10-325MG [Norco ] 1 tab PO Q4HR PRN #42 tab


     PRN Reason: Pain


   oxyCODONE ER [OxyCONTIN] 10 mg PO Q12HR 5 Days #10 tab





No Action


   Levothyroxine Sodium [Synthroid] 150 mcg PO DAILY


   Cetirizine HCl [Zyrtec] 10 mg PO DAILY


   Sertraline HCl [Zoloft] 150 mg PO DAILY


   Fluticasone/Salmeterol [Advair 100-50 Diskus] 1 inhalation PO RT-Q12H


   Albuterol Inhaler (Mhu) [Ventolin Hfa Inhaler (Mhu)] 1 - 2 puff INHALATION 

RT-Q6H PRN


     PRN Reason: Shortness Of Breath


   Omeprazole 40 mg PO DAILY #30 cap


   Aspirin [Adult Low Dose Aspirin EC] 81 mg PO BID #60 tablet.


   HYDROcodone/APAP 10-325MG [Norco ] 1 tab PO Q4HR PRN #42 tab


     PRN Reason: Pain


   Losartan-Hctz 50-12.5 mg [Hyzaar 50-12.5] 1 tab PO DAILY #0


   traZODone HCL [Desyrel] 100 mg PO HS


   Cyclobenzaprine [Flexeril] 10 mg PO TID


   Metaxalone [Skelaxin] 800 mg PO TID PRN


     PRN Reason: Spasms


Discharge Medication List





Levothyroxine Sodium [Synthroid] 150 mcg PO DAILY 02/27/15 [History]


Cetirizine HCl [Zyrtec] 10 mg PO DAILY 05/22/18 [History]


Albuterol Inhaler (Mhu) [Ventolin Hfa Inhaler (Mhu)] 1 - 2 puff INHALATION RT-

Q6H PRN 06/08/19 [History]


Fluticasone/Salmeterol [Advair 100-50 Diskus] 1 inhalation PO RT-Q12H 06/08/19 

[History]


Sertraline HCl [Zoloft] 150 mg PO DAILY 06/08/19 [History]


Omeprazole 40 mg PO DAILY #30 cap 06/10/19 [Rx]


Aspirin [Adult Low Dose Aspirin EC] 81 mg PO BID #60 tablet. 07/17/20 [Rx]


HYDROcodone/APAP 10-325MG [Norco ] 1 tab PO Q4HR PRN #42 tab 07/17/20 [Rx]


Losartan-Hctz 50-12.5 mg [Hyzaar 50-12.5] 1 tab PO DAILY #0 07/19/20 [Rx]


Cyclobenzaprine [Flexeril] 10 mg PO TID 07/21/20 [History]


traZODone HCL [Desyrel] 100 mg PO HS 07/21/20 [History]


Metaxalone [Skelaxin] 800 mg PO TID PRN 07/22/20 [History]


HYDROcodone/APAP 10-325MG [Norco ] 1 tab PO Q4HR PRN #42 tab 07/23/20 [Rx]


oxyCODONE ER [OxyCONTIN] 10 mg PO Q12HR 5 Days #10 tab 07/23/20 [Rx]








Follow up Appointment(s)/Referral(s): 


Leroy Skinnre DO [Primary Care Provider] - 1-2 days


Howard Phipps MD [STAFF PHYSICIAN] - 07/28/20


Activity/Diet/Wound Care/Special Instructions: 


WBAT


keep wound clean and dry


take meds as directed


Discharge Disposition: HOME SELF-CARE

## 2020-10-01 ENCOUNTER — HOSPITAL ENCOUNTER (OUTPATIENT)
Dept: HOSPITAL 47 - OR | Age: 50
Setting detail: OBSERVATION
LOS: 2 days | Discharge: HOME HEALTH | End: 2020-10-03
Attending: ORTHOPAEDIC SURGERY | Admitting: ORTHOPAEDIC SURGERY
Payer: COMMERCIAL

## 2020-10-01 VITALS — BODY MASS INDEX: 31.8 KG/M2

## 2020-10-01 DIAGNOSIS — Z90.49: ICD-10-CM

## 2020-10-01 DIAGNOSIS — Z97.3: ICD-10-CM

## 2020-10-01 DIAGNOSIS — F32.9: ICD-10-CM

## 2020-10-01 DIAGNOSIS — G47.00: ICD-10-CM

## 2020-10-01 DIAGNOSIS — Z96.652: ICD-10-CM

## 2020-10-01 DIAGNOSIS — E87.1: ICD-10-CM

## 2020-10-01 DIAGNOSIS — Z87.19: ICD-10-CM

## 2020-10-01 DIAGNOSIS — Z88.0: ICD-10-CM

## 2020-10-01 DIAGNOSIS — Z79.890: ICD-10-CM

## 2020-10-01 DIAGNOSIS — Z80.3: ICD-10-CM

## 2020-10-01 DIAGNOSIS — F41.9: ICD-10-CM

## 2020-10-01 DIAGNOSIS — Z79.82: ICD-10-CM

## 2020-10-01 DIAGNOSIS — Z90.710: ICD-10-CM

## 2020-10-01 DIAGNOSIS — J45.909: ICD-10-CM

## 2020-10-01 DIAGNOSIS — I10: ICD-10-CM

## 2020-10-01 DIAGNOSIS — M79.7: ICD-10-CM

## 2020-10-01 DIAGNOSIS — D64.9: ICD-10-CM

## 2020-10-01 DIAGNOSIS — E89.0: ICD-10-CM

## 2020-10-01 DIAGNOSIS — M71.22: ICD-10-CM

## 2020-10-01 DIAGNOSIS — Z79.899: ICD-10-CM

## 2020-10-01 DIAGNOSIS — K21.9: ICD-10-CM

## 2020-10-01 DIAGNOSIS — Z98.890: ICD-10-CM

## 2020-10-01 DIAGNOSIS — M17.11: Primary | ICD-10-CM

## 2020-10-01 DIAGNOSIS — G89.29: ICD-10-CM

## 2020-10-01 DIAGNOSIS — M54.9: ICD-10-CM

## 2020-10-01 DIAGNOSIS — R26.81: ICD-10-CM

## 2020-10-01 DIAGNOSIS — Z87.11: ICD-10-CM

## 2020-10-01 DIAGNOSIS — Z98.84: ICD-10-CM

## 2020-10-01 DIAGNOSIS — Z79.891: ICD-10-CM

## 2020-10-01 DIAGNOSIS — Z79.51: ICD-10-CM

## 2020-10-01 PROCEDURE — 76942 ECHO GUIDE FOR BIOPSY: CPT

## 2020-10-01 PROCEDURE — 88300 SURGICAL PATH GROSS: CPT

## 2020-10-01 PROCEDURE — 64448 NJX AA&/STRD FEM NRV NFS IMG: CPT

## 2020-10-01 PROCEDURE — 97161 PT EVAL LOW COMPLEX 20 MIN: CPT

## 2020-10-01 PROCEDURE — 94760 N-INVAS EAR/PLS OXIMETRY 1: CPT

## 2020-10-01 PROCEDURE — 97116 GAIT TRAINING THERAPY: CPT

## 2020-10-01 PROCEDURE — 27447 TOTAL KNEE ARTHROPLASTY: CPT

## 2020-10-01 PROCEDURE — 94640 AIRWAY INHALATION TREATMENT: CPT

## 2020-10-01 PROCEDURE — 73560 X-RAY EXAM OF KNEE 1 OR 2: CPT

## 2020-10-01 PROCEDURE — 97110 THERAPEUTIC EXERCISES: CPT

## 2020-10-01 PROCEDURE — 80048 BASIC METABOLIC PNL TOTAL CA: CPT

## 2020-10-01 PROCEDURE — 85025 COMPLETE CBC W/AUTO DIFF WBC: CPT

## 2020-10-01 RX ADMIN — HYDROMORPHONE HYDROCHLORIDE PRN MG: 1 INJECTION, SOLUTION INTRAMUSCULAR; INTRAVENOUS; SUBCUTANEOUS at 19:28

## 2020-10-01 RX ADMIN — HYDROCODONE BITARTRATE AND ACETAMINOPHEN PRN EACH: 10; 325 TABLET ORAL at 14:41

## 2020-10-01 RX ADMIN — HYDROMORPHONE HYDROCHLORIDE PRN MG: 1 INJECTION, SOLUTION INTRAMUSCULAR; INTRAVENOUS; SUBCUTANEOUS at 22:37

## 2020-10-01 RX ADMIN — POTASSIUM CHLORIDE SCH MLS/HR: 14.9 INJECTION, SOLUTION INTRAVENOUS at 12:39

## 2020-10-01 RX ADMIN — POTASSIUM CHLORIDE SCH: 14.9 INJECTION, SOLUTION INTRAVENOUS at 17:21

## 2020-10-01 RX ADMIN — HYDROMORPHONE HYDROCHLORIDE PRN MG: 1 INJECTION, SOLUTION INTRAMUSCULAR; INTRAVENOUS; SUBCUTANEOUS at 16:03

## 2020-10-01 RX ADMIN — ISODIUM CHLORIDE SCH MG: 0.03 SOLUTION RESPIRATORY (INHALATION) at 15:42

## 2020-10-01 RX ADMIN — DOCUSATE SODIUM AND SENNOSIDES SCH EACH: 50; 8.6 TABLET ORAL at 20:08

## 2020-10-01 RX ADMIN — BUDESONIDE AND FORMOTEROL FUMARATE DIHYDRATE SCH PUFF: 80; 4.5 AEROSOL RESPIRATORY (INHALATION) at 20:02

## 2020-10-01 RX ADMIN — POTASSIUM CHLORIDE SCH: 14.9 INJECTION, SOLUTION INTRAVENOUS at 21:14

## 2020-10-01 RX ADMIN — POTASSIUM CHLORIDE SCH: 14.9 INJECTION, SOLUTION INTRAVENOUS at 11:06

## 2020-10-01 RX ADMIN — POTASSIUM CHLORIDE SCH: 14.9 INJECTION, SOLUTION INTRAVENOUS at 20:58

## 2020-10-01 RX ADMIN — ISODIUM CHLORIDE SCH MG: 0.03 SOLUTION RESPIRATORY (INHALATION) at 20:02

## 2020-10-01 RX ADMIN — POTASSIUM CHLORIDE SCH MLS: 14.9 INJECTION, SOLUTION INTRAVENOUS at 06:06

## 2020-10-01 RX ADMIN — HYDROMORPHONE HYDROCHLORIDE PRN MG: 1 INJECTION, SOLUTION INTRAMUSCULAR; INTRAVENOUS; SUBCUTANEOUS at 12:39

## 2020-10-01 RX ADMIN — POTASSIUM CHLORIDE SCH: 14.9 INJECTION, SOLUTION INTRAVENOUS at 11:02

## 2020-10-01 RX ADMIN — POTASSIUM CHLORIDE SCH: 14.9 INJECTION, SOLUTION INTRAVENOUS at 21:03

## 2020-10-01 RX ADMIN — ASPIRIN 81 MG CHEWABLE TABLET SCH MG: 81 TABLET CHEWABLE at 20:08

## 2020-10-01 NOTE — XR
EXAMINATION TYPE: XR knee limited RT

 

DATE OF EXAM: 10/1/2020

 

COMPARISON: NONE

 

TECHNIQUE: Two views submitted

 

HISTORY: Post op

 

FINDINGS:

There is a prosthetic  knee in near anatomic alignment.  There is soft tissue edema and emphysema. 

 

IMPRESSION:

1. Postoperative change.  Appears in near-anatomic alignment

## 2020-10-01 NOTE — OP
OPERATIVE REPORT



DATE OF PROCEDURE:

10/01/2020.



SURGEON:

Dr. Howard Phipps.



ASSISTANT:

Willy ARRIAGA.



PREOPERATIVE DIAGNOSIS:

Right knee osteoarthrosis.



POSTOPERATIVE DIAGNOSIS:

Right knee osteoarthrosis.



OPERATION PERFORMED:

Right total knee arthroplasty.



ANESTHESIA:

Spinal sedation.



ESTIMATED BLOOD LOSS:

100 mL.



TOURNIQUET:

Tourniquet time was 35 minutes at 250 mmHg.



COMPLICATIONS:

None apparent.



DRAINS:

None.



DISPOSITION:

Postanesthesia care unit.



INDICATIONS:

Jess is a 50-year-old female with longstanding history of right knee pain.  History

and physical examination are consistent with advanced right knee osteoarthrosis.  She

has been through significant operative management up to this point.  Further treatment

options were discussed and she decided to go forward with  right total knee

arthroplasty.



The risks of procedure were discussed with her in detail.  These risks include, but are

not limited to risk of infection, nerve damage, bleeding, pain, and a small risk of

deep vein thrombosis which could lead to fatal pulmonary embolism.  There is also risk

of loosening of the implant which could require revision operation.  The patient

understands these risks.  All of her questions were answered to her satisfaction.  An

appropriate informed consent was obtained.



DESCRIPTION OF PROCEDURE:

Patient was identified in preoperative holding area.  The surgical site was marked by

both the patient and myself.  She was given 2 g of Ancef IV for prophylactic purposes,

was then transferred to the operative suite.  She was placed supine on the operative

table.  Spinal anesthetic was then administered and dosed per the anesthesia without

apparent complication.



Examination under anesthesia was then performed.  The patient had full extension.  She

had 110 degrees of flexion.  Medial collateral ligament, lateral collateral ligament,

posterior cruciate ligaments were stable.



Tourniquet was then placed high on the right upper thigh well-padded in preparation for

surgery.  The patient's right lower extremity was then prepped and draped in usual

sterile fashion.  Standard surgical pause undertaken to ensure that we were operating

the correct site and that appropriate preoperative antibiotics were given.  All staff

were in agreement and we proceeded.



The outlines of the patella were marked with a surgical pen.  A planned 12 cm vertical

incision centered over the patella was marked with surgical pen.  Leg was then

exsanguinated with an Esmarch dressing.  The knee was then flexed and tourniquet was

inflated to 250 mmHg.  The total tourniquet time for the procedure was 35 minutes.



Incision was then made with a 10 blade scalpel.  Dissection carried down sharply

overlying fascia.  Great care was taken to minimize the skin flaps.  The knee was then

exposed using a standard medial parapatellar approach.  A small cuff of quadriceps

tendon was then left for suturing.



She was in a bit of varus preoperatively.  A standard medial release was then made.

Superficial medial collateral ligament was dissected off the bone around the posterior

aspect of the proximal tibia.  The medial meniscus was then excised as well.  The

lateral meniscus was also released anteriorly.



The leg was then externally rotated.  The patella was everted.  The knee was flexed.

The retractors then placed to protect the collateral ligaments.



I then proceeded to remove the infrapatellar fat pad.  This was excised sharply

tangentially with fibers of the patellar tendon.



I then proceeded to remove the peripheral osteophytes.  This is done with a rongeur.  I

then proceed with the distal femoral resection.  She did have full extension.  A

planned 9 mm resection was then done.  The femoral canal was then entered in midline of

the femur approximately 10 mm anterior to the origin of the posterior cruciate

ligament.  The mackenzie was then advanced down the center of the femur and placed

intramedullary.



Based on the preoperative radiographs, the angle between the anatomic and mechanical

axis of the femur was approximately 4-5 degrees.  The valgus angle the distal femoral

cutting guide was then set at 4 degrees for the right knee.  The distal femoral cutting

guide was then advanced over the intramedullary mackenzie.  This was seated firmly against

the femur.  I then as mentioned planned to take 9 mm off the distal femur.



The block was then secured onto the femur with pins.  The jig was then removed.  The

distal femoral cut was made through the slot of the block.  The pins then removed.  The

distal cutting block was removed.  The accuracy of the distal femoral cuts was checked

with 2 flat bars.



I then proceeded with femoral sizing.  Posterior referencing sizing guide was held

firmly against the resected distal surface of the femur.  The posterior condyles were

resting on the posterior plane of the guide.  The sizing guide was then placed onto the

anterior femur.  The size was measured as a size 7.



I then assessed for femoral rotation.  Plan was for 3 degrees external rotation.  Three

degrees of external rotation was placed onto the jig.  These holes were then marked.  I

then confirmed the rotation by 3 separate methods. This was done using epicondylar axis

as well as Whitesides line and posterior referencing.  Deemed that the external

rotation was proper.  I then went forward placing the femoral cutting block.  This was

placed over the previously placed pin holes.  The Edmundo wing was then placed onto the

anterior slots to ensure that we would not notch the anterior femur with the anterior

femoral cut.  I then proceed with the anterior femoral cut.  This was flush with the

anterior cortex of the femur.  Posterior cuts were then made followed by the anterior

chamfer cut, then the posterior chamfer cut.  The cutting block was then removed.

Throughout the resection, the collateral ligaments were protected with retractors.  I

then placed a trial size 7 femur.  It was slightly wide mediolateral but the narrow fit

very nicely and it fit flush with the distal end of the femur.  The drill holes were

then made.



I then proceeded with the tibial cut.  I planned for cruciate retaining knee.  The

guide was placed in separate varus valgus and for slope.  The height was set for

approximate 2 mm resection from the medial tibial plateau which was the lower side.  I

was happy with the alignment and the amount of resection.  The cutting block was then

pinned to the proximal tibia.  The alignment mackenzie was removed.  The proximal tibia was

resected with a reciprocating saw.  Again this was done with retractors protecting the

collateral ligaments as well as the posterior cruciate ligament.



I then proceeded to evaluate the flexion extension gaps.  A 10 mm block was then

placed.  The flexion-extension gaps were equal.



I then proceed to resection of posterior osteophytes.  Very minimal posterior

osteophytes.  This is done using a curved osteotome.  This resected the posterior

osteophytes and posterior capsule stripping was done off the posterior aspect of the

femur.  Osteophytes were then removed.



I then proceed resection of patella.  The thickness of patella was measured using the

caliper.  The thickness was approximately 22 mm.  The thickness of the anticipated

patellar dome was taken into account.  Resection was then performed and confirmed to be

equal in 4 quadrants using a caliper.  Approximately 14 mm of bone remained after

resection.  A 29 x 8 standard patellar trial was then placed.  The holes were drilled.

The trial was then placed.



I then proceed with sizing tibial plate.  A size D tibial plate fit very nicely.  I

then placed the trial femur, the tibial tray and the patellar button.  A 10 mm trial

and tibial insert was also placed.  The components fit very nicely.  She had full

extension and flexion.  The extension and flexion gaps were equal and stable to both

varus and valgus stress.  The patella tracked appropriately.



The tibial tray rotation was then marked with a Bovie.  This was externally rotated

properly.



I then proceeded with tibial preparation.  First, prepared the femoral holes removed

femoral component.  The tibial tray was then set for proper external rotation as well

as mediolateral placement onto the tibia.  It was then pinned into place.  I then

proceed with punching the keel.



I then decided to proceed with cementing of all of our components.  The knee was

thoroughly irrigated with sterile saline solution via pulse lavage.  The lateral

geniculate artery was identified and cauterized.  All blood was removed from the bone

of the tibia, femur and patella with pulse lavage.  I then proceed with cementing.



Two packs of antibiotic bone cement were prepared on the back table by the surgical

tech.  I then proceed with cementing the tibia first.  The cement was impacted into the

keel as well as deeply seated in the bone.  A second coat of cement was then placed.

The tibia was then impacted into place.  Excess cement was removed with Cushing's and

Joker's.



I then proceed with cementing of the femoral component.  The femoral component was also

cemented using standard technique.  Excess cement was removed.  A 10 mm trial insert

was then placed into the knee.  It was brought into full extension with a constant

axial load placed until the cement had hardened.



The patellar component was then cemented.  This was held firmly with a compressive

device until the cement had dried.



When the cement had dried, the knee was taken out of extension.  All excess cement was

removed from around the prosthesis.  I then trialed the knee with a 10 mm insert.

Flexion extension gaps were appropriate.  The knee was stable.  It came into full

extension.  I decided to go for the 10 mm clot cross-linked cruciate-retaining tibial

insert.



Polyethylene was then placed onto the tibial tray and locked into place.  The knee was

then reduced.  The knee was again further irrigated with sterile saline solution with

antibiotic added.  The tourniquet was then deflated.  Total tourniquet time for the

procedure was 35 minutes at 250 mmHg.  Final components were Landy Persona size 7

narrow cruciate-retaining femoral component, size D tibial tray, a 10 mm medial

congruent cruciate-retaining polyethylene insert and a 29 x 8 mm patella.



I then proceeded with closure.  Again, the knee was thoroughly irrigated.  The

quadriceps tendon and the medial retinaculum were reapproximated with #2 Ethibond

suture.  The extensor mechanism was then closed with a running #2 Quill suture.



Subcutaneous tissues were closed with 2-0 Vicryl interrupted suture.  The skin was

closed with a running 3-0 Quill suture.  Dermabond was applied to the incision.



Sterile compressive dressings were applied.  All sponge and needle counts were deemed

correct prior to closure.  The patient tolerated procedure well without apparent

complication.  She was transferred recovery room in stable condition.





MMODL / IJN: [id number] / Job#: [id number]

## 2020-10-01 NOTE — CONS
CONSULTATION



REASON FOR CONSULTATION:

Advice regarding asthma and other multiple medical issues, requested by Dr. Phipps.



HISTORY OF PRESENT ILLNESS:

This 50-year-old woman with a past history of asthma, fibromyalgia, GERD, GI bleed,

hypertension, history of appendectomy, bariatric surgery, being followed by primary

physician in [facility] underwent right total knee joint arthroplasty by Dr. Phipps.  The patient tolerated the procedure well.  There is no history of fever,

rigors or chills. No history of headache, loss of consciousness or seizures.

Occasional wheezing was noted.



PAST MEDICAL HISTORY:

History of asthma, fibromyalgia, GERD, GI bleed, hypertension, DJD, history of

appendectomy, bariatric surgery.



MEDICATIONS:

Prior to admission include home medications: trazodone, Zoloft, omeprazole, losartan,

Synthroid, Advair, cetirizine, albuterol p.r.n.



ALLERGIES:

PENICILLIN.



FAMILY HISTORY:

Family history of breast cancer in the family.



SOCIAL HISTORY:

No history of smoking.  Occasional alcohol intake.



REVIEW OF SYSTEMS:

ENT: No diminished vision. No diminished hearing.

Cardio system is as mentioned.

RESPIRATORY: As mentioned earlier.

GI no nausea or vomiting.

 no dysuria.

NERVOUS SYSTEM: No numbness or weakness.

ALLERGY/IMMUNOLOGY: No asthma or hayfever.

MUSCULOSKELETAL: As mentioned earlier.

HEMATOLOGY/ONCOLOGY: No history of anemia.

ENDOCRINE:  Hypothyroidism.

CONSTITUTIONAL: As mentioned earlier.

DERMATOLOGY negative.

RHEUMATOLOGY: Negative.

PSYCHIATRIC: As mentioned earlier.



PHYSICAL EXAM:

Patient is alert, oriented x3.  The pulse is 62.  Blood pressure 124/83, respiration

18, temperature 97.7, pulse ox 99% on 2 L.  HEENT: Conjunctivae normal.

NECK: No JVD.

CARDIOVASCULAR: S1, S2 muffled.

RESPIRATORY: Breath sounds diminished in the bases.  No rhonchi.  A few rhonchi.  No

crackles.

ABDOMEN:  Soft, nontender.  No mass palpable.

LEGS status post right knee arthroplasty.

NERVOUS SYSTEM: Higher functions as mentioned. Moves all four limbs. No focal motor or

sensory deficits.

LYMPHATICS: No lymph nodes palpable in the neck, axillae or groin.

SKIN: No ulcer, no rashes or bleeding.

JOINTS:  No active deforming arthropathy.



LABS:

Hemoglobin 9.7.  Otherwise, chemistry sodium 136.



ASSESSMENT:

1. Status post right total knee arthroplasty.

2. Mild hyponatremia as well as mild anemia prior to admission.

3. History of asthma.

4. Fibromyalgia.

5. Gastroesophageal reflux disease.

6. History of gastrointestinal bleed.

7. Hypertension.

8. History of degenerative joint disease.

9. History of hypothyroidism.

10.Chronic back pain.

11.Appendectomy.

12.History of bariatric surgery.

13.History of cholecystectomy.

14.History of partial thyroidectomy.

15.FULL CODE.



RECOMMENDATIONS AND DISCUSSION:

This 50-year-old woman who presented with multiple complex medical issues, we will

monitor the patient closely.  Continue the current medications, management and

symptomatic treatment. Resume the home medications.  I would also recommend repeat labs

and use the albuterol around the clock now and incentive spirometry.  DVT prophylaxis.

We will follow the patient closely with you.  The patient may be asked to follow up

with primary closely after discharge.



Thank you Dr. Phipps for letting us participate in the care of this patient.





MMBRANDONL / INDRAN: [id number] / Job#: [id number]

## 2020-10-01 NOTE — P.ANPRN
Procedure Note - Anesthesia





- Nerve Block Performed


  ** Right Adductor Canal


Time Out Performed: Yes (06:38)


Date of Procedure: 10/01/20


Procedure Start Time: 06:38


Procedure Stop Time: 06:53


Location of Patient: PreOp


Indication: Acute Post-Operative Pain, Requested by Surgeon (Dr Phipps\)


Sedation Type: Sedate with meaningful contact maintained


Preparation: Sterile Prep, Sterile Dressing


Position: Supine


Catheter: Indwelling


Needle Types: Pajunk


Needle Gauge: 21


Ultrasound used to visualize needle placement: Yes


Ultrasound used to observe medication spread: Yes


Injectate: 0.5% Ropivacaine (see comment for volume) (20cc)


Blood Aspirated: No


Pain Paresthesia on Injection Noted: No


Resistance on Injection: Normal


Image Stored and Saved: Yes


Events: Uneventful and Well Tolerated

## 2020-10-02 VITALS — RESPIRATION RATE: 16 BRPM

## 2020-10-02 LAB
ANION GAP SERPL CALC-SCNC: 7.8 MMOL/L (ref 4–12)
BASOPHILS # BLD AUTO: 0 K/UL (ref 0–0.2)
BASOPHILS NFR BLD AUTO: 0 %
BUN SERPL-SCNC: 16 MG/DL (ref 9–27)
BUN/CREAT SERPL: 26.67 RATIO (ref 12–20)
CALCIUM SPEC-MCNC: 8.5 MG/DL (ref 8.7–10.3)
CHLORIDE SERPL-SCNC: 103 MMOL/L (ref 96–109)
CO2 SERPL-SCNC: 25.2 MMOL/L (ref 21.6–31.8)
EOSINOPHIL # BLD AUTO: 0.1 K/UL (ref 0–0.7)
EOSINOPHIL NFR BLD AUTO: 2 %
ERYTHROCYTE [DISTWIDTH] IN BLOOD BY AUTOMATED COUNT: 3.54 M/UL (ref 3.8–5.4)
ERYTHROCYTE [DISTWIDTH] IN BLOOD: 14.7 % (ref 11.5–15.5)
GLUCOSE SERPL-MCNC: 100 MG/DL (ref 70–110)
HCT VFR BLD AUTO: 28.5 % (ref 34–46)
HGB BLD-MCNC: 8.8 GM/DL (ref 11.4–16)
LYMPHOCYTES # SPEC AUTO: 0.6 K/UL (ref 1–4.8)
LYMPHOCYTES NFR SPEC AUTO: 10 %
MCH RBC QN AUTO: 24.8 PG (ref 25–35)
MCHC RBC AUTO-ENTMCNC: 30.8 G/DL (ref 31–37)
MCV RBC AUTO: 80.5 FL (ref 80–100)
MONOCYTES # BLD AUTO: 0.3 K/UL (ref 0–1)
MONOCYTES NFR BLD AUTO: 6 %
NEUTROPHILS # BLD AUTO: 4.6 K/UL (ref 1.3–7.7)
NEUTROPHILS NFR BLD AUTO: 81 %
PLATELET # BLD AUTO: 172 K/UL (ref 150–450)
POTASSIUM SERPL-SCNC: 4.4 MMOL/L (ref 3.5–5.5)
SODIUM SERPL-SCNC: 136 MMOL/L (ref 135–145)
WBC # BLD AUTO: 5.7 K/UL (ref 3.8–10.6)

## 2020-10-02 RX ADMIN — Medication SCH MG: at 15:55

## 2020-10-02 RX ADMIN — ISODIUM CHLORIDE SCH: 0.03 SOLUTION RESPIRATORY (INHALATION) at 12:00

## 2020-10-02 RX ADMIN — THERA TABS SCH EACH: TAB at 07:20

## 2020-10-02 RX ADMIN — POTASSIUM CHLORIDE SCH: 14.9 INJECTION, SOLUTION INTRAVENOUS at 07:03

## 2020-10-02 RX ADMIN — POTASSIUM CHLORIDE SCH: 14.9 INJECTION, SOLUTION INTRAVENOUS at 16:02

## 2020-10-02 RX ADMIN — POTASSIUM CHLORIDE SCH: 14.9 INJECTION, SOLUTION INTRAVENOUS at 03:34

## 2020-10-02 RX ADMIN — POTASSIUM CHLORIDE SCH: 14.9 INJECTION, SOLUTION INTRAVENOUS at 02:19

## 2020-10-02 RX ADMIN — HYDROCODONE BITARTRATE AND ACETAMINOPHEN PRN EACH: 10; 325 TABLET ORAL at 05:43

## 2020-10-02 RX ADMIN — ISODIUM CHLORIDE SCH MG: 0.03 SOLUTION RESPIRATORY (INHALATION) at 08:19

## 2020-10-02 RX ADMIN — ASPIRIN 81 MG CHEWABLE TABLET SCH MG: 81 TABLET CHEWABLE at 20:24

## 2020-10-02 RX ADMIN — ASPIRIN 81 MG CHEWABLE TABLET SCH MG: 81 TABLET CHEWABLE at 07:19

## 2020-10-02 RX ADMIN — BUDESONIDE AND FORMOTEROL FUMARATE DIHYDRATE SCH: 80; 4.5 AEROSOL RESPIRATORY (INHALATION) at 20:24

## 2020-10-02 RX ADMIN — DOCUSATE SODIUM AND SENNOSIDES SCH EACH: 50; 8.6 TABLET ORAL at 20:24

## 2020-10-02 RX ADMIN — HYDROCODONE BITARTRATE AND ACETAMINOPHEN PRN EACH: 10; 325 TABLET ORAL at 17:01

## 2020-10-02 RX ADMIN — PANTOPRAZOLE SODIUM SCH MG: 40 TABLET, DELAYED RELEASE ORAL at 07:19

## 2020-10-02 RX ADMIN — SERTRALINE HYDROCHLORIDE SCH MG: 100 TABLET ORAL at 07:19

## 2020-10-02 RX ADMIN — BUDESONIDE AND FORMOTEROL FUMARATE DIHYDRATE SCH PUFF: 80; 4.5 AEROSOL RESPIRATORY (INHALATION) at 08:20

## 2020-10-02 RX ADMIN — LORATADINE SCH MG: 10 TABLET ORAL at 07:20

## 2020-10-02 RX ADMIN — OXYCODONE HYDROCHLORIDE SCH MG: 10 TABLET, FILM COATED, EXTENDED RELEASE ORAL at 08:25

## 2020-10-02 RX ADMIN — ISODIUM CHLORIDE SCH: 0.03 SOLUTION RESPIRATORY (INHALATION) at 20:24

## 2020-10-02 RX ADMIN — LOSARTAN POTASSIUM AND HYDROCHLOROTHIAZIDE SCH EACH: 12.5; 5 TABLET ORAL at 10:44

## 2020-10-02 RX ADMIN — ISODIUM CHLORIDE SCH: 0.03 SOLUTION RESPIRATORY (INHALATION) at 16:05

## 2020-10-02 RX ADMIN — HYDROCODONE BITARTRATE AND ACETAMINOPHEN PRN EACH: 10; 325 TABLET ORAL at 12:03

## 2020-10-02 RX ADMIN — LEVOTHYROXINE SODIUM SCH MCG: 75 TABLET ORAL at 05:43

## 2020-10-02 RX ADMIN — HYDROCODONE BITARTRATE AND ACETAMINOPHEN PRN EACH: 10; 325 TABLET ORAL at 21:54

## 2020-10-02 RX ADMIN — OXYCODONE HYDROCHLORIDE SCH MG: 10 TABLET, FILM COATED, EXTENDED RELEASE ORAL at 20:24

## 2020-10-02 RX ADMIN — HYDROMORPHONE HYDROCHLORIDE PRN MG: 1 INJECTION, SOLUTION INTRAMUSCULAR; INTRAVENOUS; SUBCUTANEOUS at 02:43

## 2020-10-02 NOTE — P.PN
Subjective


Progress Note Date: 10/02/20


Principal diagnosis: 


Right TKA





Patient is seen at bedside this morning.  She is postop day #1 from right total 

knee arthroplasty.  She has pain at the surgical site as expected but denies any

new complaints.  She denies numbness, tingling or calf pain.  Review of systems 

is negative for fever, chills, chest pain, shortness of breath or other








Objective





- Vital Signs


Vital signs: 


                                   Vital Signs











Temp  98.7 F   10/02/20 07:00


 


Pulse  68   10/02/20 08:36


 


Resp  17   10/02/20 07:00


 


BP  120/79   10/02/20 07:00


 


Pulse Ox  92 L  10/02/20 07:00








                                 Intake & Output











 10/01/20 10/02/20 10/02/20





 18:59 06:59 18:59


 


Intake Total 951 50 


 


Output Total 100  


 


Balance 851 50 


 


Weight 82.7 kg  


 


Intake:   


 


    


 


  Intake, IV Titration  50 





  Amount   


 


    ceFAZolin 2 gm In Sodium  50 





    Chloride 0.9% 50 ml @ 100   





    mls/hr IVPB Q8H [facility] Rx#:   





    [id number]   


 


Output:   


 


  Estimated Blood Loss 100  


 


Other:   


 


  Voiding Method  Toilet 


 


  # Voids 1 1 














- Exam


Inspection reveals a benign surgical wound.  There is no active bleeding or 

drainage.  Neurovascular status is intact throughout the lower extremity with 

motor and sensation fully intact.  Calf is soft and nontender.  2+ dorsalis 

pedis pulse and less than 2 second cap refill is present.








- Constitutional


General appearance: Present: no acute distress





- Labs


CBC & Chem 7: 


                                 10/02/20 07:25





Labs: 


                  Abnormal Lab Results - Last 24 Hours (Table)











  10/02/20 Range/Units





  07:25 


 


RBC  3.54 L  (3.80-5.40)  m/uL


 


Hgb  8.8 L  (11.4-16.0)  gm/dL


 


Hct  28.5 L  (34.0-46.0)  %


 


MCH  24.8 L  (25.0-35.0)  pg


 


MCHC  30.8 L  (31.0-37.0)  g/dL


 


Lymphocytes #  0.6 L  (1.0-4.8)  k/uL














Assessment and Plan


(1) Osteoarthritis of right knee


Narrative/Plan: 


She will continue with routine postop orthopedic protocol including pain man

agement, wound care, PT, DVT prophylaxis and medical management.  Expect that 

she will transfer to home tomorrow


Current Visit: Yes   Status: Acute   Priority: Medium   Code(s): M17.11 - 

UNILATERAL PRIMARY OSTEOARTHRITIS, RIGHT KNEE   SNOMED Code(s): 028053364733139


   


Time with Patient: Less than 30

## 2020-10-02 NOTE — P.PN
Progress Note - Text


Progress Note Date: 10/02/20


Patient with complaints of pain 7/10.  Ambulating to the bathroom without 

difficulty and appears in no significant distress.  Using multimodal analgesia 

with some success.  Patient is on chronic opiates and will have significant 

tolerance.  Left adductor catheter site clean and dry.  POD#1 s/p left TKA.





- multimodal analgesia


- increase rate of adductor canal catheter

## 2020-10-02 NOTE — PN
PROGRESS NOTE



DATE OF SERVICE:

10/02/2020



This 50-year-old woman is admitted with right total knee arthroplasty, is complaining

of knee pain.  No chest pain.  No palpitations.  No fever.



PHYSICAL EXAMINATION:

Alert and oriented times three. Pulse 70, blood pressure 149/80, respirations 16,

temperature 98.2, pulse ox 94% on room air.

HEENT: Conjunctivae normal.

NECK: No JVD.

CARDIOVASCULAR: S1, S2 muffled.

RESPIRATIONS: Breath sounds diminished in the bases.  No rhonchi.  No crackles.

ABDOMEN:  Soft, nontender.

LEGS:  Status post right knee arthroplasty.

NERVOUS SYSTEM: No focal deficits.



LABS:

WBC 5.3, hemoglobin is 8.8.  Otherwise, calcium is 8.5.



ASSESSMENT:

1. Status post right total knee arthroplasty.

2. Mild hyponatremia as well as mild anemia present in the previous labs.

3. History of asthma.

4. Fibromyalgia.

5. Gastroesophageal reflux disease.

6. History of gastrointestinal bleed.

7. Hypertension.

8. History of degenerative joint disease.

9. History of hypothyroidism.

10.History of chronic back pain.

11.Appendectomy.

12.History of bariatric surgery.

13.History of cholecystectomy.

14.History of partial thyroidectomy.

15.FULL CODE.



RECOMMENDATIONS AND DISCUSSION:

Recommend to continue current medications, management and symptomatic treatment.

Otherwise, I recommend iron supplementation.  Repeat labs.  DVT prophylaxis.  Incentive

spirometry.  Closely follow.  Recommend close followup with primary physician in the

outpatient setting.



Thank you Dr. Phipps.





HILARIO / INDRAN: [id number] / Job#: [id number]

## 2020-10-03 VITALS — TEMPERATURE: 98.2 F | DIASTOLIC BLOOD PRESSURE: 89 MMHG | SYSTOLIC BLOOD PRESSURE: 152 MMHG

## 2020-10-03 VITALS — HEART RATE: 70 BPM

## 2020-10-03 LAB
BASOPHILS # BLD AUTO: 0 K/UL (ref 0–0.2)
BASOPHILS NFR BLD AUTO: 0 %
EOSINOPHIL # BLD AUTO: 0.1 K/UL (ref 0–0.7)
EOSINOPHIL NFR BLD AUTO: 2 %
ERYTHROCYTE [DISTWIDTH] IN BLOOD BY AUTOMATED COUNT: 3.7 M/UL (ref 3.8–5.4)
ERYTHROCYTE [DISTWIDTH] IN BLOOD: 14.6 % (ref 11.5–15.5)
HCT VFR BLD AUTO: 28.7 % (ref 34–46)
HGB BLD-MCNC: 8.9 GM/DL (ref 11.4–16)
LYMPHOCYTES # SPEC AUTO: 0.9 K/UL (ref 1–4.8)
LYMPHOCYTES NFR SPEC AUTO: 18 %
MCH RBC QN AUTO: 24.1 PG (ref 25–35)
MCHC RBC AUTO-ENTMCNC: 31.1 G/DL (ref 31–37)
MCV RBC AUTO: 77.5 FL (ref 80–100)
MONOCYTES # BLD AUTO: 0.3 K/UL (ref 0–1)
MONOCYTES NFR BLD AUTO: 6 %
NEUTROPHILS # BLD AUTO: 3.7 K/UL (ref 1.3–7.7)
NEUTROPHILS NFR BLD AUTO: 73 %
PLATELET # BLD AUTO: 183 K/UL (ref 150–450)
WBC # BLD AUTO: 5.1 K/UL (ref 3.8–10.6)

## 2020-10-03 RX ADMIN — Medication SCH MG: at 12:09

## 2020-10-03 RX ADMIN — LEVOTHYROXINE SODIUM SCH MCG: 75 TABLET ORAL at 05:52

## 2020-10-03 RX ADMIN — ASPIRIN 81 MG CHEWABLE TABLET SCH MG: 81 TABLET CHEWABLE at 09:15

## 2020-10-03 RX ADMIN — PANTOPRAZOLE SODIUM SCH MG: 40 TABLET, DELAYED RELEASE ORAL at 07:32

## 2020-10-03 RX ADMIN — POTASSIUM CHLORIDE SCH: 14.9 INJECTION, SOLUTION INTRAVENOUS at 07:02

## 2020-10-03 RX ADMIN — BUDESONIDE AND FORMOTEROL FUMARATE DIHYDRATE SCH PUFF: 80; 4.5 AEROSOL RESPIRATORY (INHALATION) at 09:49

## 2020-10-03 RX ADMIN — HYDROCODONE BITARTRATE AND ACETAMINOPHEN PRN EACH: 10; 325 TABLET ORAL at 07:30

## 2020-10-03 RX ADMIN — THERA TABS SCH EACH: TAB at 09:16

## 2020-10-03 RX ADMIN — ISODIUM CHLORIDE SCH MG: 0.03 SOLUTION RESPIRATORY (INHALATION) at 09:50

## 2020-10-03 RX ADMIN — LORATADINE SCH MG: 10 TABLET ORAL at 09:15

## 2020-10-03 RX ADMIN — OXYCODONE HYDROCHLORIDE SCH MG: 10 TABLET, FILM COATED, EXTENDED RELEASE ORAL at 09:15

## 2020-10-03 RX ADMIN — POTASSIUM CHLORIDE SCH: 14.9 INJECTION, SOLUTION INTRAVENOUS at 07:03

## 2020-10-03 RX ADMIN — LOSARTAN POTASSIUM AND HYDROCHLOROTHIAZIDE SCH EACH: 12.5; 5 TABLET ORAL at 09:15

## 2020-10-03 RX ADMIN — POTASSIUM CHLORIDE SCH: 14.9 INJECTION, SOLUTION INTRAVENOUS at 00:06

## 2020-10-03 RX ADMIN — HYDROCODONE BITARTRATE AND ACETAMINOPHEN PRN EACH: 10; 325 TABLET ORAL at 02:32

## 2020-10-03 RX ADMIN — SERTRALINE HYDROCHLORIDE SCH MG: 100 TABLET ORAL at 09:16

## 2020-10-03 RX ADMIN — POTASSIUM CHLORIDE SCH: 14.9 INJECTION, SOLUTION INTRAVENOUS at 09:17

## 2020-10-03 RX ADMIN — HYDROCODONE BITARTRATE AND ACETAMINOPHEN PRN EACH: 10; 325 TABLET ORAL at 13:35

## 2020-10-03 NOTE — PN
PROGRESS NOTE



DATE OF SERVICE:

10/03/2020



This 50-year-old woman who was admitted after right total knee arthroplasty is

improving significantly.  No chest pain.  No palpitations.  No fever.  Orthopedic

surgery planning discharge.



PHYSICAL EXAMINATION:

Alert and oriented times three.  Pulse 58, blood pressure 152/89, respiration 16.

Temperature 98.2, pulse ox 98% on room air.

HEENT:  Conjunctivae normal.

NECK: No JVD.

CARDIOVASCULAR: S1, S2 muffled.

RESPIRATIONS: Breath sounds diminished in the bases.  No rhonchi.  No crackles.

ABDOMEN:  Soft, nontender.

LEGS: Status post surgery.



LABS:

WBC 5.2, hemoglobin is 8.9.



ASSESSMENT:

1. Status post right total knee arthroplasty.

2. Mild hyponatremia as well as mild anemia, possibly iron deficiency, present on

    admission.

3. History of asthma.

4. Fibromyalgia.

5. Gastroesophageal reflux disease.

6. History of gastrointestinal bleed.

7. Hypertension.

8. History of degenerative joint disease.

9. History of hypothyroidism.

10.History of chronic back pain.

11.Appendectomy.

12.History of bariatric surgery.

13.History of cholecystectomy.

14.History of partial thyroidectomy.



RECOMMENDATIONS AND DISCUSSION:

Recommend to continue current medications, management and symptomatic treatment.

Resume the home medications.  Closely follow with primary physician.  Repeat labs in

the outpatient setting.  Also recommended continue with iron tablets at home.  Further

recommendations to follow.





MMODL / IJN: [id number] / Job#: [id number]

## 2020-10-03 NOTE — P.DS
Providers


Date of admission: 


10/01/20 23:20





Expected date of discharge: 10/03/20


Attending physician: 


Howard Phipps





Consults: 





                                        





10/01/20 08:45


Consult Physician Routine 


   Consulting Provider: Estelle Calhoun


   Consult Reason/Comments: post op medical management


   Do you want consulting provider notified?: Yes











Primary care physician: 


Stated None





Hospital Course: 


This is a 50-year-old female who was last seen with complaint of continued right

knee pain.  The patient has a known history of degenerative arthritis of the 

right knee and presents to discuss surgical options.  After discussion and 

consideration the patient elects to proceed with total right knee arthroplasty. 

The patient is seen preoperatively by Dr. Díaz and cleared for surgery.





The patient is admitted to [facility] for total right knee 

arthroplasty.  The procedures performed without complication or sequelae.  

Patient is doing well postoperatively.  Vital signs are stable at discharge.  L

abs are stable at discharge.


Patient is examined bedside this morning. She states she her pain is much more 

controlled today compared to yesterday.  She has been up with physical therapy 

this morning.  She states she is ambulating with a walker with minimal 

assistance.  She would like to return home today.  She states she feels 

comfortable returning home.  She is voiding freely.  She denies chest pain, 

shortness of breath, nausea, vomiting, fevers, chills.  She denies numbness or 

tingling of the right lower extremity.  She denies any complaints or concerns 

today.


On examination, the patient is sitting up in bed in no apparent distress.  She 

is alert and oriented 3.  On inspection of the right knee, there is a clean, 

dry, intact dressing in place to the anterior knee.  There is no bleeding or 

drainage through the dressing.  Right lower extremity is warm and well-perfused 

with brisk capillary refill distally.  Dorsalis pedis pulse +2.  Motor and 

sensory function are intact of the right lower extremity.  The calves are soft 

and nontender to palpation bilaterally.








The patient is discharged to home on postop day #2, pending medical clearance.  

Please see orders and refer to the med rec for accurate list of medications.





Patient Condition at Discharge: Fair





Plan - Discharge Summary


Discharge Rx Participant: Yes


New Discharge Prescriptions: 


New


   Docusate [Colace] 100 mg PO BID #60 capsule


   HYDROcodone/APAP 10-325MG [Norco ] 1 tab PO Q4HR PRN #42 tab


     PRN Reason: Pain


   oxyCODONE ER [OxyCONTIN] 10 mg PO Q12HR 5 Days #10 tab


   Aspirin [Adult Low Dose Aspirin EC] 81 mg PO BID 30 Days #60 tablet.





No Action


   Levothyroxine Sodium [Synthroid] 150 mcg PO DAILY


   Cetirizine HCl [Zyrtec] 10 mg PO DAILY


   Sertraline HCl [Zoloft] 150 mg PO DAILY


   Fluticasone/Salmeterol [Advair 100-50 Diskus] 1 inhalation PO RT-Q12H


   Albuterol Inhaler (Mhu) [Ventolin Hfa Inhaler (Mhu)] 1 - 2 puff INHALATION 

RT-Q6H PRN


     PRN Reason: Shortness Of Breath


   Omeprazole 40 mg PO DAILY #30 cap


   Losartan-Hctz 50-12.5 mg [Hyzaar 50-12.5] 1 tab PO DAILY #0


   traZODone HCL [Desyrel] 100 mg PO HS


Discharge Medication List





Levothyroxine Sodium [Synthroid] 150 mcg PO DAILY 02/27/15 [History]


Cetirizine HCl [Zyrtec] 10 mg PO DAILY 05/22/18 [History]


Albuterol Inhaler (Mhu) [Ventolin Hfa Inhaler (Mhu)] 1 - 2 puff INHALATION RT-

Q6H PRN 06/08/19 [History]


Fluticasone/Salmeterol [Advair 100-50 Diskus] 1 inhalation PO RT-Q12H 06/08/19 

[History]


Sertraline HCl [Zoloft] 150 mg PO DAILY 06/08/19 [History]


Omeprazole 40 mg PO DAILY #30 cap 06/10/19 [Rx]


Losartan-Hctz 50-12.5 mg [Hyzaar 50-12.5] 1 tab PO DAILY #0 07/19/20 [Rx]


traZODone HCL [Desyrel] 100 mg PO HS 07/21/20 [History]


Docusate [Colace] 100 mg PO BID #60 capsule 10/02/20 [Rx]


HYDROcodone/APAP 10-325MG [Norco ] 1 tab PO Q4HR PRN #42 tab 10/02/20 [Rx]


oxyCODONE ER [OxyCONTIN] 10 mg PO Q12HR 5 Days #10 tab 10/02/20 [Rx]


Aspirin [Adult Low Dose Aspirin EC] 81 mg PO BID 30 Days #60 tablet. 10/03/20 

[Rx]








Follow up Appointment(s)/Referral(s): 


Lena Díaz NPC [Family Provider] - 10/07/20 11:00 am


[facility], [NON-STAFF] - 


Howard Phipps MD [STAFF PHYSICIAN] - 10/12/20 10:45 am


Activity/Diet/Wound Care/Special Instructions: 


Keep wound clean and dry


Take meds as directed


Follow-up with Dr. Phipps in office


Weight bear as tolerated


May shower in 3 days if no bleeding





Discharge Disposition: HOME WITH HOME HEALTH SERVICES

## 2022-10-24 ENCOUNTER — HOSPITAL ENCOUNTER (OUTPATIENT)
Dept: HOSPITAL 47 - BARWHC3 | Age: 52
End: 2022-10-24
Attending: SURGERY
Payer: COMMERCIAL

## 2022-10-24 VITALS
SYSTOLIC BLOOD PRESSURE: 116 MMHG | HEART RATE: 75 BPM | DIASTOLIC BLOOD PRESSURE: 75 MMHG | TEMPERATURE: 98.3 F | RESPIRATION RATE: 16 BRPM

## 2022-10-24 VITALS — BODY MASS INDEX: 35.6 KG/M2

## 2022-10-24 DIAGNOSIS — K90.9: ICD-10-CM

## 2022-10-24 DIAGNOSIS — T56.894A: ICD-10-CM

## 2022-10-24 DIAGNOSIS — E66.01: ICD-10-CM

## 2022-10-24 DIAGNOSIS — E55.9: ICD-10-CM

## 2022-10-24 DIAGNOSIS — D50.8: Primary | ICD-10-CM

## 2022-10-24 LAB
ERYTHROCYTE [DISTWIDTH] IN BLOOD BY AUTOMATED COUNT: 4.35 X 10*6/UL (ref 4.1–5.2)
ERYTHROCYTE [DISTWIDTH] IN BLOOD: 15.9 % (ref 11.5–14.5)
HCT VFR BLD AUTO: 31.8 % (ref 37.2–46.3)
HGB BLD-MCNC: 10 G/DL (ref 12–15)
MCH RBC QN AUTO: 23 PG (ref 27–32)
MCHC RBC AUTO-ENTMCNC: 31.4 G/DL (ref 32–37)
MCV RBC AUTO: 73.1 FL (ref 80–97)
NRBC BLD AUTO-RTO: 0 /100 WBCS (ref 0–0)
PLATELET # BLD AUTO: 321 X 10*3/UL (ref 140–440)
WBC # BLD AUTO: 6.86 X 10*3/UL (ref 4.5–10)

## 2022-10-24 PROCEDURE — 84443 ASSAY THYROID STIM HORMONE: CPT

## 2022-10-24 PROCEDURE — 85027 COMPLETE CBC AUTOMATED: CPT

## 2022-10-24 PROCEDURE — 82746 ASSAY OF FOLIC ACID SERUM: CPT

## 2022-10-24 PROCEDURE — 83540 ASSAY OF IRON: CPT

## 2022-10-24 PROCEDURE — 82306 VITAMIN D 25 HYDROXY: CPT

## 2022-10-24 PROCEDURE — 83735 ASSAY OF MAGNESIUM: CPT

## 2022-10-24 PROCEDURE — 82728 ASSAY OF FERRITIN: CPT

## 2022-10-24 PROCEDURE — 84630 ASSAY OF ZINC: CPT

## 2022-10-24 PROCEDURE — 83550 IRON BINDING TEST: CPT

## 2022-10-24 PROCEDURE — 80053 COMPREHEN METABOLIC PANEL: CPT

## 2022-10-24 PROCEDURE — 84590 ASSAY OF VITAMIN A: CPT

## 2022-10-24 PROCEDURE — 84255 ASSAY OF SELENIUM: CPT

## 2022-10-24 PROCEDURE — 84425 ASSAY OF VITAMIN B-1: CPT

## 2022-10-24 PROCEDURE — 99211 OFF/OP EST MAY X REQ PHY/QHP: CPT

## 2022-10-24 PROCEDURE — 82607 VITAMIN B-12: CPT

## 2022-10-25 LAB
ALBUMIN SERPL-MCNC: 4.3 G/DL (ref 3.8–4.9)
ALBUMIN/GLOB SERPL: 1.7 G/DL (ref 1.6–3.17)
ALP SERPL-CCNC: 73 U/L (ref 41–126)
ALT SERPL-CCNC: 14 U/L (ref 8–44)
ANION GAP SERPL CALC-SCNC: 12.8 MMOL/L (ref 10–18)
AST SERPL-CCNC: 22 U/L (ref 13–35)
BUN SERPL-SCNC: 15.3 MG/DL (ref 9–27)
BUN/CREAT SERPL: 20.76 RATIO (ref 12–20)
CALCIUM SPEC-MCNC: 8.9 MG/DL (ref 8.7–10.3)
CHLORIDE SERPL-SCNC: 103 MMOL/L (ref 96–109)
CO2 SERPL-SCNC: 21.6 MMOL/L (ref 20–27.5)
FERRITIN SERPL-MCNC: 10 NG/ML (ref 10–291)
GLOBULIN SER CALC-MCNC: 2.5 G/DL (ref 1.6–3.3)
GLUCOSE SERPL-MCNC: 84 MG/DL (ref 70–110)
IRON SERPL-MCNC: 19 UG/DL (ref 50–170)
MAGNESIUM SPEC-SCNC: 2.2 MG/DL (ref 1.5–2.4)
POTASSIUM SERPL-SCNC: 4.1 MMOL/L (ref 3.5–5.5)
PROT SERPL-MCNC: 6.8 G/DL (ref 6.2–8.2)
SODIUM SERPL-SCNC: 137 MMOL/L (ref 135–145)
TIBC SERPL-MCNC: 521 UG/DL (ref 228–460)
VIT B12 SERPL-MCNC: 586 PG/ML (ref 200–944)
ZINC SERPL-MCNC: 67 UG/DL (ref 60–130)

## 2023-03-13 ENCOUNTER — HOSPITAL ENCOUNTER (OUTPATIENT)
Dept: HOSPITAL 47 - ORWHC2ENDO | Age: 53
Discharge: HOME | End: 2023-03-13
Attending: SURGERY
Payer: COMMERCIAL

## 2023-03-13 VITALS — DIASTOLIC BLOOD PRESSURE: 83 MMHG | SYSTOLIC BLOOD PRESSURE: 134 MMHG

## 2023-03-13 VITALS — RESPIRATION RATE: 16 BRPM | HEART RATE: 76 BPM

## 2023-03-13 VITALS — TEMPERATURE: 98.4 F

## 2023-03-13 VITALS — BODY MASS INDEX: 35.4 KG/M2

## 2023-03-13 DIAGNOSIS — Z79.51: ICD-10-CM

## 2023-03-13 DIAGNOSIS — Z90.49: ICD-10-CM

## 2023-03-13 DIAGNOSIS — F41.9: ICD-10-CM

## 2023-03-13 DIAGNOSIS — Z88.0: ICD-10-CM

## 2023-03-13 DIAGNOSIS — I10: ICD-10-CM

## 2023-03-13 DIAGNOSIS — Z82.3: ICD-10-CM

## 2023-03-13 DIAGNOSIS — M79.7: ICD-10-CM

## 2023-03-13 DIAGNOSIS — K29.50: ICD-10-CM

## 2023-03-13 DIAGNOSIS — Z80.3: ICD-10-CM

## 2023-03-13 DIAGNOSIS — J45.909: ICD-10-CM

## 2023-03-13 DIAGNOSIS — Z98.84: ICD-10-CM

## 2023-03-13 DIAGNOSIS — F32.A: ICD-10-CM

## 2023-03-13 DIAGNOSIS — Z87.11: ICD-10-CM

## 2023-03-13 DIAGNOSIS — F12.90: ICD-10-CM

## 2023-03-13 DIAGNOSIS — K21.00: ICD-10-CM

## 2023-03-13 DIAGNOSIS — E07.9: ICD-10-CM

## 2023-03-13 DIAGNOSIS — Z90.710: ICD-10-CM

## 2023-03-13 DIAGNOSIS — Z79.899: ICD-10-CM

## 2023-03-13 DIAGNOSIS — Z86.59: ICD-10-CM

## 2023-03-13 DIAGNOSIS — Z79.890: ICD-10-CM

## 2023-03-13 DIAGNOSIS — M19.90: ICD-10-CM

## 2023-03-13 DIAGNOSIS — Z12.11: Primary | ICD-10-CM

## 2023-03-13 DIAGNOSIS — Z98.51: ICD-10-CM

## 2023-03-13 PROCEDURE — 88305 TISSUE EXAM BY PATHOLOGIST: CPT

## 2023-03-13 PROCEDURE — 43239 EGD BIOPSY SINGLE/MULTIPLE: CPT

## 2023-03-13 NOTE — P.GSHP
History of Present Illness


H&P Date: 23


Chief Complaint: GERD, and colonoscopy





Asst. 52-year-old female who's had complaints of GERD.  Patient states she is 

appears history of peptic ulcer disease.  He also presents for screening 

colonoscopy.





Past Medical History


Past Medical History: Asthma, Chest Pain / Angina, Fibromyalgia, GERD/Reflux, GI

Bleed, Hypertension, Liver Disease, Musculoskeletal Disorder, Osteoarthritis 

(OA), Thyroid Disorder


Additional Past Medical History / Comment(s): Degenerative Disc Disease w/ 

chronic back pain. Hx of fatty liver. Torn Lt Rotator cuff 2016. Hx fall 2019,

with fractured nose and brain bleed. Hx GI bleed after taking Motrin. "Cardiac 

incident in 2016, that's all they told me."


History of Any Multi-Drug Resistant Organisms: None Reported


Past Surgical History: Appendectomy, Bariatric Surgery, Bladder Surgery, 

Cholecystectomy, Heart Catheterization, Hysterectomy, Joint Replacement, 

Orthopedic Surgery, Tubal Ligation


Additional Past Surgical History / Comment(s): Partial thyroidectomy, bladder 

suspension w/mesh (has since undergone sx 4X for excision of mesh), sinus repair

surgery, bilateral knee surgery (meniscus, femur fracture on right), 10-31-16 

Garrett-en-Y Gastric Bypass, EGD, colonoscopy, panniculectomy, total left knee 

replacement, surgery for shattered right scapula - has 2 plates and 9 screws.


Past Anesthesia/Blood Transfusion Reactions: Family History of Problems w/ 

Anesthesia, Motion Sickness


Additional Past Anesthesia/Blood Transfusion Reaction / Comment(s): Difficulty 

waking up, hx difficult intubation, "has gotten better since weight loss.".  

Grandmother has difficulty waking and also difficult intubation.


Past Psychological History: Anxiety, Depression


Smoking Status: Never smoker


Past Alcohol Use History: Rare


Past Drug Use History: Marijuana


Additional Drug Use History / Comment(s): CBD gummy occasionally for anxiety. 

Aware no use 24 hrs prior to procedure.





- Past Family History


  ** Mother


Family Medical History: Cancer


Additional Family Medical History / Comment(s): Breast cancer.





  ** Father


Family Medical History: Cancer, CVA/TIA


Additional Family Medical History / Comment(s): - stroke.





Medications and Allergies


                                Home Medications











 Medication  Instructions  Recorded  Confirmed  Type


 


Levothyroxine Sodium [Synthroid] 150 mcg PO QAM 02/27/15 03/13/23 History


 


Cetirizine HCl [Zyrtec] 10 mg PO DAILY 18 History


 


Albuterol Inhaler [Ventolin Hfa 1 - 2 puff INHALATION Q6H PRN 19 

History





Inhaler]    


 


Fluticasone Propion/Salmeterol 1 inhalation PO Q12H 19 History





[Advair 100-50 Diskus]    


 


Sertraline HCl [Zoloft] 150 mg PO QAM 19 History


 


Omeprazole 40 mg PO DAILY #30 cap 06/10/19 03/13/23 Rx


 


Losartan-Hctz 50-12.5 mg [Hyzaar 1 tab PO DAILY #0 20 Rx





50-12.5]    


 


traZODone HCL [Desyrel] 100 mg PO HS 20 History


 


busPIRone HCL [Buspar] 30 mg PO BID 10/24/22 03/13/23 History


 


Gabapentin 900 mg PO TID PRN 23 History


 


Oxybutynin Chloride [Ditropan XL] 10 mg PO DAILY 23 History








                                    Allergies











Allergy/AdvReac Type Severity Reaction Status Date / Time


 


NSAIDS (Non-Steroidal Allergy  Cannot Verified 23 12:38





Anti-Inflamma   take due  





   to Gastric  





   Bypass  


 


Penicillins Allergy  Unknown Verified 23 12:38





   Childhood  














Surgical - Exam


                                   Vital Signs











Temp Pulse Resp BP Pulse Ox


 


 98.4 F   74   18   144/64   95 


 


 23 12:49  23 12:49  23 12:49  23 12:49  23 12:49














- General


well developed, well nourished, no distress





- Eyes


PERRL





- ENT


normal pinna





- Neck


no masses





- Respiratory


normal expansion





- Cardiovascular


Rhythm: regular





- Abdomen


Abdomen: soft, non tender





Assessment and Plan


Assessment: 


GERD.  We'll perform EGD.





We'll perform screening colonoscopy.

## 2023-03-13 NOTE — P.OP
Date of Procedure: 03/13/23


Preoperative Diagnosis: 


GERD, screening colonoscopy


Postoperative Diagnosis: 


Antral gastritis





Normal colon


Procedure(s) Performed: 


EGD





Colonoscopy


Anesthesia: CROW


Surgeon: Agusto Kaminski


Pathology: other (Esophagus)


Condition: stable


Disposition: PACU


Description of Procedure: 


The patient's placed on the endoscopy table in the lateral position.  She 

received IV sedation.  The gastro-/oropharynx passed in the esophagus and 

stomach.  A shunt a previous gastric bypass.  The gastrojejunostomy was visu

alized.  The gastric pouch.  A slightly enlarged.  There is no evidence of any 

inflammatory change the gastric pouch.  There was no evidence of any 

inflammatory changes of gastrojejunostomy.  GE junction was at 40 cm the distal 

esophagus mildly inflamed.  Biopsies performed.  The proximal esophagus appeared

normal.  Scope withdrawn for patient.





Next digital rectal exam was performed.  This revealed no ebonized.  Flexible 

colonoscope was then placed patient anus and passed throughout the colon.  The 

scope could not be placed in the cecum sigmoid tortuosity valve.  Scope was then

withdrawn.  The visualized right colon, transverse colon and descending colon 

appeared normal.  The sigmoid colon and rectum appeared normal.  Scope withdrawn

for patient.

## 2024-11-08 ENCOUNTER — HOSPITAL ENCOUNTER (OUTPATIENT)
Dept: HOSPITAL 47 - LABPAT | Age: 54
Discharge: HOME | End: 2024-11-08
Attending: NURSE PRACTITIONER
Payer: COMMERCIAL

## 2024-11-08 ENCOUNTER — HOSPITAL ENCOUNTER (OUTPATIENT)
Dept: HOSPITAL 47 - LABPAT | Age: 54
Discharge: HOME | End: 2024-11-08
Attending: ORTHOPAEDIC SURGERY
Payer: COMMERCIAL

## 2024-11-08 DIAGNOSIS — Z22.322: ICD-10-CM

## 2024-11-08 DIAGNOSIS — Z01.818: Primary | ICD-10-CM

## 2024-11-08 DIAGNOSIS — M17.32: ICD-10-CM

## 2024-11-08 DIAGNOSIS — G89.29: ICD-10-CM

## 2024-11-08 DIAGNOSIS — R79.1: ICD-10-CM

## 2024-11-08 LAB
ALBUMIN SERPL-MCNC: 4.7 G/DL (ref 3.8–4.9)
ALBUMIN/GLOB SERPL: 1.52 RATIO (ref 1.6–3.17)
ALP SERPL-CCNC: 64 U/L (ref 41–126)
ALT SERPL-CCNC: 16 U/L (ref 8–44)
ANION GAP SERPL CALC-SCNC: 13.5 MMOL/L (ref 4–12)
APTT BLD: 24.1 SEC (ref 22–30)
AST SERPL-CCNC: 26 U/L (ref 13–35)
BASOPHILS # BLD AUTO: 0.07 X 10*3/UL (ref 0–0.1)
BASOPHILS NFR BLD AUTO: 0.6 %
BUN SERPL-SCNC: 23.2 MG/DL (ref 9–27)
BUN/CREAT SERPL: 29 RATIO (ref 12–20)
CALCIUM SPEC-MCNC: 10.2 MG/DL (ref 8.7–10.3)
CHLORIDE SERPL-SCNC: 102 MMOL/L (ref 96–109)
CO2 SERPL-SCNC: 25.5 MMOL/L (ref 21.6–31.8)
EOSINOPHIL # BLD AUTO: 0.06 X 10*3/UL (ref 0.04–0.35)
EOSINOPHIL NFR BLD AUTO: 0.5 %
ERYTHROCYTE [DISTWIDTH] IN BLOOD BY AUTOMATED COUNT: 4.9 X 10*6/UL (ref 4.1–5.2)
ERYTHROCYTE [DISTWIDTH] IN BLOOD: 15.7 % (ref 11.5–14.5)
GLOBULIN SER CALC-MCNC: 3.1 G/DL (ref 1.6–3.3)
GLUCOSE SERPL-MCNC: 105 MG/DL (ref 70–110)
HCT VFR BLD AUTO: 43.4 % (ref 37.2–46.3)
HGB BLD-MCNC: 14 G/DL (ref 12–15)
IMM GRANULOCYTES BLD QL AUTO: 0.3 %
INR PPP: 1 (ref ?–1.2)
LYMPHOCYTES # SPEC AUTO: 1.21 X 10*3/UL (ref 0.9–5)
LYMPHOCYTES NFR SPEC AUTO: 10.8 %
MCH RBC QN AUTO: 28.6 PG (ref 27–32)
MCHC RBC AUTO-ENTMCNC: 32.3 G/DL (ref 32–37)
MCV RBC AUTO: 88.6 FL (ref 80–97)
MONOCYTES # BLD AUTO: 0.63 X 10*3/UL (ref 0.2–1)
MONOCYTES NFR BLD AUTO: 5.6 %
NEUTROPHILS # BLD AUTO: 9.24 X 10*3/UL (ref 1.8–7.7)
NEUTROPHILS NFR BLD AUTO: 82.2 %
NRBC BLD AUTO-RTO: 0 X 10*3/UL (ref 0–0.01)
PLATELET # BLD AUTO: 401 X 10*3/UL (ref 140–440)
POTASSIUM SERPL-SCNC: 4.7 MMOL/L (ref 3.5–5.5)
PROT SERPL-MCNC: 7.8 G/DL (ref 6.2–8.2)
PT BLD: 10.6 SEC (ref 10–12.5)
SODIUM SERPL-SCNC: 141 MMOL/L (ref 135–145)
WBC # BLD AUTO: 11.24 X 10*3/UL (ref 4.5–10)

## 2024-11-08 PROCEDURE — 85610 PROTHROMBIN TIME: CPT

## 2024-11-08 PROCEDURE — 85652 RBC SED RATE AUTOMATED: CPT

## 2024-11-08 PROCEDURE — 71046 X-RAY EXAM CHEST 2 VIEWS: CPT

## 2024-11-08 PROCEDURE — 85730 THROMBOPLASTIN TIME PARTIAL: CPT

## 2024-11-08 PROCEDURE — 86140 C-REACTIVE PROTEIN: CPT

## 2024-11-08 PROCEDURE — 87070 CULTURE OTHR SPECIMN AEROBIC: CPT

## 2024-11-08 PROCEDURE — 85025 COMPLETE CBC W/AUTO DIFF WBC: CPT

## 2024-11-08 PROCEDURE — 93005 ELECTROCARDIOGRAM TRACING: CPT

## 2024-11-08 PROCEDURE — 80053 COMPREHEN METABOLIC PANEL: CPT

## 2024-11-19 ENCOUNTER — HOSPITAL ENCOUNTER (INPATIENT)
Dept: HOSPITAL 47 - 2ORMAIN | Age: 54
LOS: 3 days | Discharge: HOME HEALTH | DRG: 467 | End: 2024-11-22
Attending: ORTHOPAEDIC SURGERY | Admitting: ORTHOPAEDIC SURGERY
Payer: MEDICARE

## 2024-11-19 DIAGNOSIS — F32.A: ICD-10-CM

## 2024-11-19 DIAGNOSIS — E89.0: ICD-10-CM

## 2024-11-19 DIAGNOSIS — E78.5: ICD-10-CM

## 2024-11-19 DIAGNOSIS — E87.1: ICD-10-CM

## 2024-11-19 DIAGNOSIS — T84.023A: Primary | ICD-10-CM

## 2024-11-19 DIAGNOSIS — I48.91: ICD-10-CM

## 2024-11-19 DIAGNOSIS — S01.111A: ICD-10-CM

## 2024-11-19 DIAGNOSIS — Z79.890: ICD-10-CM

## 2024-11-19 DIAGNOSIS — Z91.81: ICD-10-CM

## 2024-11-19 DIAGNOSIS — Z79.01: ICD-10-CM

## 2024-11-19 DIAGNOSIS — G89.18: ICD-10-CM

## 2024-11-19 DIAGNOSIS — F41.9: ICD-10-CM

## 2024-11-19 DIAGNOSIS — Z87.820: ICD-10-CM

## 2024-11-19 DIAGNOSIS — D62: ICD-10-CM
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
## 2024-11-19 DIAGNOSIS — Z98.84: ICD-10-CM

## 2024-11-19 DIAGNOSIS — G89.29: ICD-10-CM

## 2024-11-19 DIAGNOSIS — Z79.899: ICD-10-CM
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
## 2024-11-19 DIAGNOSIS — M79.7: ICD-10-CM

## 2024-11-19 DIAGNOSIS — K21.9: ICD-10-CM

## 2024-11-19 DIAGNOSIS — J44.89: ICD-10-CM

## 2024-11-19 DIAGNOSIS — I10: ICD-10-CM

## 2024-11-19 DIAGNOSIS — S02.2XXA: ICD-10-CM

## 2024-11-19 PROCEDURE — 0SRD0JA REPLACEMENT OF LEFT KNEE JOINT WITH SYNTHETIC SUBSTITUTE, UNCEMENTED, OPEN APPROACH: ICD-10-PCS

## 2024-11-19 PROCEDURE — 3E0T3BZ INTRODUCTION OF ANESTHETIC AGENT INTO PERIPHERAL NERVES AND PLEXI, PERCUTANEOUS APPROACH: ICD-10-PCS

## 2024-11-19 PROCEDURE — 94760 N-INVAS EAR/PLS OXIMETRY 1: CPT

## 2024-11-19 PROCEDURE — 70450 CT HEAD/BRAIN W/O DYE: CPT

## 2024-11-19 PROCEDURE — 80048 BASIC METABOLIC PNL TOTAL CA: CPT

## 2024-11-19 PROCEDURE — 64999 UNLISTED PX NERVOUS SYSTEM: CPT

## 2024-11-19 PROCEDURE — 85025 COMPLETE CBC W/AUTO DIFF WBC: CPT

## 2024-11-19 PROCEDURE — 70486 CT MAXILLOFACIAL W/O DYE: CPT

## 2024-11-19 PROCEDURE — 0SPD0JZ REMOVAL OF SYNTHETIC SUBSTITUTE FROM LEFT KNEE JOINT, OPEN APPROACH: ICD-10-PCS

## 2024-11-19 PROCEDURE — 85027 COMPLETE CBC AUTOMATED: CPT

## 2024-11-19 PROCEDURE — 64448 NJX AA&/STRD FEM NRV NFS IMG: CPT

## 2024-11-19 PROCEDURE — 83735 ASSAY OF MAGNESIUM: CPT

## 2024-11-19 RX ADMIN — MIDAZOLAM ONE MG: 1 INJECTION INTRAMUSCULAR; INTRAVENOUS at 13:16

## 2024-11-19 RX ADMIN — CEFAZOLIN ONE MLS: 330 INJECTION, POWDER, FOR SOLUTION INTRAMUSCULAR; INTRAVENOUS at 14:14

## 2024-11-19 RX ADMIN — DOCUSATE SODIUM AND SENNOSIDES SCH: 50; 8.6 TABLET ORAL at 21:27

## 2024-11-19 RX ADMIN — ACETAMINOPHEN PRN MG: 500 TABLET ORAL at 12:38

## 2024-11-19 RX ADMIN — ONDANSETRON ONE MG: 2 INJECTION INTRAMUSCULAR; INTRAVENOUS at 12:55

## 2024-11-19 RX ADMIN — HYDROCODONE BITARTRATE AND ACETAMINOPHEN PRN EACH: 7.5; 325 TABLET ORAL at 19:38

## 2024-11-19 RX ADMIN — POTASSIUM CHLORIDE ONE MLS: 14.9 INJECTION, SOLUTION INTRAVENOUS at 13:01

## 2024-11-19 RX ADMIN — GABAPENTIN PRN MG: 300 CAPSULE ORAL at 12:37

## 2024-11-19 RX ADMIN — POTASSIUM CHLORIDE SCH MLS/HR: 14.9 INJECTION, SOLUTION INTRAVENOUS at 12:55

## 2024-11-19 RX ADMIN — HYDROMORPHONE HYDROCHLORIDE PRN MG: 1 INJECTION, SOLUTION INTRAMUSCULAR; INTRAVENOUS; SUBCUTANEOUS at 18:20

## 2024-11-19 RX ADMIN — CEFAZOLIN SCH: 330 INJECTION, POWDER, FOR SOLUTION INTRAMUSCULAR; INTRAVENOUS at 17:49

## 2024-11-19 RX ADMIN — DEXAMETHASONE SODIUM PHOSPHATE ONE MG: 4 INJECTION, SOLUTION INTRAMUSCULAR; INTRAVENOUS at 12:56

## 2024-11-19 RX ADMIN — HYDROMORPHONE HYDROCHLORIDE PRN MG: 1 INJECTION, SOLUTION INTRAMUSCULAR; INTRAVENOUS; SUBCUTANEOUS at 16:49

## 2024-11-20 LAB
ANION GAP SERPL CALC-SCNC: 6 MMOL/L
BASOPHILS # BLD AUTO: 0.02 X 10*3/UL (ref 0–0.1)
BASOPHILS NFR BLD AUTO: 0.2 %
BUN SERPL-SCNC: 8 MG/DL (ref 7–17)
CALCIUM SPEC-MCNC: 8.5 MG/DL (ref 8.4–10.2)
CHLORIDE SERPL-SCNC: 103 MMOL/L (ref 98–107)
CO2 SERPL-SCNC: 23 MMOL/L (ref 22–30)
EOSINOPHIL # BLD AUTO: 0.01 X 10*3/UL (ref 0.04–0.35)
EOSINOPHIL NFR BLD AUTO: 0.1 %
ERYTHROCYTE [DISTWIDTH] IN BLOOD BY AUTOMATED COUNT: 3.6 X 10*6/UL (ref 4.1–5.2)
ERYTHROCYTE [DISTWIDTH] IN BLOOD: 14.4 % (ref 11.5–14.5)
GLUCOSE SERPL-MCNC: 93 MG/DL (ref 74–99)
HCT VFR BLD AUTO: 32.5 % (ref 37.2–46.3)
HGB BLD-MCNC: 10.5 G/DL (ref 12–15)
IMM GRANULOCYTES BLD QL AUTO: 0.4 %
LYMPHOCYTES # SPEC AUTO: 1 X 10*3/UL (ref 0.9–5)
LYMPHOCYTES NFR SPEC AUTO: 12.1 %
MAGNESIUM SPEC-SCNC: 1.9 MG/DL (ref 1.6–2.3)
MCH RBC QN AUTO: 29.2 PG (ref 27–32)
MCHC RBC AUTO-ENTMCNC: 32.3 G/DL (ref 32–37)
MCV RBC AUTO: 90.3 FL (ref 80–97)
MONOCYTES # BLD AUTO: 0.65 X 10*3/UL (ref 0.2–1)
MONOCYTES NFR BLD AUTO: 7.9 %
NEUTROPHILS # BLD AUTO: 6.53 X 10*3/UL (ref 1.8–7.7)
NEUTROPHILS NFR BLD AUTO: 79.3 %
NRBC BLD AUTO-RTO: 0 X 10*3/UL (ref 0–0.01)
PLATELET # BLD AUTO: 184 X 10*3/UL (ref 140–440)
POTASSIUM SERPL-SCNC: 4.2 MMOL/L (ref 3.5–5.1)
SODIUM SERPL-SCNC: 132 MMOL/L (ref 137–145)
WBC # BLD AUTO: 8.24 X 10*3/UL (ref 4.5–10)

## 2024-11-20 RX ADMIN — OXYBUTYNIN CHLORIDE SCH MG: 10 TABLET, EXTENDED RELEASE ORAL at 08:39

## 2024-11-20 RX ADMIN — LEVOTHYROXINE SODIUM SCH MCG: 75 TABLET ORAL at 06:04

## 2024-11-20 RX ADMIN — LOSARTAN POTASSIUM AND HYDROCHLOROTHIAZIDE SCH EACH: 12.5; 5 TABLET ORAL at 08:39

## 2024-11-20 RX ADMIN — ONDANSETRON PRN MG: 2 INJECTION INTRAMUSCULAR; INTRAVENOUS at 08:37

## 2024-11-20 RX ADMIN — ACETAMINOPHEN SCH MLS/HR: 10 INJECTION, SOLUTION INTRAVENOUS at 11:22

## 2024-11-20 RX ADMIN — CYCLOBENZAPRINE HYDROCHLORIDE PRN MG: 10 TABLET, FILM COATED ORAL at 16:26

## 2024-11-20 RX ADMIN — SERTRALINE HYDROCHLORIDE SCH MG: 100 TABLET ORAL at 08:37

## 2024-11-20 RX ADMIN — LORATADINE SCH MG: 10 TABLET ORAL at 08:38

## 2024-11-20 RX ADMIN — APIXABAN SCH MG: 2.5 TABLET, FILM COATED ORAL at 08:38

## 2024-11-21 LAB
ANION GAP SERPL CALC-SCNC: 10.1 MMOL/L (ref 4–12)
BUN SERPL-SCNC: 5.1 MG/DL (ref 9–27)
BUN/CREAT SERPL: 8.5 RATIO (ref 12–20)
CALCIUM SPEC-MCNC: 8.2 MG/DL (ref 8.7–10.3)
CHLORIDE SERPL-SCNC: 105 MMOL/L (ref 96–109)
CO2 SERPL-SCNC: 22.9 MMOL/L (ref 21.6–31.8)
ERYTHROCYTE [DISTWIDTH] IN BLOOD BY AUTOMATED COUNT: 3.25 X 10*6/UL (ref 4.1–5.2)
ERYTHROCYTE [DISTWIDTH] IN BLOOD: 14.6 % (ref 11.5–14.5)
GLUCOSE SERPL-MCNC: 95 MG/DL (ref 70–110)
HCT VFR BLD AUTO: 29.7 % (ref 37.2–46.3)
HGB BLD-MCNC: 9.5 G/DL (ref 12–15)
MAGNESIUM SPEC-SCNC: 1.8 MG/DL (ref 1.5–2.4)
MCH RBC QN AUTO: 29.2 PG (ref 27–32)
MCHC RBC AUTO-ENTMCNC: 32 G/DL (ref 32–37)
MCV RBC AUTO: 91.4 FL (ref 80–97)
NRBC BLD AUTO-RTO: 0 X 10*3/UL (ref 0–0.01)
PLATELET # BLD AUTO: 156 X 10*3/UL (ref 140–440)
POTASSIUM SERPL-SCNC: 3.7 MMOL/L (ref 3.5–5.5)
SODIUM SERPL-SCNC: 138 MMOL/L (ref 135–145)
WBC # BLD AUTO: 5.01 X 10*3/UL (ref 4.5–10)

## 2024-11-21 RX ADMIN — HYDROCODONE BITARTRATE AND ACETAMINOPHEN PRN EACH: 10; 325 TABLET ORAL at 11:58

## 2024-11-21 RX ADMIN — HYDROCODONE BITARTRATE AND ACETAMINOPHEN PRN EACH: 7.5; 325 TABLET ORAL at 20:09

## 2024-11-21 RX ADMIN — HYDROMORPHONE HYDROCHLORIDE PRN MG: 1 INJECTION, SOLUTION INTRAMUSCULAR; INTRAVENOUS; SUBCUTANEOUS at 06:09

## 2024-11-22 VITALS — TEMPERATURE: 98.8 F

## 2024-11-22 VITALS — DIASTOLIC BLOOD PRESSURE: 85 MMHG | HEART RATE: 82 BPM | SYSTOLIC BLOOD PRESSURE: 127 MMHG | RESPIRATION RATE: 15 BRPM

## 2024-11-22 LAB
BASOPHILS # BLD AUTO: 0.02 X 10*3/UL (ref 0–0.1)
BASOPHILS NFR BLD AUTO: 0.4 %
EOSINOPHIL # BLD AUTO: 0.06 X 10*3/UL (ref 0.04–0.35)
EOSINOPHIL NFR BLD AUTO: 1.2 %
ERYTHROCYTE [DISTWIDTH] IN BLOOD BY AUTOMATED COUNT: 3.41 X 10*6/UL (ref 4.1–5.2)
ERYTHROCYTE [DISTWIDTH] IN BLOOD: 14.3 % (ref 11.5–14.5)
HCT VFR BLD AUTO: 29.2 % (ref 37.2–46.3)
HGB BLD-MCNC: 9.4 G/DL (ref 12–15)
IMM GRANULOCYTES BLD QL AUTO: 0.4 %
LYMPHOCYTES # SPEC AUTO: 0.88 X 10*3/UL (ref 0.9–5)
LYMPHOCYTES NFR SPEC AUTO: 17.5 %
MCH RBC QN AUTO: 27.6 PG (ref 27–32)
MCHC RBC AUTO-ENTMCNC: 32.2 G/DL (ref 32–37)
MCV RBC AUTO: 85.6 FL (ref 80–97)
MONOCYTES # BLD AUTO: 0.42 X 10*3/UL (ref 0.2–1)
MONOCYTES NFR BLD AUTO: 8.3 %
NEUTROPHILS # BLD AUTO: 3.63 X 10*3/UL (ref 1.8–7.7)
NEUTROPHILS NFR BLD AUTO: 72.2 %
NRBC BLD AUTO-RTO: 0 X 10*3/UL (ref 0–0.01)
PLATELET # BLD AUTO: 194 X 10*3/UL (ref 140–440)
WBC # BLD AUTO: 5.03 X 10*3/UL (ref 4.5–10)

## 2024-11-22 RX ADMIN — HYDROCODONE BITARTRATE AND ACETAMINOPHEN PRN EACH: 7.5; 325 TABLET ORAL at 14:39
